# Patient Record
Sex: MALE | Race: WHITE | NOT HISPANIC OR LATINO | Employment: OTHER | URBAN - METROPOLITAN AREA
[De-identification: names, ages, dates, MRNs, and addresses within clinical notes are randomized per-mention and may not be internally consistent; named-entity substitution may affect disease eponyms.]

---

## 2017-01-09 ENCOUNTER — OFFICE VISIT (OUTPATIENT)
Dept: INTERNAL MEDICINE | Facility: CLINIC | Age: 72
End: 2017-01-09

## 2017-01-09 VITALS
BODY MASS INDEX: 27.44 KG/M2 | RESPIRATION RATE: 12 BRPM | WEIGHT: 196 LBS | SYSTOLIC BLOOD PRESSURE: 148 MMHG | HEART RATE: 48 BPM | TEMPERATURE: 97.1 F | HEIGHT: 71 IN | DIASTOLIC BLOOD PRESSURE: 80 MMHG

## 2017-01-09 DIAGNOSIS — N30.10 INTERSTITIAL CYSTITIS (CHRONIC) WITHOUT HEMATURIA: ICD-10-CM

## 2017-01-09 DIAGNOSIS — I25.10 CHRONIC CORONARY ARTERY DISEASE: Primary | ICD-10-CM

## 2017-01-09 DIAGNOSIS — E78.2 MIXED HYPERLIPIDEMIA: ICD-10-CM

## 2017-01-09 DIAGNOSIS — N40.0 PROSTATE ENLARGEMENT: ICD-10-CM

## 2017-01-09 DIAGNOSIS — I10 ESSENTIAL HYPERTENSION: ICD-10-CM

## 2017-01-09 PROCEDURE — 99214 OFFICE O/P EST MOD 30 MIN: CPT | Performed by: FAMILY MEDICINE

## 2017-01-09 NOTE — MR AVS SNAPSHOT
Timi MUNOZ Luciano   1/9/2017 9:45 AM   Office Visit    Dept Phone:  [phone]   Encounter #:  [id number]    Provider:  Stanley Ruzi Jr., MD   Department:  [facility] INTERNAL MEDICINE                Your Full Care Plan              Today's Medication Changes          These changes are accurate as of: 1/9/17 10:40 AM.  If you have any questions, ask your nurse or doctor.               Stop taking medication(s)listed here:     pentosan polysulfate 100 MG capsule   Commonly known as:  ELMIRON                      Your Updated Medication List          This list is accurate as of: 1/9/17 10:40 AM.  Always use your most recent med list.                amLODIPine-benazepril 5-20 MG per capsule   Commonly known as:  LOTREL 5-20   Take 1 capsule by mouth daily.       aspirin 81 MG tablet       Coenzyme Q10 150 MG capsule       hydrochlorothiazide 12.5 MG capsule   Commonly known as:  MICROZIDE   TAKE 1 CAPSULE DAILY       hydrocortisone 2.5 % rectal cream   Commonly known as:  ANUSOL-HC       Melatonin 3 MG capsule       metoprolol succinate XL 50 MG 24 hr tablet   Commonly known as:  TOPROL-XL   Take 1 tablet by mouth daily.       pravastatin 80 MG tablet   Commonly known as:  PRAVACHOL   Take 1 tablet by mouth every night.       tamsulosin 0.4 MG capsule 24 hr capsule   Commonly known as:  FLOMAX   Take 1 capsule by mouth daily.               You Were Diagnosed With        Codes Comments    Chronic coronary artery disease    -  Primary ICD-10-CM: I25.10  ICD-9-CM: 414.00     Essential hypertension     ICD-10-CM: I10  ICD-9-CM: 401.9     Mixed hyperlipidemia     ICD-10-CM: E78.2  ICD-9-CM: 272.2     Interstitial cystitis (chronic) without hematuria     ICD-10-CM: N30.10  ICD-9-CM: 595.1     Prostate enlargement     ICD-10-CM: N40.0  ICD-9-CM: 600.00       Instructions     None    Patient Instructions History      Upcoming Appointments     Visit Type Date Time Department    OFFICE  "VISIT 1/9/2017  9:45 AM [facility] 4003    OFFICE VISIT 7/10/2017 10:30 AM [facility] 4003      MyChart Signup     Our records indicate that you have declined [facility]. If you would like to sign up for [facility], please email [email or web address] or call [phone] to obtain an activation code.             Other Info from Your Visit           Your Appointments     Jul 10, 2017 10:30 AM EDT   Office Visit with Stanley Ruiz Jr., MD   [facility] INTERNAL MEDICINE (--)    [address]  [facility] [address] [phone]           Arrive 15 minutes prior to appointment.              Allergies     No Known Allergies      Reason for Visit     Coronary Artery Disease     Hypertension     Hyperlipidemia           Vital Signs     Blood Pressure Pulse Temperature Respirations Height Weight    148/80 (BP Location: Left arm, Patient Position: Sitting, Cuff Size: Adult) 48 97.1 °F (36.2 °C) (Tympanic) 12 71\" (180.3 cm) 196 lb (88.9 kg)    Body Mass Index Smoking Status                27.34 kg/m2 Former Smoker          Problems and Diagnoses Noted     Heart disease due to blocked artery    High cholesterol or triglycerides    High blood pressure    Interstitial cystitis    Enlarged prostate        "

## 2017-01-09 NOTE — PROGRESS NOTES
Subjective   Timi Wolf is a 71 y.o. male.     Chief Complaint   Patient presents with   • Coronary Artery Disease   • Hypertension   • Hyperlipidemia         History of Present Illness   Patient is a retired  previously followed by Dr. Williamson.  Problems followed and managed include history of coronary artery disease without history of procedure plus hypertension plus hyperlipidemia plus history of BPH and her social cystitis.  Medications and labs from last year reviewed.  He is relatively asymptomatic at this time.  Chronic maintenance health also reviewed.  He stays active but does have to help take care of his wife who has chronic pain in the back and legs.      The following portions of the patient's history were reviewed and updated as appropriate: allergies, current medications, past social history and problem list.    Review of Systems   Constitutional: Negative.    HENT: Negative.    Eyes: Negative.    Respiratory: Negative.    Cardiovascular: Negative.    Gastrointestinal: Negative.    Endocrine: Negative.    Genitourinary: Negative.    Musculoskeletal: Negative.    Skin: Negative.    Allergic/Immunologic: Negative.    Neurological: Negative.    Hematological: Negative.    Psychiatric/Behavioral: Negative.        Objective   Vitals:    01/09/17 0948   BP: 148/80   Pulse: (!) 48   Resp: 12   Temp: 97.1 °F (36.2 °C)     Physical Exam   Constitutional: He is oriented to person, place, and time. He appears well-developed and well-nourished.   HENT:   Head: Normocephalic and atraumatic.   Right Ear: Tympanic membrane and external ear normal.   Left Ear: Tympanic membrane and external ear normal.   Nose: Nose normal.   Mouth/Throat: Oropharynx is clear and moist.   Eyes: Conjunctivae and EOM are normal. Pupils are equal, round, and reactive to light.   Neck: Normal range of motion. Neck supple. No JVD present. No thyromegaly present.   Cardiovascular: Normal rate, regular rhythm, normal heart  sounds and intact distal pulses.    Pulmonary/Chest: Effort normal and breath sounds normal.   Abdominal: Soft. Bowel sounds are normal.   Musculoskeletal: Normal range of motion.   Lymphadenopathy:     He has no cervical adenopathy.   Neurological: He is alert and oriented to person, place, and time. No cranial nerve deficit. Coordination normal.   Skin: Skin is warm and dry. No rash noted.   Psychiatric: He has a normal mood and affect. His behavior is normal. Judgment and thought content normal.   Vitals reviewed.      Assessment/Plan   Problem List Items Addressed This Visit        Cardiovascular and Mediastinum    Chronic coronary artery disease - Primary    Relevant Orders    CBC & Differential    Comprehensive Metabolic Panel    Lipid Panel With / Chol / HDL Ratio    PSA, Total & Free    Hypertension    Relevant Orders    CBC & Differential    Comprehensive Metabolic Panel    Lipid Panel With / Chol / HDL Ratio    PSA, Total & Free       Genitourinary    Prostate enlargement    Relevant Orders    CBC & Differential    Comprehensive Metabolic Panel    Lipid Panel With / Chol / HDL Ratio    PSA, Total & Free       Other    Hyperlipidemia    Relevant Orders    CBC & Differential    Comprehensive Metabolic Panel    Lipid Panel With / Chol / HDL Ratio    PSA, Total & Free    Interstitial cystitis (chronic) without hematuria    Relevant Orders    CBC & Differential    Comprehensive Metabolic Panel    Lipid Panel With / Chol / HDL Ratio    PSA, Total & Free       plan: Meds unchanged.  Call for refills future labs to be done prior to next visit in 6 months.

## 2017-04-10 RX ORDER — AMLODIPINE BESYLATE AND BENAZEPRIL HYDROCHLORIDE 5; 20 MG/1; MG/1
1 CAPSULE ORAL DAILY
Qty: 10 CAPSULE | Refills: 0 | Status: SHIPPED | OUTPATIENT
Start: 2017-04-10 | End: 2017-04-10 | Stop reason: SDUPTHER

## 2017-04-10 RX ORDER — AMLODIPINE BESYLATE AND BENAZEPRIL HYDROCHLORIDE 5; 20 MG/1; MG/1
1 CAPSULE ORAL DAILY
Qty: 90 CAPSULE | Refills: 1 | Status: SHIPPED | OUTPATIENT
Start: 2017-04-10 | End: 2017-10-07 | Stop reason: SDUPTHER

## 2017-04-11 ENCOUNTER — OFFICE VISIT (OUTPATIENT)
Dept: INTERNAL MEDICINE | Facility: CLINIC | Age: 72
End: 2017-04-11

## 2017-04-11 VITALS
WEIGHT: 200 LBS | HEART RATE: 69 BPM | SYSTOLIC BLOOD PRESSURE: 98 MMHG | TEMPERATURE: 98.3 F | RESPIRATION RATE: 16 BRPM | BODY MASS INDEX: 28 KG/M2 | HEIGHT: 71 IN | OXYGEN SATURATION: 94 % | DIASTOLIC BLOOD PRESSURE: 70 MMHG

## 2017-04-11 DIAGNOSIS — H81.10 BPPV (BENIGN PAROXYSMAL POSITIONAL VERTIGO), UNSPECIFIED LATERALITY: Primary | ICD-10-CM

## 2017-04-11 PROCEDURE — 99213 OFFICE O/P EST LOW 20 MIN: CPT | Performed by: NURSE PRACTITIONER

## 2017-04-11 RX ORDER — MECLIZINE HCL 12.5 MG/1
12.5 TABLET ORAL 3 TIMES DAILY PRN
Qty: 60 TABLET | Refills: 0 | Status: SHIPPED | OUTPATIENT
Start: 2017-04-11 | End: 2019-01-08

## 2017-04-11 NOTE — PROGRESS NOTES
"Vitals:    04/11/17 1412   BP: 98/70   Pulse:    Resp:    Temp:    SpO2:      Last 2 weights    04/11/17  1339   Weight: 200 lb (90.7 kg)     Social History   Substance Use Topics   • Smoking status: Former Smoker   • Smokeless tobacco: Not on file   • Alcohol use Yes      Comment: occasional use       Subjective     HPI  Pt presents to office today with a dizziness \"whirling\" sensation that has been presents for the past 4-6 weeks. Pt states it started out with just happening with position changes in bed but has recently noticed it occurring during the day. For example, he was working under the sink to repair something and with the lateral tilt of his head to either side created the dizzy \"whirling\" feeling. Pt also has it when he bend over. He has not check his BP during the episode but did notice his bp is lower than it typically is. Pt also states something if the sensation lasts long/or is a bad episode he becomes nauseous. Pt denies syncope, vision changes, bearing down. Headaches, URI symptoms, cp, palpitations, soa, wheezing, chest tightness. According to pt he has been on the BP medication regimen for years and has never affected him or noticed negative SE's.     The following portions of the patient's history were reviewed and updated as appropriate: allergies, current medications, past medical history, past social history and problem list.    Review of Systems   Constitutional: Negative.    Respiratory: Negative.    Cardiovascular: Negative.    Neurological: Positive for dizziness.       Objective     Physical Exam   Constitutional: He is oriented to person, place, and time. He appears well-developed and well-nourished.   HENT:   Head: Normocephalic and atraumatic.   Neck: Normal range of motion.   Cardiovascular: Normal rate, regular rhythm and normal heart sounds.    Pulmonary/Chest: Effort normal and breath sounds normal.   Musculoskeletal: Normal range of motion.   Neurological: He is alert and " oriented to person, place, and time.   Nursing note and vitals reviewed.      Assessment/Plan   Timi was seen today for dizziness.    Diagnoses and all orders for this visit:    BPPV (benign paroxysmal positional vertigo), unspecified laterality  -     Cancel: Ambulatory Referral to Physical Therapy Vestibular  -     Ambulatory Referral to Physical Therapy Vestibular    Other orders  -     meclizine (ANTIVERT) 12.5 MG tablet; Take 1 tablet by mouth 3 (Three) Times a Day As Needed for dizziness.         -likely this is BPPV, will refer to PT and start antivert  -also would like to pt keep BP log given he states his BP is on the lower end of normal for him and to follow up in 2-4 weeks with myself or Dr. Ruiz. If he notices that there is a consistency with lows I advised him to take a 1/2 of his metoprolol and to christiano what day he did that.   -cont other home meds  -FU in 2-4 weeks or sooner if symptoms worsen.

## 2017-04-14 DIAGNOSIS — I15.9 SECONDARY HYPERTENSION, UNSPECIFIED: ICD-10-CM

## 2017-04-14 RX ORDER — TAMSULOSIN HYDROCHLORIDE 0.4 MG/1
1 CAPSULE ORAL DAILY
Qty: 90 CAPSULE | Refills: 1 | Status: SHIPPED | OUTPATIENT
Start: 2017-04-14 | End: 2017-10-07 | Stop reason: SDUPTHER

## 2017-04-14 RX ORDER — HYDROCHLOROTHIAZIDE 12.5 MG/1
12.5 CAPSULE, GELATIN COATED ORAL EVERY MORNING
Qty: 90 CAPSULE | Refills: 1 | Status: SHIPPED | OUTPATIENT
Start: 2017-04-14 | End: 2017-10-07 | Stop reason: SDUPTHER

## 2017-05-12 ENCOUNTER — OFFICE VISIT (OUTPATIENT)
Dept: INTERNAL MEDICINE | Facility: CLINIC | Age: 72
End: 2017-05-12

## 2017-05-12 VITALS
SYSTOLIC BLOOD PRESSURE: 108 MMHG | BODY MASS INDEX: 27.75 KG/M2 | RESPIRATION RATE: 16 BRPM | HEART RATE: 60 BPM | DIASTOLIC BLOOD PRESSURE: 78 MMHG | TEMPERATURE: 96.9 F | WEIGHT: 199 LBS

## 2017-05-12 DIAGNOSIS — I10 ESSENTIAL HYPERTENSION: Primary | ICD-10-CM

## 2017-05-12 DIAGNOSIS — R42 VERTIGO: ICD-10-CM

## 2017-05-12 PROCEDURE — 99213 OFFICE O/P EST LOW 20 MIN: CPT | Performed by: FAMILY MEDICINE

## 2017-06-23 RX ORDER — PRAVASTATIN SODIUM 80 MG/1
80 TABLET ORAL NIGHTLY
Qty: 90 TABLET | Refills: 3 | Status: SHIPPED | OUTPATIENT
Start: 2017-06-23 | End: 2018-07-10 | Stop reason: SDUPTHER

## 2017-07-05 ENCOUNTER — RESULTS ENCOUNTER (OUTPATIENT)
Dept: INTERNAL MEDICINE | Facility: CLINIC | Age: 72
End: 2017-07-05

## 2017-07-05 DIAGNOSIS — N30.10 INTERSTITIAL CYSTITIS (CHRONIC) WITHOUT HEMATURIA: ICD-10-CM

## 2017-07-05 DIAGNOSIS — E78.2 MIXED HYPERLIPIDEMIA: ICD-10-CM

## 2017-07-05 DIAGNOSIS — I10 ESSENTIAL HYPERTENSION: ICD-10-CM

## 2017-07-05 DIAGNOSIS — I25.10 CHRONIC CORONARY ARTERY DISEASE: ICD-10-CM

## 2017-07-05 DIAGNOSIS — N40.0 PROSTATE ENLARGEMENT: ICD-10-CM

## 2017-07-12 RX ORDER — METOPROLOL SUCCINATE 50 MG/1
50 TABLET, EXTENDED RELEASE ORAL DAILY
Qty: 90 TABLET | Refills: 3 | Status: SHIPPED | OUTPATIENT
Start: 2017-07-12 | End: 2018-07-10 | Stop reason: SDUPTHER

## 2017-07-21 LAB
ALBUMIN SERPL-MCNC: 4.3 G/DL (ref 3.5–5.2)
ALBUMIN/GLOB SERPL: 1.9 G/DL
ALP SERPL-CCNC: 64 U/L (ref 39–117)
ALT SERPL-CCNC: 16 U/L (ref 1–41)
AST SERPL-CCNC: 14 U/L (ref 1–40)
BASOPHILS # BLD AUTO: 0.04 10*3/MM3 (ref 0–0.2)
BASOPHILS NFR BLD AUTO: 0.6 % (ref 0–1.5)
BILIRUB SERPL-MCNC: 0.7 MG/DL (ref 0.1–1.2)
BUN SERPL-MCNC: 20 MG/DL (ref 8–23)
BUN/CREAT SERPL: 15.2 (ref 7–25)
CALCIUM SERPL-MCNC: 9.8 MG/DL (ref 8.6–10.5)
CHLORIDE SERPL-SCNC: 100 MMOL/L (ref 98–107)
CHOLEST SERPL-MCNC: 133 MG/DL (ref 0–200)
CHOLEST/HDLC SERPL: 3.41 {RATIO}
CO2 SERPL-SCNC: 22.4 MMOL/L (ref 22–29)
CREAT SERPL-MCNC: 1.32 MG/DL (ref 0.76–1.27)
EOSINOPHIL # BLD AUTO: 0.25 10*3/MM3 (ref 0–0.7)
EOSINOPHIL NFR BLD AUTO: 3.5 % (ref 0.3–6.2)
ERYTHROCYTE [DISTWIDTH] IN BLOOD BY AUTOMATED COUNT: 12.4 % (ref 11.5–14.5)
GLOBULIN SER CALC-MCNC: 2.3 GM/DL
GLUCOSE SERPL-MCNC: 99 MG/DL (ref 65–99)
HCT VFR BLD AUTO: 47.2 % (ref 40.4–52.2)
HDLC SERPL-MCNC: 39 MG/DL (ref 40–60)
HGB BLD-MCNC: 15.9 G/DL (ref 13.7–17.6)
IMM GRANULOCYTES # BLD: 0.03 10*3/MM3 (ref 0–0.03)
IMM GRANULOCYTES NFR BLD: 0.4 % (ref 0–0.5)
LDLC SERPL CALC-MCNC: 79 MG/DL (ref 0–100)
LYMPHOCYTES # BLD AUTO: 1.65 10*3/MM3 (ref 0.9–4.8)
LYMPHOCYTES NFR BLD AUTO: 23.1 % (ref 19.6–45.3)
MCH RBC QN AUTO: 30.9 PG (ref 27–32.7)
MCHC RBC AUTO-ENTMCNC: 33.7 G/DL (ref 32.6–36.4)
MCV RBC AUTO: 91.7 FL (ref 79.8–96.2)
MONOCYTES # BLD AUTO: 0.83 10*3/MM3 (ref 0.2–1.2)
MONOCYTES NFR BLD AUTO: 11.6 % (ref 5–12)
NEUTROPHILS # BLD AUTO: 4.34 10*3/MM3 (ref 1.9–8.1)
NEUTROPHILS NFR BLD AUTO: 60.8 % (ref 42.7–76)
PLATELET # BLD AUTO: 157 10*3/MM3 (ref 140–500)
POTASSIUM SERPL-SCNC: 4.4 MMOL/L (ref 3.5–5.2)
PROT SERPL-MCNC: 6.6 G/DL (ref 6–8.5)
PSA FREE MFR SERPL: 18.3 %
PSA FREE SERPL-MCNC: 0.33 NG/ML
PSA SERPL-MCNC: 1.8 NG/ML (ref 0–4)
RBC # BLD AUTO: 5.15 10*6/MM3 (ref 4.6–6)
SODIUM SERPL-SCNC: 139 MMOL/L (ref 136–145)
TRIGL SERPL-MCNC: 73 MG/DL (ref 0–150)
VLDLC SERPL CALC-MCNC: 14.6 MG/DL (ref 5–40)
WBC # BLD AUTO: 7.14 10*3/MM3 (ref 4.5–10.7)

## 2017-07-26 ENCOUNTER — OFFICE VISIT (OUTPATIENT)
Dept: INTERNAL MEDICINE | Facility: CLINIC | Age: 72
End: 2017-07-26

## 2017-07-26 VITALS
BODY MASS INDEX: 27.34 KG/M2 | DIASTOLIC BLOOD PRESSURE: 72 MMHG | WEIGHT: 196 LBS | SYSTOLIC BLOOD PRESSURE: 118 MMHG | OXYGEN SATURATION: 98 % | HEART RATE: 57 BPM | TEMPERATURE: 97.6 F

## 2017-07-26 DIAGNOSIS — Z23 NEED FOR PNEUMOCOCCAL VACCINE: ICD-10-CM

## 2017-07-26 DIAGNOSIS — I10 ESSENTIAL HYPERTENSION: ICD-10-CM

## 2017-07-26 DIAGNOSIS — N40.0 PROSTATE ENLARGEMENT: ICD-10-CM

## 2017-07-26 DIAGNOSIS — I25.10 CHRONIC CORONARY ARTERY DISEASE: Primary | ICD-10-CM

## 2017-07-26 DIAGNOSIS — E78.2 MIXED HYPERLIPIDEMIA: ICD-10-CM

## 2017-07-26 PROCEDURE — G0439 PPPS, SUBSEQ VISIT: HCPCS | Performed by: FAMILY MEDICINE

## 2017-07-26 PROCEDURE — 99214 OFFICE O/P EST MOD 30 MIN: CPT | Performed by: FAMILY MEDICINE

## 2017-07-26 PROCEDURE — 90670 PCV13 VACCINE IM: CPT | Performed by: FAMILY MEDICINE

## 2017-07-26 PROCEDURE — G0009 ADMIN PNEUMOCOCCAL VACCINE: HCPCS | Performed by: FAMILY MEDICINE

## 2017-07-26 NOTE — PROGRESS NOTES
QUICK REFERENCE INFORMATION:  The ABCs of the Annual Wellness Visit    Subsequent Medicare Wellness Visit    HEALTH RISK ASSESSMENT    1945    Recent Hospitalizations:  No hospitalization(s) within the last year..        Current Medical Providers:  Patient Care Team:  Stanley Ruiz Jr., MD as PCP - General (Family Medicine)        Smoking Status:  History   Smoking Status   • Former Smoker   Smokeless Tobacco   • Not on file       Alcohol Consumption:  History   Alcohol Use   • Yes     Comment: occasional use       Depression Screen:   PHQ-9 Depression Screening 7/26/2017   Little interest or pleasure in doing things 0   Feeling down, depressed, or hopeless 0   Trouble falling or staying asleep, or sleeping too much 0   Feeling tired or having little energy 0   Poor appetite or overeating 0   Feeling bad about yourself - or that you are a failure or have let yourself or your family down 0   Trouble concentrating on things, such as reading the newspaper or watching television 0   Moving or speaking so slowly that other people could have noticed. Or the opposite - being so fidgety or restless that you have been moving around a lot more than usual 0   Thoughts that you would be better off dead, or of hurting yourself in some way 0   PHQ-9 Total Score 0   If you checked off any problems, how difficult have these problems made it for you to do your work, take care of things at home, or get along with other people? Not difficult at all       Health Habits and Functional and Cognitive Screening:  Functional & Cognitive Status 7/26/2017   Do you have difficulty preparing food and eating? No   Do you have difficulty bathing yourself? No   Do you have difficulty getting dressed? No   Do you have difficulty using the toilet? No   Do you have difficulty moving around from place to place? No   In the past year have you fallen or experienced a near fall? No   Do you need help using the phone?  No   Are you deaf or do you have  serious difficulty hearing?  No   Do you need help with transportation? No   Do you need help shopping? No   Do you need help preparing meals?  No   Do you need help with housework?  No   Do you need help with laundry? No   Do you need help taking your medications? No   Do you need help managing money? No       Exercise: Walking 4 times per week      Does the patient have evidence of cognitive impairment? No    Aspirin use counseling: Taking ASA appropriately as indicated      Recent Lab Results:  CMP:  Lab Results   Component Value Date    GLU 99 07/19/2017    BUN 20 07/19/2017    CREATININE 1.32 (H) 07/19/2017    EGFRIFNONA 54 (L) 07/19/2017    EGFRIFAFRI 65 07/19/2017    BCR 15.2 07/19/2017     07/19/2017    K 4.4 07/19/2017    CO2 22.4 07/19/2017    CALCIUM 9.8 07/19/2017    PROTENTOTREF 6.6 07/19/2017    ALBUMIN 4.30 07/19/2017    LABGLOBREF 2.3 07/19/2017    LABIL2 1.9 07/19/2017    BILITOT 0.7 07/19/2017    ALKPHOS 64 07/19/2017    AST 14 07/19/2017    ALT 16 07/19/2017     Lipid Panel:  Lab Results   Component Value Date    TRIG 73 07/19/2017    HDL 39 (L) 07/19/2017    VLDL 14.6 07/19/2017     HbA1c:       Visual Acuity:  No exam data present    Age-appropriate Screening Schedule:  Refer to the list below for future screening recommendations based on patient's age, sex and/or medical conditions. Orders for these recommended tests are listed in the plan section. The patient has been provided with a written plan.    Health Maintenance   Topic Date Due   • PNEUMOCOCCAL VACCINES (65+ LOW/MEDIUM RISK) (2 of 2 - PCV13) 06/27/2017   • INFLUENZA VACCINE  08/01/2017   • LIPID PANEL  07/19/2018   • TDAP/TD VACCINES (3 - Td) 05/18/2024   • COLONOSCOPY  08/28/2025   • ZOSTER VACCINE  Completed        Subjective   History of Present Illness    Timi Wolf is a 71 y.o. male who presents for an Subsequent Wellness Visit.    The following portions of the patient's history were reviewed and updated as appropriate:  allergies, current medications, past family history, past medical history, past social history, past surgical history and problem list.    Outpatient Medications Prior to Visit   Medication Sig Dispense Refill   • amLODIPine-benazepril (LOTREL 5-20) 5-20 MG per capsule Take 1 capsule by mouth Daily. 90 capsule 1   • aspirin 81 MG tablet Take 81 mg by mouth daily.     • Coenzyme Q10 150 MG capsule Take 1 tablet by mouth daily.     • hydrochlorothiazide (MICROZIDE) 12.5 MG capsule Take 1 capsule by mouth Every Morning. 90 capsule 1   • hydrocortisone (ANUSOL-HC) 2.5 % rectal cream Insert  into the rectum 4 (Four) Times a Day. 28.35 g 3   • Melatonin 3 MG capsule Take  by mouth Every Night.     • metoprolol succinate XL (TOPROL-XL) 50 MG 24 hr tablet Take 1 tablet by mouth Daily. 90 tablet 3   • pravastatin (PRAVACHOL) 80 MG tablet Take 1 tablet by mouth Every Night. 90 tablet 3   • tamsulosin (FLOMAX) 0.4 MG capsule 24 hr capsule Take 1 capsule by mouth Daily. 90 capsule 1   • meclizine (ANTIVERT) 12.5 MG tablet Take 1 tablet by mouth 3 (Three) Times a Day As Needed for dizziness. 60 tablet 0     No facility-administered medications prior to visit.        Patient Active Problem List   Diagnosis   • Chronic coronary artery disease   • Hyperlipidemia   • Hypertension   • Interstitial cystitis (chronic) without hematuria   • Prostate enlargement       Advance Care Planning:  has an advance directive - a copy has been provided and is in file    Identification of Risk Factors:  Risk factors include: cardiovascular risk.    Review of Systems    Compared to one year ago, the patient feels his physical health is the same.  Compared to one year ago, the patient feels his mental health is the same.    Objective     Physical Exam    Vitals:    07/26/17 0935   BP: 118/72   BP Location: Left arm   Patient Position: Sitting   Cuff Size: Adult   Pulse: 57   Temp: 97.6 °F (36.4 °C)   TempSrc: Tympanic   SpO2: 98%   Weight: 196 lb  (88.9 kg)   PainSc:   2       Body mass index is 27.34 kg/(m^2).  Discussed the patient's BMI with him. The BMI is in the acceptable range.    Assessment/Plan   Patient Self-Management and Personalized Health Advice  The patient has been provided with information about: prevention of cardiac or vascular disease and preventive services including:   · Counseling for cardiovascular disease risk reduction.    Visit Diagnoses:  No diagnosis found.    No orders of the defined types were placed in this encounter.      Outpatient Encounter Prescriptions as of 7/26/2017   Medication Sig Dispense Refill   • amLODIPine-benazepril (LOTREL 5-20) 5-20 MG per capsule Take 1 capsule by mouth Daily. 90 capsule 1   • aspirin 81 MG tablet Take 81 mg by mouth daily.     • Coenzyme Q10 150 MG capsule Take 1 tablet by mouth daily.     • hydrochlorothiazide (MICROZIDE) 12.5 MG capsule Take 1 capsule by mouth Every Morning. 90 capsule 1   • hydrocortisone (ANUSOL-HC) 2.5 % rectal cream Insert  into the rectum 4 (Four) Times a Day. 28.35 g 3   • Melatonin 3 MG capsule Take  by mouth Every Night.     • metoprolol succinate XL (TOPROL-XL) 50 MG 24 hr tablet Take 1 tablet by mouth Daily. 90 tablet 3   • pravastatin (PRAVACHOL) 80 MG tablet Take 1 tablet by mouth Every Night. 90 tablet 3   • tamsulosin (FLOMAX) 0.4 MG capsule 24 hr capsule Take 1 capsule by mouth Daily. 90 capsule 1   • meclizine (ANTIVERT) 12.5 MG tablet Take 1 tablet by mouth 3 (Three) Times a Day As Needed for dizziness. 60 tablet 0     No facility-administered encounter medications on file as of 7/26/2017.        Reviewed use of high risk medication in the elderly: not applicable  Reviewed for potential of harmful drug interactions in the elderly: not applicable    Follow Up:  No Follow-up on file.     An After Visit Summary and PPPS with all of these plans were given to the patient.

## 2017-07-26 NOTE — PROGRESS NOTES
Subjective   Timi Wolf is a 71 y.o. male.     Chief Complaint   Patient presents with   • Annual Exam   • Hypertension   • Hyperlipidemia         History of Present Illness   Patient is here for Medicare wellness visit.  History of CAD hypertension hyperlipidemia is reviewed.  He is seeing cardiology in the past.  Apart no intervention for moderate CAD.  Otherwise he is seen Dr. Boni Srinivasan in the past with evidence of some degree of interstitial cystitis.  He was treated with Elmaron for a while and then this was discontinued as well.  Is been able to manage the symptoms mostly with diet.  He is otherwise fairly asymptomatic.      The following portions of the patient's history were reviewed and updated as appropriate: allergies, current medications, past social history and problem list.    Review of Systems   Constitutional: Negative.    HENT: Negative.    Eyes: Negative.    Respiratory: Negative.    Cardiovascular: Negative.    Gastrointestinal: Negative.    Endocrine: Negative.    Genitourinary: Negative.    Musculoskeletal: Negative.    Skin: Negative.    Allergic/Immunologic: Negative.    Neurological: Negative.    Hematological: Negative.    Psychiatric/Behavioral: Negative.        Objective   Vitals:    07/26/17 0935   BP: 118/72   Pulse: 57   Temp: 97.6 °F (36.4 °C)   SpO2: 98%     Physical Exam   Constitutional: He is oriented to person, place, and time. He appears well-developed and well-nourished.   HENT:   Head: Normocephalic and atraumatic.   Right Ear: Tympanic membrane and external ear normal.   Left Ear: Tympanic membrane and external ear normal.   Nose: Nose normal.   Mouth/Throat: Oropharynx is clear and moist.   Eyes: Conjunctivae and EOM are normal. Pupils are equal, round, and reactive to light.   Neck: Normal range of motion. Neck supple. No JVD present. No thyromegaly present.   Cardiovascular: Normal rate, regular rhythm, normal heart sounds and intact distal pulses.     Pulmonary/Chest: Effort normal and breath sounds normal.   Abdominal: Soft. Bowel sounds are normal.   Genitourinary: Rectum normal. Rectal exam shows no mass, no tenderness and guaiac negative stool. Prostate is enlarged. Prostate is not tender.   Musculoskeletal: Normal range of motion.   Lymphadenopathy:     He has no cervical adenopathy.   Neurological: He is alert and oriented to person, place, and time. No cranial nerve deficit. Coordination normal.   Skin: Skin is warm and dry. No rash noted.   Psychiatric: He has a normal mood and affect. His behavior is normal. Judgment and thought content normal.   Vitals reviewed.      Assessment/Plan   Problem List Items Addressed This Visit        Cardiovascular and Mediastinum    Chronic coronary artery disease - Primary    Relevant Orders    Urinalysis With / Microscopic If Indicated    PSA, Total & Free    CBC & Differential    Comprehensive Metabolic Panel    Lipid Panel With / Chol / HDL Ratio    Hyperlipidemia    Relevant Orders    Urinalysis With / Microscopic If Indicated    PSA, Total & Free    CBC & Differential    Comprehensive Metabolic Panel    Lipid Panel With / Chol / HDL Ratio    Hypertension    Relevant Orders    Urinalysis With / Microscopic If Indicated    PSA, Total & Free    CBC & Differential    Comprehensive Metabolic Panel    Lipid Panel With / Chol / HDL Ratio       Genitourinary    Prostate enlargement    Relevant Orders    Urinalysis With / Microscopic If Indicated    PSA, Total & Free    CBC & Differential    Comprehensive Metabolic Panel    Lipid Panel With / Chol / HDL Ratio      Other Visit Diagnoses     Need for pneumococcal vaccine        Relevant Orders    Pneumococcal Conjugate Vaccine 13-Valent All (PCV13) (Completed)    Urinalysis With / Microscopic If Indicated    PSA, Total & Free    CBC & Differential    Comprehensive Metabolic Panel    Lipid Panel With / Chol / HDL Ratio      Continue current meds.  Recheck in 1 year with labs  preceding visit for annual exam.  Call for any problems otherwise.  If he desires to urology so he was call.  Is given Prevnar 13 today.

## 2017-07-26 NOTE — PATIENT INSTRUCTIONS
Medicare Wellness  Personal Prevention Plan of Service     Date of Office Visit:  2017  Encounter Provider:  Stanley Ruiz Jr., MD  Place of Service:  [facility] INTERNAL MEDICINE  Patient Name: Timi Wolf  :  1945    As part of the Medicare Wellness portion of your visit today, we are providing you with this personalized preventive plan of services (PPPS). This plan is based upon recommendations of the [place] Preventive Services Task Force (USPSTF) and the Advisory Committee on Immunization Practices (ACIP).    This lists the preventive care services that should be considered, and provides dates of when you are due. Items listed as completed are up-to-date and do not require any further intervention.    Health Maintenance   Topic Date Due   • HEPATITIS C SCREENING  2016   • MEDICARE ANNUAL WELLNESS  2016   • AAA SCREEN (ONE-TIME)  2016   • PNEUMOCOCCAL VACCINES (65+ LOW/MEDIUM RISK) (2 of 2 - PCV13) 2017   • INFLUENZA VACCINE  2017   • LIPID PANEL  2018   • TDAP/TD VACCINES (3 - Td) 2024   • COLONOSCOPY  2025   • ZOSTER VACCINE  Completed       No orders of the defined types were placed in this encounter.      No Follow-up on file.

## 2017-10-07 DIAGNOSIS — I15.9 SECONDARY HYPERTENSION: ICD-10-CM

## 2017-10-09 RX ORDER — HYDROCHLOROTHIAZIDE 12.5 MG/1
CAPSULE, GELATIN COATED ORAL
Qty: 90 CAPSULE | Refills: 1 | Status: SHIPPED | OUTPATIENT
Start: 2017-10-09 | End: 2018-04-05 | Stop reason: SDUPTHER

## 2017-10-09 RX ORDER — AMLODIPINE BESYLATE AND BENAZEPRIL HYDROCHLORIDE 5; 20 MG/1; MG/1
CAPSULE ORAL
Qty: 90 CAPSULE | Refills: 1 | Status: SHIPPED | OUTPATIENT
Start: 2017-10-09 | End: 2018-04-05 | Stop reason: SDUPTHER

## 2017-10-09 RX ORDER — TAMSULOSIN HYDROCHLORIDE 0.4 MG/1
CAPSULE ORAL
Qty: 90 CAPSULE | Refills: 1 | Status: SHIPPED | OUTPATIENT
Start: 2017-10-09 | End: 2018-05-03 | Stop reason: SDUPTHER

## 2018-04-05 DIAGNOSIS — I15.9 SECONDARY HYPERTENSION: ICD-10-CM

## 2018-04-05 RX ORDER — HYDROCHLOROTHIAZIDE 12.5 MG/1
CAPSULE, GELATIN COATED ORAL
Qty: 90 CAPSULE | Refills: 1 | Status: SHIPPED | OUTPATIENT
Start: 2018-04-05 | End: 2018-09-26 | Stop reason: SDUPTHER

## 2018-04-05 RX ORDER — AMLODIPINE BESYLATE AND BENAZEPRIL HYDROCHLORIDE 5; 20 MG/1; MG/1
CAPSULE ORAL
Qty: 90 CAPSULE | Refills: 1 | Status: SHIPPED | OUTPATIENT
Start: 2018-04-05 | End: 2018-09-26 | Stop reason: SDUPTHER

## 2018-05-03 RX ORDER — TAMSULOSIN HYDROCHLORIDE 0.4 MG/1
CAPSULE ORAL
Qty: 90 CAPSULE | Refills: 1 | Status: SHIPPED | OUTPATIENT
Start: 2018-05-03 | End: 2018-08-17 | Stop reason: SDUPTHER

## 2018-07-05 ENCOUNTER — RESULTS ENCOUNTER (OUTPATIENT)
Dept: INTERNAL MEDICINE | Facility: CLINIC | Age: 73
End: 2018-07-05

## 2018-07-05 DIAGNOSIS — I25.10 CHRONIC CORONARY ARTERY DISEASE: ICD-10-CM

## 2018-07-05 DIAGNOSIS — E78.2 MIXED HYPERLIPIDEMIA: ICD-10-CM

## 2018-07-05 DIAGNOSIS — Z23 NEED FOR PNEUMOCOCCAL VACCINE: ICD-10-CM

## 2018-07-05 DIAGNOSIS — N40.0 PROSTATE ENLARGEMENT: ICD-10-CM

## 2018-07-05 DIAGNOSIS — I10 ESSENTIAL HYPERTENSION: ICD-10-CM

## 2018-07-10 RX ORDER — PRAVASTATIN SODIUM 80 MG/1
TABLET ORAL
Qty: 90 TABLET | Refills: 0 | Status: SHIPPED | OUTPATIENT
Start: 2018-07-10 | End: 2018-09-26 | Stop reason: SDUPTHER

## 2018-07-10 RX ORDER — METOPROLOL SUCCINATE 50 MG/1
TABLET, EXTENDED RELEASE ORAL
Qty: 90 TABLET | Refills: 0 | Status: SHIPPED | OUTPATIENT
Start: 2018-07-10 | End: 2018-09-26 | Stop reason: SDUPTHER

## 2018-08-17 ENCOUNTER — OFFICE VISIT (OUTPATIENT)
Dept: INTERNAL MEDICINE | Facility: CLINIC | Age: 73
End: 2018-08-17

## 2018-08-17 VITALS
OXYGEN SATURATION: 98 % | DIASTOLIC BLOOD PRESSURE: 70 MMHG | HEART RATE: 61 BPM | BODY MASS INDEX: 27.2 KG/M2 | SYSTOLIC BLOOD PRESSURE: 122 MMHG | WEIGHT: 195 LBS | TEMPERATURE: 97.6 F

## 2018-08-17 DIAGNOSIS — I10 ESSENTIAL HYPERTENSION: ICD-10-CM

## 2018-08-17 DIAGNOSIS — E78.2 MIXED HYPERLIPIDEMIA: ICD-10-CM

## 2018-08-17 DIAGNOSIS — B35.6 TINEA CRURIS: Primary | ICD-10-CM

## 2018-08-17 DIAGNOSIS — N40.0 PROSTATE ENLARGEMENT: ICD-10-CM

## 2018-08-17 PROCEDURE — 99214 OFFICE O/P EST MOD 30 MIN: CPT | Performed by: FAMILY MEDICINE

## 2018-08-17 RX ORDER — KETOCONAZOLE 20 MG/G
CREAM TOPICAL EVERY 12 HOURS SCHEDULED
Qty: 60 G | Refills: 1 | Status: SHIPPED | OUTPATIENT
Start: 2018-08-17 | End: 2018-08-17 | Stop reason: SDUPTHER

## 2018-08-17 RX ORDER — TAMSULOSIN HYDROCHLORIDE 0.4 MG/1
1 CAPSULE ORAL DAILY
Qty: 90 CAPSULE | Refills: 1 | Status: SHIPPED | OUTPATIENT
Start: 2018-08-17 | End: 2019-05-17 | Stop reason: SDUPTHER

## 2018-08-17 RX ORDER — KETOCONAZOLE 20 MG/G
CREAM TOPICAL EVERY 12 HOURS SCHEDULED
Qty: 60 G | Refills: 1 | Status: SHIPPED | OUTPATIENT
Start: 2018-08-17 | End: 2019-01-08

## 2018-08-17 NOTE — PROGRESS NOTES
Subjective   Timi Wolf is a 73 y.o. male.     Chief Complaint   Patient presents with   • Hyperlipidemia   • Rash   • Hypertension         History of Present Illness   Delightful gentleman here for follow-up of hypertension hyperlipidemia.  He is developed a tinea cruris-type rash in the right groin for the past one or 2 months.  He is now wearing boxer shorts.      The following portions of the patient's history were reviewed and updated as appropriate: allergies, current medications, past social history and problem list.    Review of Systems   Constitutional: Negative.    HENT: Negative.    Eyes: Negative.    Cardiovascular: Negative.    Gastrointestinal: Negative.    Endocrine: Negative.    Musculoskeletal: Negative.    Skin: Positive for rash.   All other systems reviewed and are negative.      Objective   Vitals:    08/17/18 1149   BP: 122/70   Pulse: 61   Temp: 97.6 °F (36.4 °C)   SpO2: 98%     Physical Exam   Constitutional: He is oriented to person, place, and time. He appears well-developed and well-nourished.   HENT:   Head: Normocephalic.   Right Ear: Tympanic membrane and external ear normal.   Left Ear: Tympanic membrane and external ear normal.   Mouth/Throat: Oropharynx is clear and moist.   Eyes: Pupils are equal, round, and reactive to light.   Neck: Normal range of motion. Neck supple.   Cardiovascular: Normal rate, regular rhythm and normal heart sounds.    Pulmonary/Chest: Effort normal and breath sounds normal.   Abdominal: Soft. Bowel sounds are normal.   Genitourinary:         Musculoskeletal: Normal range of motion.   Neurological: He is alert and oriented to person, place, and time.   Skin: Rash noted.   Psychiatric: He has a normal mood and affect.   Vitals reviewed.      Assessment/Plan   Problem List Items Addressed This Visit        Cardiovascular and Mediastinum    Hyperlipidemia    Relevant Orders    CBC & Differential    Comprehensive Metabolic Panel    Lipid Panel With / Chol /  HDL Ratio    PSA, Total & Free    Urinalysis With Microscopic If Indicated (No Culture) - Urine, Clean Catch    CBC & Differential    Comprehensive Metabolic Panel    Lipid Panel With / Chol / HDL Ratio    PSA, Total & Free    Urinalysis With Microscopic If Indicated (No Culture) - Urine, Clean Catch    Hypertension    Relevant Orders    CBC & Differential    Comprehensive Metabolic Panel    Lipid Panel With / Chol / HDL Ratio    PSA, Total & Free    Urinalysis With Microscopic If Indicated (No Culture) - Urine, Clean Catch    CBC & Differential    Comprehensive Metabolic Panel    Lipid Panel With / Chol / HDL Ratio    PSA, Total & Free    Urinalysis With Microscopic If Indicated (No Culture) - Urine, Clean Catch       Genitourinary    Prostate enlargement    Relevant Orders    CBC & Differential    Comprehensive Metabolic Panel    Lipid Panel With / Chol / HDL Ratio    PSA, Total & Free    Urinalysis With Microscopic If Indicated (No Culture) - Urine, Clean Catch    CBC & Differential    Comprehensive Metabolic Panel    Lipid Panel With / Chol / HDL Ratio    PSA, Total & Free    Urinalysis With Microscopic If Indicated (No Culture) - Urine, Clean Catch      Other Visit Diagnoses     Tinea cruris    -  Primary    Relevant Medications    ketoconazole (NIZORAL) 2 % cream    Other Relevant Orders    CBC & Differential    Comprehensive Metabolic Panel    Lipid Panel With / Chol / HDL Ratio    PSA, Total & Free    Urinalysis With Microscopic If Indicated (No Culture) - Urine, Clean Catch      Plan: Screening labs ketoconazole cream twice a day may try Lamisil dry powder spray over-the-counter recheck in year with labs

## 2018-08-20 LAB
ALBUMIN SERPL-MCNC: 4.2 G/DL (ref 3.5–5.2)
ALBUMIN/GLOB SERPL: 1.8 G/DL
ALP SERPL-CCNC: 61 U/L (ref 39–117)
ALT SERPL-CCNC: 30 U/L (ref 1–41)
APPEARANCE UR: CLEAR
AST SERPL-CCNC: 20 U/L (ref 1–40)
BASOPHILS # BLD AUTO: 0.03 10*3/MM3 (ref 0–0.2)
BASOPHILS NFR BLD AUTO: 0.3 % (ref 0–1.5)
BILIRUB SERPL-MCNC: 0.5 MG/DL (ref 0.1–1.2)
BILIRUB UR QL STRIP: NEGATIVE
BUN SERPL-MCNC: 22 MG/DL (ref 8–23)
BUN/CREAT SERPL: 17.3 (ref 7–25)
CALCIUM SERPL-MCNC: 10.4 MG/DL (ref 8.6–10.5)
CHLORIDE SERPL-SCNC: 104 MMOL/L (ref 98–107)
CHOLEST SERPL-MCNC: 126 MG/DL (ref 0–200)
CHOLEST/HDLC SERPL: 2.63 {RATIO}
CO2 SERPL-SCNC: 25.5 MMOL/L (ref 22–29)
COLOR UR: YELLOW
CREAT SERPL-MCNC: 1.27 MG/DL (ref 0.76–1.27)
EOSINOPHIL # BLD AUTO: 0.13 10*3/MM3 (ref 0–0.7)
EOSINOPHIL NFR BLD AUTO: 1.5 % (ref 0.3–6.2)
ERYTHROCYTE [DISTWIDTH] IN BLOOD BY AUTOMATED COUNT: 13 % (ref 11.5–14.5)
GLOBULIN SER CALC-MCNC: 2.3 GM/DL
GLUCOSE SERPL-MCNC: 82 MG/DL (ref 65–99)
GLUCOSE UR QL: NEGATIVE
HCT VFR BLD AUTO: 50.8 % (ref 40.4–52.2)
HDLC SERPL-MCNC: 48 MG/DL (ref 40–60)
HGB BLD-MCNC: 16.6 G/DL (ref 13.7–17.6)
HGB UR QL STRIP: NEGATIVE
IMM GRANULOCYTES # BLD: 0.08 10*3/MM3 (ref 0–0.03)
IMM GRANULOCYTES NFR BLD: 0.9 % (ref 0–0.5)
KETONES UR QL STRIP: NEGATIVE
LDLC SERPL CALC-MCNC: 61 MG/DL (ref 0–100)
LEUKOCYTE ESTERASE UR QL STRIP: NEGATIVE
LYMPHOCYTES # BLD AUTO: 2.16 10*3/MM3 (ref 0.9–4.8)
LYMPHOCYTES NFR BLD AUTO: 25.1 % (ref 19.6–45.3)
MCH RBC QN AUTO: 30.8 PG (ref 27–32.7)
MCHC RBC AUTO-ENTMCNC: 32.7 G/DL (ref 32.6–36.4)
MCV RBC AUTO: 94.2 FL (ref 79.8–96.2)
MONOCYTES # BLD AUTO: 0.67 10*3/MM3 (ref 0.2–1.2)
MONOCYTES NFR BLD AUTO: 7.8 % (ref 5–12)
NEUTROPHILS # BLD AUTO: 5.52 10*3/MM3 (ref 1.9–8.1)
NEUTROPHILS NFR BLD AUTO: 64.4 % (ref 42.7–76)
NITRITE UR QL STRIP: NEGATIVE
PH UR STRIP: 5.5 [PH] (ref 5–8)
PLATELET # BLD AUTO: 171 10*3/MM3 (ref 140–500)
POTASSIUM SERPL-SCNC: 4.7 MMOL/L (ref 3.5–5.2)
PROT SERPL-MCNC: 6.5 G/DL (ref 6–8.5)
PROT UR QL STRIP: NEGATIVE
PSA FREE MFR SERPL: 18.1 %
PSA FREE SERPL-MCNC: 0.29 NG/ML
PSA SERPL-MCNC: 1.6 NG/ML (ref 0–4)
RBC # BLD AUTO: 5.39 10*6/MM3 (ref 4.6–6)
SODIUM SERPL-SCNC: 140 MMOL/L (ref 136–145)
SP GR UR: 1.02 (ref 1–1.03)
TRIGL SERPL-MCNC: 87 MG/DL (ref 0–150)
UROBILINOGEN UR STRIP-MCNC: NORMAL MG/DL
VLDLC SERPL CALC-MCNC: 17.4 MG/DL (ref 5–40)
WBC # BLD AUTO: 8.59 10*3/MM3 (ref 4.5–10.7)

## 2018-09-26 DIAGNOSIS — I15.9 SECONDARY HYPERTENSION: ICD-10-CM

## 2018-09-27 RX ORDER — METOPROLOL SUCCINATE 50 MG/1
TABLET, EXTENDED RELEASE ORAL
Qty: 90 TABLET | Refills: 3 | Status: SHIPPED | OUTPATIENT
Start: 2018-09-27 | End: 2019-09-21 | Stop reason: SDUPTHER

## 2018-09-27 RX ORDER — AMLODIPINE BESYLATE AND BENAZEPRIL HYDROCHLORIDE 5; 20 MG/1; MG/1
CAPSULE ORAL
Qty: 90 CAPSULE | Refills: 3 | Status: SHIPPED | OUTPATIENT
Start: 2018-09-27 | End: 2019-09-21 | Stop reason: SDUPTHER

## 2018-09-27 RX ORDER — PRAVASTATIN SODIUM 80 MG/1
TABLET ORAL
Qty: 90 TABLET | Refills: 3 | Status: SHIPPED | OUTPATIENT
Start: 2018-09-27 | End: 2019-09-21 | Stop reason: SDUPTHER

## 2018-09-27 RX ORDER — HYDROCHLOROTHIAZIDE 12.5 MG/1
CAPSULE, GELATIN COATED ORAL
Qty: 90 CAPSULE | Refills: 3 | Status: SHIPPED | OUTPATIENT
Start: 2018-09-27 | End: 2019-09-21 | Stop reason: SDUPTHER

## 2018-12-12 ENCOUNTER — OFFICE VISIT (OUTPATIENT)
Dept: INTERNAL MEDICINE | Facility: CLINIC | Age: 73
End: 2018-12-12

## 2018-12-12 VITALS
RESPIRATION RATE: 16 BRPM | DIASTOLIC BLOOD PRESSURE: 72 MMHG | TEMPERATURE: 97.9 F | WEIGHT: 204.8 LBS | HEART RATE: 57 BPM | HEIGHT: 71 IN | OXYGEN SATURATION: 95 % | BODY MASS INDEX: 28.67 KG/M2 | SYSTOLIC BLOOD PRESSURE: 127 MMHG

## 2018-12-12 DIAGNOSIS — K42.9 UMBILICAL HERNIA WITHOUT OBSTRUCTION AND WITHOUT GANGRENE: Primary | ICD-10-CM

## 2018-12-12 PROCEDURE — 99213 OFFICE O/P EST LOW 20 MIN: CPT | Performed by: NURSE PRACTITIONER

## 2018-12-12 NOTE — PROGRESS NOTES
Subjective   Timi Wolf is a 73 y.o. male.   Chief Complaint   Patient presents with   • Follow-up     Pt this he may have a hernia       Patient presents for initial evaluation of possible umbilical hernia.  This is a 73-year-old male patient of Dr. barrera with a history of hypertension, hyperlipidemia, CAD.  He states that 1-2 weeks ago, he noticed a protrusion in the middle of his umbilicus when he is in a standing position.  He states that when he is lying in recumbent positions, the protrusion is not present.  He has noticed no discoloration to the umbilicus with a surrounding area.  He is having no abdominal pain, nausea, vomiting, or changes in bowel habits.  He denies constipation or diarrhea.  He states that he has had no abdominal surgery in the past.  He denies development of any other new issues at this time.         The following portions of the patient's history were reviewed and updated as appropriate: allergies, current medications, past family history, past medical history, past social history, past surgical history and problem list.    Review of Systems   Constitutional: Negative for activity change, chills, fatigue, fever, unexpected weight gain and unexpected weight loss.   HENT: Negative for congestion, hearing loss, postnasal drip, sinus pressure, sneezing, sore throat and tinnitus.    Eyes: Negative for photophobia, pain and visual disturbance.   Respiratory: Negative for cough, chest tightness, shortness of breath and wheezing.    Cardiovascular: Negative for chest pain, palpitations and leg swelling.   Gastrointestinal: Negative for abdominal distention, abdominal pain, anal bleeding, blood in stool, constipation, diarrhea, nausea, rectal pain, vomiting, GERD and indigestion.        Umbilical hernia   Endocrine: Negative for polydipsia, polyphagia and polyuria.   Genitourinary: Negative for dysuria, frequency, hematuria and urgency.   Neurological: Negative for dizziness, weakness,  "numbness and headache.   All other systems reviewed and are negative.      Objective    /72 (BP Location: Left arm, Patient Position: Sitting, Cuff Size: Small Adult)   Pulse 57   Temp 97.9 °F (36.6 °C) (Oral)   Resp 16   Ht 180.3 cm (71\")   Wt 92.9 kg (204 lb 12.8 oz)   SpO2 95%   BMI 28.56 kg/m²     Physical Exam   Constitutional: He is oriented to person, place, and time. He appears well-developed and well-nourished. No distress.   HENT:   Head: Atraumatic.   Eyes: Pupils are equal, round, and reactive to light.   Neck: Normal range of motion. Neck supple.   Cardiovascular: Normal rate, regular rhythm, normal heart sounds and intact distal pulses. Exam reveals no gallop and no friction rub.   No murmur heard.  Pulmonary/Chest: Effort normal and breath sounds normal. No stridor. No respiratory distress. He has no wheezes. He has no rales. He exhibits no tenderness.   Lungs are CTA bilaterally   Abdominal: Soft. Bowel sounds are normal. He exhibits no distension and no mass. There is no tenderness. There is no rigidity, no rebound, no guarding, no CVA tenderness, no tenderness at McBurney's point and negative Mcghee's sign. A hernia is present.   Bowel sounds active ×4 quadrants.  No pain to light or deep palpation ×4 quadrants.  Umbilical hernia present to the mid umbilicus when in standing position.  Retracts when in recumbent position.  No surrounding erythema or discoloration.   Musculoskeletal: Normal range of motion.   Neurological: He is alert and oriented to person, place, and time.   Skin: Skin is warm and dry. Capillary refill takes less than 2 seconds. He is not diaphoretic.   Psychiatric: He has a normal mood and affect. His behavior is normal.   Nursing note and vitals reviewed.        Assessment/Plan   Timi was seen today for follow-up.    Diagnoses and all orders for this visit:    Umbilical hernia without obstruction and without gangrene  -     Ambulatory Referral to General " Surgery    - Placed referral to general surgery for evaluation of umbilical hernia.  Encouraged patient to let us know if the hernia enlarges, and to proceed to the ED for development of acute abdominal pain.    - Follow-up if symptoms persist or worsen.

## 2019-01-08 ENCOUNTER — OFFICE VISIT (OUTPATIENT)
Dept: SURGERY | Facility: CLINIC | Age: 74
End: 2019-01-08

## 2019-01-08 VITALS — OXYGEN SATURATION: 98 % | HEIGHT: 71 IN | HEART RATE: 55 BPM | BODY MASS INDEX: 28.36 KG/M2 | WEIGHT: 202.6 LBS

## 2019-01-08 DIAGNOSIS — K42.9 UMBILICAL HERNIA WITHOUT OBSTRUCTION AND WITHOUT GANGRENE: Primary | ICD-10-CM

## 2019-01-08 PROCEDURE — 99202 OFFICE O/P NEW SF 15 MIN: CPT | Performed by: SURGERY

## 2019-01-08 NOTE — PROGRESS NOTES
Subjective   Timi Wolf is a 73 y.o. male who presents to the office in surgical consultation from Stanley Ruiz Jr., MD for an umbilical hernia.    History of Present Illness     The patient is very active and does intermittent strenuous activity and noticed a small bulge at the umbilicus.  He has had no pain in the area.  There has been no change in his bowel or bladder function.  He has not noticed a bulge getting larger.  It does not cause symptoms with strenuous activity.  He had discomfort at one point when leaning over a rail but no other symptoms.    Review of Systems   Constitutional: Negative for activity change, appetite change, fatigue and fever.   HENT: Negative for trouble swallowing and voice change.    Respiratory: Negative for chest tightness and shortness of breath.    Cardiovascular: Negative for chest pain and palpitations.   Gastrointestinal: Negative for abdominal pain, blood in stool, constipation, diarrhea, nausea and vomiting.   Endocrine: Negative for cold intolerance and heat intolerance.   Genitourinary: Negative for dysuria and flank pain.   Neurological: Negative for dizziness and light-headedness.   Hematological: Negative for adenopathy. Does not bruise/bleed easily.   Psychiatric/Behavioral: Negative for agitation and confusion.     Past Medical History:   Diagnosis Date   • Abdominal pain    • Arteriosclerotic cardiovascular disease    • Diverticulosis    • Dysphagia    • GERD (gastroesophageal reflux disease)    • Hyperlipidemia    • Hypertension    • Internal hemorrhoids    • Prostate enlargement    • Renal cyst, right      Past Surgical History:   Procedure Laterality Date   • ACHILLES TENDON REPAIR      ruptured tendon   • COLONOSCOPY N/A 2012    Dr Alvarado/[facility]   • SHOULDER SURGERY       Family History   Problem Relation Age of Onset   • Heart attack Father    • Heart disease Father      Social History     Socioeconomic History   • Marital status:      Spouse name: Not  on file   • Number of children: Not on file   • Years of education: Not on file   • Highest education level: Not on file   Social Needs   • Financial resource strain: Not on file   • Food insecurity - worry: Not on file   • Food insecurity - inability: Not on file   • Transportation needs - medical: Not on file   • Transportation needs - non-medical: Not on file   Occupational History   • Not on file   Tobacco Use   • Smoking status: Former Smoker   • Smokeless tobacco: Never Used   Substance and Sexual Activity   • Alcohol use: No     Frequency: Never   • Drug use: No   • Sexual activity: Defer   Other Topics Concern   • Not on file   Social History Narrative   • Not on file       Objective   Physical Exam   Constitutional: He is oriented to person, place, and time. He appears well-developed and well-nourished.  Non-toxic appearance.   Eyes: EOM are normal. No scleral icterus.   Pulmonary/Chest: Effort normal. No respiratory distress.   Abdominal: Soft. Normal appearance. There is no tenderness. A hernia (Small reducible umbilical hernia) is present. Hernia confirmed positive in the ventral area.   Neurological: He is alert and oriented to person, place, and time.   Skin: Skin is warm and dry.   Psychiatric: He has a normal mood and affect. His behavior is normal. Judgment and thought content normal.       Assessment/Plan       The encounter diagnosis was Umbilical hernia without obstruction and without gangrene.    The patient has a small reducible umbilical hernia with a defect too small to permit the small bowel.  Since it is asymptomatic, there is no need for umbilical hernia repair at this time.  He will return to the office if the bulge gets larger or it becomes symptomatic.

## 2019-05-17 RX ORDER — TAMSULOSIN HYDROCHLORIDE 0.4 MG/1
CAPSULE ORAL
Qty: 90 CAPSULE | Refills: 1 | Status: SHIPPED | OUTPATIENT
Start: 2019-05-17 | End: 2019-11-09 | Stop reason: SDUPTHER

## 2019-08-17 ENCOUNTER — RESULTS ENCOUNTER (OUTPATIENT)
Dept: INTERNAL MEDICINE | Facility: CLINIC | Age: 74
End: 2019-08-17

## 2019-08-17 DIAGNOSIS — B35.6 TINEA CRURIS: ICD-10-CM

## 2019-08-17 DIAGNOSIS — I10 ESSENTIAL HYPERTENSION: ICD-10-CM

## 2019-08-17 DIAGNOSIS — N40.0 PROSTATE ENLARGEMENT: ICD-10-CM

## 2019-08-17 DIAGNOSIS — E78.2 MIXED HYPERLIPIDEMIA: ICD-10-CM

## 2019-09-21 DIAGNOSIS — I15.9 SECONDARY HYPERTENSION: ICD-10-CM

## 2019-09-23 RX ORDER — METOPROLOL SUCCINATE 50 MG/1
TABLET, EXTENDED RELEASE ORAL
Qty: 90 TABLET | Refills: 4 | Status: SHIPPED | OUTPATIENT
Start: 2019-09-23 | End: 2020-12-07

## 2019-09-23 RX ORDER — HYDROCHLOROTHIAZIDE 12.5 MG/1
CAPSULE, GELATIN COATED ORAL
Qty: 90 CAPSULE | Refills: 4 | Status: SHIPPED | OUTPATIENT
Start: 2019-09-23 | End: 2020-12-07

## 2019-09-23 RX ORDER — AMLODIPINE BESYLATE AND BENAZEPRIL HYDROCHLORIDE 5; 20 MG/1; MG/1
CAPSULE ORAL
Qty: 90 CAPSULE | Refills: 4 | Status: SHIPPED | OUTPATIENT
Start: 2019-09-23 | End: 2020-12-08

## 2019-09-23 RX ORDER — PRAVASTATIN SODIUM 80 MG/1
TABLET ORAL
Qty: 90 TABLET | Refills: 4 | Status: SHIPPED | OUTPATIENT
Start: 2019-09-23 | End: 2020-12-07

## 2019-11-07 LAB
ALBUMIN SERPL-MCNC: 4.1 G/DL (ref 3.5–5.2)
ALBUMIN/GLOB SERPL: 1.8 G/DL
ALP SERPL-CCNC: 64 U/L (ref 39–117)
ALT SERPL-CCNC: 17 U/L (ref 1–41)
APPEARANCE UR: CLEAR
AST SERPL-CCNC: 16 U/L (ref 1–40)
BASOPHILS # BLD AUTO: 0.08 10*3/MM3 (ref 0–0.2)
BASOPHILS NFR BLD AUTO: 1.1 % (ref 0–1.5)
BILIRUB SERPL-MCNC: 0.7 MG/DL (ref 0.2–1.2)
BILIRUB UR QL STRIP: NEGATIVE
BUN SERPL-MCNC: 20 MG/DL (ref 8–23)
BUN/CREAT SERPL: 16.5 (ref 7–25)
CALCIUM SERPL-MCNC: 9.7 MG/DL (ref 8.6–10.5)
CHLORIDE SERPL-SCNC: 107 MMOL/L (ref 98–107)
CHOLEST SERPL-MCNC: 125 MG/DL (ref 0–200)
CHOLEST/HDLC SERPL: 3.21 {RATIO}
CO2 SERPL-SCNC: 25 MMOL/L (ref 22–29)
COLOR UR: YELLOW
CREAT SERPL-MCNC: 1.21 MG/DL (ref 0.76–1.27)
EOSINOPHIL # BLD AUTO: 0.26 10*3/MM3 (ref 0–0.4)
EOSINOPHIL NFR BLD AUTO: 3.4 % (ref 0.3–6.2)
ERYTHROCYTE [DISTWIDTH] IN BLOOD BY AUTOMATED COUNT: 12.7 % (ref 12.3–15.4)
GLOBULIN SER CALC-MCNC: 2.3 GM/DL
GLUCOSE SERPL-MCNC: 89 MG/DL (ref 65–99)
GLUCOSE UR QL: NEGATIVE
HCT VFR BLD AUTO: 46.7 % (ref 37.5–51)
HDLC SERPL-MCNC: 39 MG/DL (ref 40–60)
HGB BLD-MCNC: 15.6 G/DL (ref 13–17.7)
HGB UR QL STRIP: NEGATIVE
IMM GRANULOCYTES # BLD AUTO: 0.03 10*3/MM3 (ref 0–0.05)
IMM GRANULOCYTES NFR BLD AUTO: 0.4 % (ref 0–0.5)
KETONES UR QL STRIP: NEGATIVE
LDLC SERPL CALC-MCNC: 56 MG/DL (ref 0–100)
LEUKOCYTE ESTERASE UR QL STRIP: NEGATIVE
LYMPHOCYTES # BLD AUTO: 2.03 10*3/MM3 (ref 0.7–3.1)
LYMPHOCYTES NFR BLD AUTO: 26.9 % (ref 19.6–45.3)
MCH RBC QN AUTO: 30.4 PG (ref 26.6–33)
MCHC RBC AUTO-ENTMCNC: 33.4 G/DL (ref 31.5–35.7)
MCV RBC AUTO: 90.9 FL (ref 79–97)
MONOCYTES # BLD AUTO: 0.87 10*3/MM3 (ref 0.1–0.9)
MONOCYTES NFR BLD AUTO: 11.5 % (ref 5–12)
NEUTROPHILS # BLD AUTO: 4.29 10*3/MM3 (ref 1.7–7)
NEUTROPHILS NFR BLD AUTO: 56.7 % (ref 42.7–76)
NITRITE UR QL STRIP: NEGATIVE
NRBC BLD AUTO-RTO: 0 /100 WBC (ref 0–0.2)
PH UR STRIP: 5.5 [PH] (ref 5–8)
PLATELET # BLD AUTO: 167 10*3/MM3 (ref 140–450)
POTASSIUM SERPL-SCNC: 4.3 MMOL/L (ref 3.5–5.2)
PROT SERPL-MCNC: 6.4 G/DL (ref 6–8.5)
PROT UR QL STRIP: NEGATIVE
PSA FREE MFR SERPL: 22.9 %
PSA FREE SERPL-MCNC: 0.32 NG/ML
PSA SERPL-MCNC: 1.4 NG/ML (ref 0–4)
RBC # BLD AUTO: 5.14 10*6/MM3 (ref 4.14–5.8)
SODIUM SERPL-SCNC: 144 MMOL/L (ref 136–145)
SP GR UR: 1.02 (ref 1–1.03)
TRIGL SERPL-MCNC: 150 MG/DL (ref 0–150)
UROBILINOGEN UR STRIP-MCNC: NORMAL MG/DL
VLDLC SERPL CALC-MCNC: 30 MG/DL (ref 5–40)
WBC # BLD AUTO: 7.56 10*3/MM3 (ref 3.4–10.8)

## 2019-11-11 RX ORDER — TAMSULOSIN HYDROCHLORIDE 0.4 MG/1
CAPSULE ORAL
Qty: 90 CAPSULE | Refills: 1 | Status: SHIPPED | OUTPATIENT
Start: 2019-11-11 | End: 2020-05-25

## 2019-11-13 ENCOUNTER — OFFICE VISIT (OUTPATIENT)
Dept: INTERNAL MEDICINE | Facility: CLINIC | Age: 74
End: 2019-11-13

## 2019-11-13 VITALS
HEART RATE: 45 BPM | SYSTOLIC BLOOD PRESSURE: 147 MMHG | BODY MASS INDEX: 28.76 KG/M2 | WEIGHT: 205.4 LBS | DIASTOLIC BLOOD PRESSURE: 81 MMHG | RESPIRATION RATE: 16 BRPM | HEIGHT: 71 IN | OXYGEN SATURATION: 95 % | TEMPERATURE: 98.1 F

## 2019-11-13 DIAGNOSIS — I25.10 CHRONIC CORONARY ARTERY DISEASE: ICD-10-CM

## 2019-11-13 DIAGNOSIS — N40.0 PROSTATE ENLARGEMENT: ICD-10-CM

## 2019-11-13 DIAGNOSIS — E78.2 MIXED HYPERLIPIDEMIA: ICD-10-CM

## 2019-11-13 DIAGNOSIS — R00.1 BRADYCARDIA: ICD-10-CM

## 2019-11-13 DIAGNOSIS — Z00.00 MEDICARE ANNUAL WELLNESS VISIT, SUBSEQUENT: ICD-10-CM

## 2019-11-13 DIAGNOSIS — I10 ESSENTIAL HYPERTENSION: Primary | ICD-10-CM

## 2019-11-13 PROCEDURE — 99214 OFFICE O/P EST MOD 30 MIN: CPT | Performed by: FAMILY MEDICINE

## 2019-11-13 PROCEDURE — 96160 PT-FOCUSED HLTH RISK ASSMT: CPT | Performed by: FAMILY MEDICINE

## 2019-11-13 PROCEDURE — 93000 ELECTROCARDIOGRAM COMPLETE: CPT | Performed by: FAMILY MEDICINE

## 2019-11-13 PROCEDURE — G0439 PPPS, SUBSEQ VISIT: HCPCS | Performed by: FAMILY MEDICINE

## 2019-11-13 NOTE — PATIENT INSTRUCTIONS
Medicare Wellness  Personal Prevention Plan of Service     Date of Office Visit:  2019  Encounter Provider:  Stanley Ruiz Jr., MD  Place of Service:  [facility]  Patient Name: Timi Wolf  :  1945    As part of the Medicare Wellness portion of your visit today, we are providing you with this personalized preventive plan of services (PPPS). This plan is based upon recommendations of the [place] Preventive Services Task Force (USPSTF) and the Advisory Committee on Immunization Practices (ACIP).    This lists the preventive care services that should be considered, and provides dates of when you are due. Items listed as completed are up-to-date and do not require any further intervention.    Health Maintenance   Topic Date Due   • ZOSTER VACCINE (2 of 3) 2009   • HEPATITIS C SCREENING  2016   • AAA SCREEN (ONE-TIME)  2016   • MEDICARE ANNUAL WELLNESS  2018   • INFLUENZA VACCINE  2019   • LIPID PANEL  2020   • TDAP/TD VACCINES (3 - Td) 2024   • COLONOSCOPY  2025   • PNEUMOCOCCAL VACCINES (65+ LOW/MEDIUM RISK)  Completed       No orders of the defined types were placed in this encounter.      No Follow-up on file.

## 2019-11-13 NOTE — PROGRESS NOTES
Subjective   Timi Wolf is a 74 y.o. male.     Chief Complaint   Patient presents with   • Annual Exam   • Coronary Artery Disease   • Hypertension   • Hyperlipidemia     BPH    History of Present Illness   Very active gentleman who is retired  dealing with his elderly mother is a great of stress right now.  He has a history of CAD which is been stable for many years.  Treatment is with metoprolol plus antihypertensive plus high-dose pravastatin which is working well.  Labs are reviewed which look very good.  PSA has been stable over the past 4 years level of proxy 1.4.  Prior colonoscopy was normal as of 2015.  He has evidence of bradycardia we will get an EKG today.  He has not had any syncope or symptoms.      The following portions of the patient's history were reviewed and updated as appropriate: allergies, current medications, past social history and problem list.    Review of Systems   Constitutional: Negative.    HENT: Negative.    Eyes: Negative.    Respiratory: Negative.    Cardiovascular: Negative.    Gastrointestinal: Negative.    Endocrine: Negative.    Genitourinary: Negative.    Musculoskeletal: Negative.    Skin: Negative.    Allergic/Immunologic: Negative.    Neurological: Negative.    Hematological: Negative.    Psychiatric/Behavioral: Negative.        Objective   Vitals:    11/13/19 1224   BP: 147/81   Pulse: (!) 45   Resp: 16   Temp: 98.1 °F (36.7 °C)   SpO2: 95%     Physical Exam   Constitutional: He is oriented to person, place, and time. He appears well-developed and well-nourished.   HENT:   Head: Normocephalic and atraumatic.       Right Ear: Tympanic membrane and external ear normal.   Left Ear: Tympanic membrane and external ear normal.   Nose: Nose normal.   Mouth/Throat: Oropharynx is clear and moist.   Eyes: Conjunctivae and EOM are normal. Pupils are equal, round, and reactive to light.   Neck: Normal range of motion. Neck supple. No JVD present. No thyromegaly present.    Cardiovascular: Regular rhythm, normal heart sounds and intact distal pulses. Bradycardia present.   Pulmonary/Chest: Effort normal and breath sounds normal.   Abdominal: Soft. Bowel sounds are normal.   Musculoskeletal: Normal range of motion.   Lymphadenopathy:     He has no cervical adenopathy.   Neurological: He is alert and oriented to person, place, and time. No cranial nerve deficit. Coordination normal.   Skin: Skin is warm and dry. No rash noted.   Psychiatric: He has a normal mood and affect. His behavior is normal. Judgment and thought content normal.   Vitals reviewed.      Assessment/Plan   Problem List Items Addressed This Visit        Cardiovascular and Mediastinum    Chronic coronary artery disease    Relevant Orders    CBC & Differential    Comprehensive Metabolic Panel    Lipid Panel With / Chol / HDL Ratio    Urinalysis With Microscopic If Indicated (No Culture) - Urine, Clean Catch    TSH    T4, Free    PSA, Total & Free    ECG 12 Lead    Hyperlipidemia    Relevant Orders    CBC & Differential    Comprehensive Metabolic Panel    Lipid Panel With / Chol / HDL Ratio    Urinalysis With Microscopic If Indicated (No Culture) - Urine, Clean Catch    TSH    T4, Free    PSA, Total & Free    Hypertension - Primary    Relevant Orders    CBC & Differential    Comprehensive Metabolic Panel    Lipid Panel With / Chol / HDL Ratio    Urinalysis With Microscopic If Indicated (No Culture) - Urine, Clean Catch    TSH    T4, Free    PSA, Total & Free       Genitourinary    Prostate enlargement    Relevant Orders    CBC & Differential    Comprehensive Metabolic Panel    Lipid Panel With / Chol / HDL Ratio    Urinalysis With Microscopic If Indicated (No Culture) - Urine, Clean Catch    TSH    T4, Free    PSA, Total & Free      Other Visit Diagnoses     Bradycardia        Relevant Orders    CBC & Differential    Comprehensive Metabolic Panel    Lipid Panel With / Chol / HDL Ratio    Urinalysis With Microscopic If  Indicated (No Culture) - Urine, Clean Catch    TSH    T4, Free    PSA, Total & Free    ECG 12 Lead    Medicare annual wellness visit, subsequent          Plan: EKG meds the same recheck in a year sooner if needed labs preceding visit next year.

## 2019-11-13 NOTE — PROGRESS NOTES
Procedure     ECG 12 Lead  Date/Time: 11/13/2019 1:13 PM  Performed by: Stanley Ruiz Jr., MD  Authorized by: Stanley Ruiz Jr., MD   Comparison: not compared with previous ECG   Previous ECG: no previous ECG available  Rhythm: sinus bradycardia  Conduction: conduction normal  ST Segments: ST segments normal  T Waves: T waves normal  Other: no other findings    Clinical impression: normal ECG  Comments: Except for sinus bradycardia

## 2020-05-25 RX ORDER — TAMSULOSIN HYDROCHLORIDE 0.4 MG/1
CAPSULE ORAL
Qty: 90 CAPSULE | Refills: 3 | Status: SHIPPED | OUTPATIENT
Start: 2020-05-25 | End: 2021-06-04

## 2020-07-08 ENCOUNTER — TELEPHONE (OUTPATIENT)
Dept: INTERNAL MEDICINE | Facility: CLINIC | Age: 75
End: 2020-07-08

## 2020-07-08 NOTE — TELEPHONE ENCOUNTER
PATIENT CALLED STATING HAS RECEIVED A LETTER FROM HIS GI DOCTOR TO SCHEDULE AN APPT FOR A COLONOSCOPY. PATIENT INQUIRED IF INSTEAD OF A COLONOSCOPY, COULD DO A COLOGUARD OR SOMETHING LIKE IT.    PLEASE ADVISE    [phone]

## 2020-07-13 ENCOUNTER — RESULTS ENCOUNTER (OUTPATIENT)
Dept: INTERNAL MEDICINE | Facility: CLINIC | Age: 75
End: 2020-07-13

## 2020-07-13 DIAGNOSIS — Z12.11 COLON CANCER SCREENING: Primary | ICD-10-CM

## 2020-07-13 DIAGNOSIS — Z12.11 COLON CANCER SCREENING: ICD-10-CM

## 2020-07-13 NOTE — TELEPHONE ENCOUNTER
Prior colonoscopy was normal.  He would qualify for cologuard.  Please tell him I ordered it and it will arrive by [employer].

## 2020-11-13 ENCOUNTER — RESULTS ENCOUNTER (OUTPATIENT)
Dept: INTERNAL MEDICINE | Facility: CLINIC | Age: 75
End: 2020-11-13

## 2020-11-13 DIAGNOSIS — I10 ESSENTIAL HYPERTENSION: ICD-10-CM

## 2020-11-13 DIAGNOSIS — R00.1 BRADYCARDIA: ICD-10-CM

## 2020-11-13 DIAGNOSIS — N40.0 PROSTATE ENLARGEMENT: ICD-10-CM

## 2020-11-13 DIAGNOSIS — E78.2 MIXED HYPERLIPIDEMIA: ICD-10-CM

## 2020-11-13 DIAGNOSIS — I25.10 CHRONIC CORONARY ARTERY DISEASE: ICD-10-CM

## 2020-11-18 ENCOUNTER — OFFICE VISIT (OUTPATIENT)
Dept: INTERNAL MEDICINE | Facility: CLINIC | Age: 75
End: 2020-11-18

## 2020-11-18 VITALS
OXYGEN SATURATION: 95 % | WEIGHT: 207 LBS | BODY MASS INDEX: 28.98 KG/M2 | DIASTOLIC BLOOD PRESSURE: 74 MMHG | HEIGHT: 71 IN | RESPIRATION RATE: 17 BRPM | SYSTOLIC BLOOD PRESSURE: 138 MMHG | HEART RATE: 59 BPM | TEMPERATURE: 98.2 F

## 2020-11-18 DIAGNOSIS — E78.2 MIXED HYPERLIPIDEMIA: ICD-10-CM

## 2020-11-18 DIAGNOSIS — N30.10 INTERSTITIAL CYSTITIS (CHRONIC) WITHOUT HEMATURIA: ICD-10-CM

## 2020-11-18 DIAGNOSIS — I10 ESSENTIAL HYPERTENSION: ICD-10-CM

## 2020-11-18 DIAGNOSIS — N40.0 PROSTATE ENLARGEMENT: ICD-10-CM

## 2020-11-18 DIAGNOSIS — Z00.00 MEDICARE ANNUAL WELLNESS VISIT, SUBSEQUENT: ICD-10-CM

## 2020-11-18 DIAGNOSIS — R73.09 ELEVATED GLUCOSE: ICD-10-CM

## 2020-11-18 DIAGNOSIS — I25.10 CHRONIC CORONARY ARTERY DISEASE: Primary | ICD-10-CM

## 2020-11-18 LAB
BILIRUB UR QL STRIP: NEGATIVE
CLARITY UR: CLEAR
COLOR UR: YELLOW
GLUCOSE UR STRIP-MCNC: NEGATIVE MG/DL
HGB UR QL STRIP.AUTO: NEGATIVE
KETONES UR QL STRIP: NEGATIVE
LEUKOCYTE ESTERASE UR QL STRIP.AUTO: NEGATIVE
NITRITE UR QL STRIP: NEGATIVE
PH UR STRIP.AUTO: <=5 [PH] (ref 5–8)
PROT UR QL STRIP: NEGATIVE
SP GR UR STRIP: >=1.03 (ref 1–1.03)
UROBILINOGEN UR QL STRIP: NORMAL

## 2020-11-18 PROCEDURE — 99214 OFFICE O/P EST MOD 30 MIN: CPT | Performed by: FAMILY MEDICINE

## 2020-11-18 PROCEDURE — 96160 PT-FOCUSED HLTH RISK ASSMT: CPT | Performed by: FAMILY MEDICINE

## 2020-11-18 PROCEDURE — 81003 URINALYSIS AUTO W/O SCOPE: CPT | Performed by: FAMILY MEDICINE

## 2020-11-18 PROCEDURE — G0439 PPPS, SUBSEQ VISIT: HCPCS | Performed by: FAMILY MEDICINE

## 2020-11-18 NOTE — PROGRESS NOTES
The ABCs of the Annual Wellness Visit  Subsequent Medicare Wellness Visit    Chief Complaint   Patient presents with   • Medicare Wellness-subsequent       Subjective   History of Present Illness:  Timi Wolf is a 75 y.o. male who presents for a Subsequent Medicare Wellness Visit.    HEALTH RISK ASSESSMENT    Recent Hospitalizations:  No hospitalization(s) within the last year.    Current Medical Providers:  Patient Care Team:  Stanley Ruiz Jr., MD as PCP - General (Family Medicine)  Chriss Villatoro Jr., MD as Consulting Physician (Urology)    Smoking Status:  Social History     Tobacco Use   Smoking Status Former Smoker   Smokeless Tobacco Never Used       Alcohol Consumption:  Social History     Substance and Sexual Activity   Alcohol Use No   • Frequency: Never       Depression Screen:   PHQ-2/PHQ-9 Depression Screening 11/18/2020   Little interest or pleasure in doing things 0   Feeling down, depressed, or hopeless 0   Trouble falling or staying asleep, or sleeping too much -   Feeling tired or having little energy -   Poor appetite or overeating -   Feeling bad about yourself - or that you are a failure or have let yourself or your family down -   Trouble concentrating on things, such as reading the newspaper or watching television -   Moving or speaking so slowly that other people could have noticed. Or the opposite - being so fidgety or restless that you have been moving around a lot more than usual -   Thoughts that you would be better off dead, or of hurting yourself in some way -   Total Score 0   If you checked off any problems, how difficult have these problems made it for you to do your work, take care of things at home, or get along with other people? -       Fall Risk Screen:  STEADI Fall Risk Assessment was completed, and patient is at LOW risk for falls.Assessment completed on:11/18/2020    Health Habits and Functional and Cognitive Screening:  Functional & Cognitive Status 11/18/2020   Do  you have difficulty preparing food and eating? No   Do you have difficulty bathing yourself, getting dressed or grooming yourself? No   Do you have difficulty using the toilet? No   Do you have difficulty moving around from place to place? No   Do you have trouble with steps or getting out of a bed or a chair? No   Current Diet Well Balanced Diet   Dental Exam Up to date   Eye Exam Up to date   Exercise (times per week) 3 times per week   Current Exercise Activities Include Walking   Do you need help using the phone?  No   Are you deaf or do you have serious difficulty hearing?  No   Do you need help with transportation? No   Do you need help shopping? No   Do you need help preparing meals?  No   Do you need help with housework?  No   Do you need help with laundry? No   Do you need help taking your medications? No   Do you need help managing money? No   Do you ever drive or ride in a car without wearing a seat belt? No   Have you felt unusual stress, anger or loneliness in the last month? No   Who do you live with? Spouse   If you need help, do you have trouble finding someone available to you? No   Have you been bothered in the last four weeks by sexual problems? No   Do you have difficulty concentrating, remembering or making decisions? No         Does the patient have evidence of cognitive impairment? No    Asprin use counseling:Taking ASA appropriately as indicated    Age-appropriate Screening Schedule:  Refer to the list below for future screening recommendations based on patient's age, sex and/or medical conditions. Orders for these recommended tests are listed in the plan section. The patient has been provided with a written plan.    Health Maintenance   Topic Date Due   • ZOSTER VACCINE (2 of 3) 02/13/2009   • LIPID PANEL  11/06/2020   • TDAP/TD VACCINES (3 - Td) 05/18/2024   • COLONOSCOPY  08/28/2025   • INFLUENZA VACCINE  Completed          The following portions of the patient's history were reviewed and  "updated as appropriate: allergies, current medications, past family history, past medical history, past social history, past surgical history and problem list.    Outpatient Medications Prior to Visit   Medication Sig Dispense Refill   • amLODIPine-benazepril (LOTREL 5-20) 5-20 MG per capsule TAKE 1 CAPSULE DAILY 90 capsule 4   • aspirin 81 MG tablet Take 81 mg by mouth daily.     • Coenzyme Q10 150 MG capsule Take 1 tablet by mouth daily.     • hydrochlorothiazide (MICROZIDE) 12.5 MG capsule TAKE 1 CAPSULE EVERY MORNING 90 capsule 4   • Melatonin 3 MG capsule Take  by mouth Every Night.     • metoprolol succinate XL (TOPROL-XL) 50 MG 24 hr tablet TAKE 1 TABLET DAILY 90 tablet 4   • pravastatin (PRAVACHOL) 80 MG tablet TAKE 1 TABLET EVERY NIGHT 90 tablet 4   • tamsulosin (FLOMAX) 0.4 MG capsule 24 hr capsule TAKE 1 CAPSULE DAILY 90 capsule 3     No facility-administered medications prior to visit.        Patient Active Problem List   Diagnosis   • Chronic coronary artery disease   • Hyperlipidemia   • Hypertension   • Interstitial cystitis (chronic) without hematuria   • Prostate enlargement       Advanced Care Planning:  ACP discussion was held with the patient during this visit. Patient has an advance directive in EMR which is still valid.     Review of Systems    Compared to one year ago, the patient feels his physical health is the same.  Compared to one year ago, the patient feels his mental health is the same.    Reviewed chart for potential of high risk medication in the elderly: not applicable  Reviewed chart for potential of harmful drug interactions in the elderly:not applicable    Objective         Vitals:    11/18/20 1145   BP: 138/74   BP Location: Right arm   Patient Position: Sitting   Cuff Size: Small Adult   Pulse: 59   Resp: 17   Temp: 98.2 °F (36.8 °C)   TempSrc: Oral   SpO2: 95%   Weight: 93.9 kg (207 lb)   Height: 180.3 cm (71\")       Body mass index is 28.87 kg/m².  Discussed the patient's BMI " with him. The BMI is in the acceptable range.    Physical Exam          Assessment/Plan   Medicare Risks and Personalized Health Plan  CMS Preventative Services Quick Reference  Cardiovascular risk    The above risks/problems have been discussed with the patient.  Pertinent information has been shared with the patient in the After Visit Summary.  Follow up plans and orders are seen below in the Assessment/Plan Section.    Diagnoses and all orders for this visit:    1. Chronic coronary artery disease (Primary)    2. Mixed hyperlipidemia    3. Essential hypertension      Follow Up:  No follow-ups on file.     An After Visit Summary and PPPS were given to the patient.             Answers for HPI/ROS submitted by the patient on 11/16/2020   What is the primary reason for your visit?: Physical

## 2020-11-18 NOTE — PROGRESS NOTES
Subjective   Timi Wolf is a 75 y.o. male.     Chief Complaint   Patient presents with   • Medicare Wellness-subsequent         History of Present Illness   Delightful gentleman here for Medicare wellness.  He is actually treated for hypertension hyperlipidemia.  There is a history of CAD but he has not seen cardiology.  He is asymptomatic he is on pravastatin high-dose 80 mg nightly.    Detailed history of BPH treatment with Dr. Eng plus treatments of interstitial cystitis.  Has been quiescent with dietary restriction and decreasing artificial sweeteners and also caffeine/coffee.    Patient has had negative Cologuard in reference to colon cancer screening.      The following portions of the patient's history were reviewed and updated as appropriate: allergies, current medications, past social history and problem list.    Review of Systems   Constitutional: Negative.    HENT: Negative.    Eyes: Negative.    Respiratory: Negative.    Cardiovascular: Negative.    Gastrointestinal: Negative.    Endocrine: Negative.    Genitourinary: Negative.    Musculoskeletal: Negative.    Skin: Negative.    Allergic/Immunologic: Negative.    Neurological: Negative.    Hematological: Negative.    Psychiatric/Behavioral: Negative.        Objective   Vitals:    11/18/20 1145   BP: 138/74   Pulse: 59   Resp: 17   Temp: 98.2 °F (36.8 °C)   SpO2: 95%     Physical Exam  Vitals signs reviewed.   Constitutional:       Appearance: He is well-developed.   HENT:      Head: Normocephalic and atraumatic.      Right Ear: Tympanic membrane and external ear normal.      Left Ear: Tympanic membrane and external ear normal.      Nose: Nose normal.   Eyes:      Conjunctiva/sclera: Conjunctivae normal.      Pupils: Pupils are equal, round, and reactive to light.   Neck:      Musculoskeletal: Normal range of motion and neck supple.      Thyroid: No thyromegaly.      Vascular: No JVD.   Cardiovascular:      Rate and Rhythm: Normal rate and regular  rhythm.      Heart sounds: Normal heart sounds.   Pulmonary:      Effort: Pulmonary effort is normal.      Breath sounds: Normal breath sounds.   Abdominal:      General: Bowel sounds are normal.      Palpations: Abdomen is soft.   Musculoskeletal: Normal range of motion.   Lymphadenopathy:      Cervical: No cervical adenopathy.   Skin:     General: Skin is warm and dry.      Findings: No rash.   Neurological:      Mental Status: He is alert and oriented to person, place, and time.      Cranial Nerves: No cranial nerve deficit.      Coordination: Coordination normal.   Psychiatric:         Behavior: Behavior normal.         Thought Content: Thought content normal.         Judgment: Judgment normal.         Assessment/Plan   Problems Addressed this Visit        Cardiovascular and Mediastinum    Chronic coronary artery disease - Primary    Relevant Orders    CBC & Differential    Comprehensive Metabolic Panel    Hemoglobin A1c    Lipid Panel With / Chol / HDL Ratio    Urinalysis With Culture If Indicated -    TSH    T4, Free    T3, Free    PSA, Total & Free    Hyperlipidemia    Relevant Orders    CBC & Differential    Comprehensive Metabolic Panel    Hemoglobin A1c    Lipid Panel With / Chol / HDL Ratio    Urinalysis With Culture If Indicated -    TSH    T4, Free    T3, Free    PSA, Total & Free    Hypertension    Relevant Orders    CBC & Differential    Comprehensive Metabolic Panel    Hemoglobin A1c    Lipid Panel With / Chol / HDL Ratio    Urinalysis With Culture If Indicated -    TSH    T4, Free    T3, Free    PSA, Total & Free       Genitourinary    Interstitial cystitis (chronic) without hematuria    Relevant Orders    CBC & Differential    Comprehensive Metabolic Panel    Hemoglobin A1c    Lipid Panel With / Chol / HDL Ratio    Urinalysis With Culture If Indicated -    TSH    T4, Free    T3, Free    PSA, Total & Free    Prostate enlargement    Relevant Orders    CBC & Differential    Comprehensive Metabolic  Panel    Hemoglobin A1c    Lipid Panel With / Chol / HDL Ratio    Urinalysis With Culture If Indicated -    TSH    T4, Free    T3, Free    PSA, Total & Free      Other Visit Diagnoses     Medicare annual wellness visit, subsequent        Elevated glucose         Relevant Orders    Hemoglobin A1c      Diagnoses       Codes Comments    Chronic coronary artery disease    -  Primary ICD-10-CM: I25.10  ICD-9-CM: 414.00     Mixed hyperlipidemia     ICD-10-CM: E78.2  ICD-9-CM: 272.2     Essential hypertension     ICD-10-CM: I10  ICD-9-CM: 401.9     Interstitial cystitis (chronic) without hematuria     ICD-10-CM: N30.10  ICD-9-CM: 595.1     Prostate enlargement     ICD-10-CM: N40.0  ICD-9-CM: 600.00     Medicare annual wellness visit, subsequent     ICD-10-CM: Z00.00  ICD-9-CM: V70.0     Elevated glucose      ICD-10-CM: R73.09  ICD-9-CM: 790.29       Plan: Medications continued current levels.  He does not wish to see urology.  Recheck in year or sooner if needed.  Labs pending.  Healthy lifestyle discussed.  He will remain active which he is.    Answers for HPI/ROS submitted by the patient on 11/16/2020   What is the primary reason for your visit?: Physical

## 2020-11-19 LAB
ALBUMIN SERPL-MCNC: 4.3 G/DL (ref 3.5–5.2)
ALBUMIN/GLOB SERPL: 2.4 G/DL
ALP SERPL-CCNC: 69 U/L (ref 39–117)
ALT SERPL-CCNC: 19 U/L (ref 1–41)
AST SERPL-CCNC: 18 U/L (ref 1–40)
BASOPHILS # BLD AUTO: 0.06 10*3/MM3 (ref 0–0.2)
BASOPHILS NFR BLD AUTO: 0.9 % (ref 0–1.5)
BILIRUB SERPL-MCNC: 0.5 MG/DL (ref 0–1.2)
BUN SERPL-MCNC: 24 MG/DL (ref 8–23)
BUN/CREAT SERPL: 19.2 (ref 7–25)
CALCIUM SERPL-MCNC: 9.9 MG/DL (ref 8.6–10.5)
CHLORIDE SERPL-SCNC: 106 MMOL/L (ref 98–107)
CHOLEST SERPL-MCNC: 132 MG/DL (ref 0–200)
CHOLEST/HDLC SERPL: 3.14 {RATIO}
CO2 SERPL-SCNC: 21 MMOL/L (ref 22–29)
CREAT SERPL-MCNC: 1.25 MG/DL (ref 0.76–1.27)
EOSINOPHIL # BLD AUTO: 0.3 10*3/MM3 (ref 0–0.4)
EOSINOPHIL NFR BLD AUTO: 4.6 % (ref 0.3–6.2)
ERYTHROCYTE [DISTWIDTH] IN BLOOD BY AUTOMATED COUNT: 12.2 % (ref 12.3–15.4)
GLOBULIN SER CALC-MCNC: 1.8 GM/DL
GLUCOSE SERPL-MCNC: 88 MG/DL (ref 65–99)
HBA1C MFR BLD: 5.5 % (ref 4.8–5.6)
HCT VFR BLD AUTO: 46.7 % (ref 37.5–51)
HDLC SERPL-MCNC: 42 MG/DL (ref 40–60)
HGB BLD-MCNC: 16 G/DL (ref 13–17.7)
IMM GRANULOCYTES # BLD AUTO: 0.03 10*3/MM3 (ref 0–0.05)
IMM GRANULOCYTES NFR BLD AUTO: 0.5 % (ref 0–0.5)
LDLC SERPL CALC-MCNC: 75 MG/DL (ref 0–100)
LYMPHOCYTES # BLD AUTO: 1.87 10*3/MM3 (ref 0.7–3.1)
LYMPHOCYTES NFR BLD AUTO: 28.5 % (ref 19.6–45.3)
MCH RBC QN AUTO: 31.1 PG (ref 26.6–33)
MCHC RBC AUTO-ENTMCNC: 34.3 G/DL (ref 31.5–35.7)
MCV RBC AUTO: 90.7 FL (ref 79–97)
MONOCYTES # BLD AUTO: 0.9 10*3/MM3 (ref 0.1–0.9)
MONOCYTES NFR BLD AUTO: 13.7 % (ref 5–12)
NEUTROPHILS # BLD AUTO: 3.39 10*3/MM3 (ref 1.7–7)
NEUTROPHILS NFR BLD AUTO: 51.8 % (ref 42.7–76)
NRBC BLD AUTO-RTO: 0 /100 WBC (ref 0–0.2)
PLATELET # BLD AUTO: 166 10*3/MM3 (ref 140–450)
POTASSIUM SERPL-SCNC: 4.6 MMOL/L (ref 3.5–5.2)
PROT SERPL-MCNC: 6.1 G/DL (ref 6–8.5)
PSA FREE MFR SERPL: 24.4 %
PSA FREE SERPL-MCNC: 0.39 NG/ML
PSA SERPL-MCNC: 1.6 NG/ML (ref 0–4)
RBC # BLD AUTO: 5.15 10*6/MM3 (ref 4.14–5.8)
SODIUM SERPL-SCNC: 141 MMOL/L (ref 136–145)
T3FREE SERPL-MCNC: 3.4 PG/ML (ref 2–4.4)
T4 FREE SERPL-MCNC: 1.22 NG/DL (ref 0.93–1.7)
TRIGL SERPL-MCNC: 75 MG/DL (ref 0–150)
TSH SERPL DL<=0.005 MIU/L-ACNC: 1.45 UIU/ML (ref 0.27–4.2)
VLDLC SERPL CALC-MCNC: 15 MG/DL (ref 5–40)
WBC # BLD AUTO: 6.55 10*3/MM3 (ref 3.4–10.8)

## 2020-12-07 DIAGNOSIS — I15.9 SECONDARY HYPERTENSION: ICD-10-CM

## 2020-12-07 RX ORDER — HYDROCHLOROTHIAZIDE 12.5 MG/1
CAPSULE, GELATIN COATED ORAL
Qty: 90 CAPSULE | Refills: 1 | Status: SHIPPED | OUTPATIENT
Start: 2020-12-07 | End: 2021-03-25 | Stop reason: HOSPADM

## 2020-12-07 RX ORDER — METOPROLOL SUCCINATE 50 MG/1
TABLET, EXTENDED RELEASE ORAL
Qty: 90 TABLET | Refills: 1 | Status: SHIPPED | OUTPATIENT
Start: 2020-12-07 | End: 2021-06-04

## 2020-12-07 RX ORDER — PRAVASTATIN SODIUM 80 MG/1
TABLET ORAL
Qty: 90 TABLET | Refills: 1 | Status: SHIPPED | OUTPATIENT
Start: 2020-12-07 | End: 2021-06-04

## 2020-12-08 RX ORDER — AMLODIPINE BESYLATE AND BENAZEPRIL HYDROCHLORIDE 5; 20 MG/1; MG/1
CAPSULE ORAL
Qty: 90 CAPSULE | Refills: 3 | Status: SHIPPED | OUTPATIENT
Start: 2020-12-08 | End: 2021-11-18

## 2021-03-09 DIAGNOSIS — Z23 IMMUNIZATION DUE: ICD-10-CM

## 2021-03-22 ENCOUNTER — APPOINTMENT (OUTPATIENT)
Dept: GENERAL RADIOLOGY | Facility: HOSPITAL | Age: 76
End: 2021-03-22

## 2021-03-22 ENCOUNTER — APPOINTMENT (OUTPATIENT)
Dept: CT IMAGING | Facility: HOSPITAL | Age: 76
End: 2021-03-22

## 2021-03-22 ENCOUNTER — HOSPITAL ENCOUNTER (INPATIENT)
Facility: HOSPITAL | Age: 76
LOS: 3 days | Discharge: HOME | End: 2021-03-25
Attending: EMERGENCY MEDICINE | Admitting: INTERNAL MEDICINE

## 2021-03-22 DIAGNOSIS — R10.13 EPIGASTRIC PAIN: Primary | ICD-10-CM

## 2021-03-22 DIAGNOSIS — I81 PORTAL VEIN THROMBOSIS: ICD-10-CM

## 2021-03-22 PROBLEM — D68.59 HYPERCOAGULABLE STATE (HCC): Status: ACTIVE | Noted: 2021-03-22

## 2021-03-22 PROBLEM — N18.9 CKD (CHRONIC KIDNEY DISEASE): Status: ACTIVE | Noted: 2021-03-22

## 2021-03-22 LAB
ALBUMIN SERPL-MCNC: 3.6 G/DL (ref 3.5–5.2)
ALBUMIN/GLOB SERPL: 1.2 G/DL
ALP SERPL-CCNC: 94 U/L (ref 39–117)
ALPHA-FETOPROTEIN: 2.66 NG/ML (ref 0–8.3)
ALT SERPL W P-5'-P-CCNC: 17 U/L (ref 1–41)
ANION GAP SERPL CALCULATED.3IONS-SCNC: 8.5 MMOL/L (ref 5–15)
APTT PPP: 47.2 SECONDS (ref 22.7–35.4)
APTT PPP: >200 SECONDS (ref 22.7–35.4)
AST SERPL-CCNC: 17 U/L (ref 1–40)
BASOPHILS # BLD AUTO: 0.05 10*3/MM3 (ref 0–0.2)
BASOPHILS # BLD AUTO: 0.05 10*3/MM3 (ref 0–0.2)
BASOPHILS NFR BLD AUTO: 0.9 % (ref 0–1.5)
BASOPHILS NFR BLD AUTO: 0.9 % (ref 0–1.5)
BILIRUB SERPL-MCNC: 0.5 MG/DL (ref 0–1.2)
BILIRUB UR QL STRIP: NEGATIVE
BUN SERPL-MCNC: 26 MG/DL (ref 8–23)
BUN/CREAT SERPL: 19.1 (ref 7–25)
CALCIUM SPEC-SCNC: 9.5 MG/DL (ref 8.6–10.5)
CANCER AG19-9 SERPL-ACNC: 3.7 U/ML
CEA SERPL-MCNC: 1.3 NG/ML
CHLORIDE SERPL-SCNC: 106 MMOL/L (ref 98–107)
CLARITY UR: CLEAR
CO2 SERPL-SCNC: 22.5 MMOL/L (ref 22–29)
COLOR UR: YELLOW
CREAT SERPL-MCNC: 1.36 MG/DL (ref 0.76–1.27)
DEPRECATED RDW RBC AUTO: 39 FL (ref 37–54)
DEPRECATED RDW RBC AUTO: 39.1 FL (ref 37–54)
EOSINOPHIL # BLD AUTO: 0.26 10*3/MM3 (ref 0–0.4)
EOSINOPHIL # BLD AUTO: 0.32 10*3/MM3 (ref 0–0.4)
EOSINOPHIL NFR BLD AUTO: 4.7 % (ref 0.3–6.2)
EOSINOPHIL NFR BLD AUTO: 5.9 % (ref 0.3–6.2)
ERYTHROCYTE [DISTWIDTH] IN BLOOD BY AUTOMATED COUNT: 12 % (ref 12.3–15.4)
ERYTHROCYTE [DISTWIDTH] IN BLOOD BY AUTOMATED COUNT: 12.1 % (ref 12.3–15.4)
GFR SERPL CREATININE-BSD FRML MDRD: 51 ML/MIN/1.73
GLOBULIN UR ELPH-MCNC: 3 GM/DL
GLUCOSE SERPL-MCNC: 110 MG/DL (ref 65–99)
GLUCOSE UR STRIP-MCNC: NEGATIVE MG/DL
HCT VFR BLD AUTO: 41.8 % (ref 37.5–51)
HCT VFR BLD AUTO: 42 % (ref 37.5–51)
HGB BLD-MCNC: 14.1 G/DL (ref 13–17.7)
HGB BLD-MCNC: 14.8 G/DL (ref 13–17.7)
HGB UR QL STRIP.AUTO: NEGATIVE
HOLD SPECIMEN: NORMAL
HOLD SPECIMEN: NORMAL
IMM GRANULOCYTES # BLD AUTO: 0.01 10*3/MM3 (ref 0–0.05)
IMM GRANULOCYTES # BLD AUTO: 0.03 10*3/MM3 (ref 0–0.05)
IMM GRANULOCYTES NFR BLD AUTO: 0.2 % (ref 0–0.5)
IMM GRANULOCYTES NFR BLD AUTO: 0.5 % (ref 0–0.5)
INR PPP: 1.07 (ref 0.9–1.1)
KETONES UR QL STRIP: NEGATIVE
LEUKOCYTE ESTERASE UR QL STRIP.AUTO: NEGATIVE
LIPASE SERPL-CCNC: 51 U/L (ref 13–60)
LYMPHOCYTES # BLD AUTO: 1.36 10*3/MM3 (ref 0.7–3.1)
LYMPHOCYTES # BLD AUTO: 1.58 10*3/MM3 (ref 0.7–3.1)
LYMPHOCYTES NFR BLD AUTO: 24.5 % (ref 19.6–45.3)
LYMPHOCYTES NFR BLD AUTO: 28.9 % (ref 19.6–45.3)
MCH RBC QN AUTO: 30 PG (ref 26.6–33)
MCH RBC QN AUTO: 31.1 PG (ref 26.6–33)
MCHC RBC AUTO-ENTMCNC: 33.7 G/DL (ref 31.5–35.7)
MCHC RBC AUTO-ENTMCNC: 35.2 G/DL (ref 31.5–35.7)
MCV RBC AUTO: 88.2 FL (ref 79–97)
MCV RBC AUTO: 88.9 FL (ref 79–97)
MONOCYTES # BLD AUTO: 0.7 10*3/MM3 (ref 0.1–0.9)
MONOCYTES # BLD AUTO: 0.71 10*3/MM3 (ref 0.1–0.9)
MONOCYTES NFR BLD AUTO: 12.6 % (ref 5–12)
MONOCYTES NFR BLD AUTO: 13 % (ref 5–12)
NEUTROPHILS NFR BLD AUTO: 2.8 10*3/MM3 (ref 1.7–7)
NEUTROPHILS NFR BLD AUTO: 3.16 10*3/MM3 (ref 1.7–7)
NEUTROPHILS NFR BLD AUTO: 51.1 % (ref 42.7–76)
NEUTROPHILS NFR BLD AUTO: 56.8 % (ref 42.7–76)
NITRITE UR QL STRIP: NEGATIVE
NRBC BLD AUTO-RTO: 0 /100 WBC (ref 0–0.2)
NRBC BLD AUTO-RTO: 0 /100 WBC (ref 0–0.2)
PH UR STRIP.AUTO: 5.5 [PH] (ref 5–8)
PLATELET # BLD AUTO: 173 10*3/MM3 (ref 140–450)
PLATELET # BLD AUTO: 182 10*3/MM3 (ref 140–450)
PMV BLD AUTO: 10 FL (ref 6–12)
PMV BLD AUTO: 9.4 FL (ref 6–12)
POTASSIUM SERPL-SCNC: 3.8 MMOL/L (ref 3.5–5.2)
PROT SERPL-MCNC: 6.6 G/DL (ref 6–8.5)
PROT UR QL STRIP: ABNORMAL
PROTHROMBIN TIME: 13.7 SECONDS (ref 11.7–14.2)
QT INTERVAL: 421 MS
RBC # BLD AUTO: 4.7 10*6/MM3 (ref 4.14–5.8)
RBC # BLD AUTO: 4.76 10*6/MM3 (ref 4.14–5.8)
SARS-COV-2 ORF1AB RESP QL NAA+PROBE: NOT DETECTED
SODIUM SERPL-SCNC: 137 MMOL/L (ref 136–145)
SP GR UR STRIP: 1.02 (ref 1–1.03)
TROPONIN T SERPL-MCNC: <0.01 NG/ML (ref 0–0.03)
UROBILINOGEN UR QL STRIP: ABNORMAL
WBC # BLD AUTO: 5.47 10*3/MM3 (ref 3.4–10.8)
WBC # BLD AUTO: 5.56 10*3/MM3 (ref 3.4–10.8)
WHOLE BLOOD HOLD SPECIMEN: NORMAL
WHOLE BLOOD HOLD SPECIMEN: NORMAL

## 2021-03-22 PROCEDURE — 81003 URINALYSIS AUTO W/O SCOPE: CPT

## 2021-03-22 PROCEDURE — 99284 EMERGENCY DEPT VISIT MOD MDM: CPT

## 2021-03-22 PROCEDURE — 99221 1ST HOSP IP/OBS SF/LOW 40: CPT | Performed by: STUDENT IN AN ORGANIZED HEALTH CARE EDUCATION/TRAINING PROGRAM

## 2021-03-22 PROCEDURE — 93010 ELECTROCARDIOGRAM REPORT: CPT | Performed by: INTERNAL MEDICINE

## 2021-03-22 PROCEDURE — 36415 COLL VENOUS BLD VENIPUNCTURE: CPT

## 2021-03-22 PROCEDURE — 82105 ALPHA-FETOPROTEIN SERUM: CPT | Performed by: INTERNAL MEDICINE

## 2021-03-22 PROCEDURE — 85610 PROTHROMBIN TIME: CPT | Performed by: EMERGENCY MEDICINE

## 2021-03-22 PROCEDURE — U0004 COV-19 TEST NON-CDC HGH THRU: HCPCS | Performed by: EMERGENCY MEDICINE

## 2021-03-22 PROCEDURE — 83690 ASSAY OF LIPASE: CPT

## 2021-03-22 PROCEDURE — 80053 COMPREHEN METABOLIC PANEL: CPT

## 2021-03-22 PROCEDURE — 84484 ASSAY OF TROPONIN QUANT: CPT

## 2021-03-22 PROCEDURE — 71045 X-RAY EXAM CHEST 1 VIEW: CPT

## 2021-03-22 PROCEDURE — 85730 THROMBOPLASTIN TIME PARTIAL: CPT | Performed by: INTERNAL MEDICINE

## 2021-03-22 PROCEDURE — 93005 ELECTROCARDIOGRAM TRACING: CPT

## 2021-03-22 PROCEDURE — 85730 THROMBOPLASTIN TIME PARTIAL: CPT | Performed by: EMERGENCY MEDICINE

## 2021-03-22 PROCEDURE — 85025 COMPLETE CBC W/AUTO DIFF WBC: CPT | Performed by: EMERGENCY MEDICINE

## 2021-03-22 PROCEDURE — 74177 CT ABD & PELVIS W/CONTRAST: CPT

## 2021-03-22 PROCEDURE — 86301 IMMUNOASSAY TUMOR CA 19-9: CPT | Performed by: INTERNAL MEDICINE

## 2021-03-22 PROCEDURE — 82378 CARCINOEMBRYONIC ANTIGEN: CPT | Performed by: INTERNAL MEDICINE

## 2021-03-22 PROCEDURE — 85025 COMPLETE CBC W/AUTO DIFF WBC: CPT

## 2021-03-22 PROCEDURE — 25010000002 HEPARIN (PORCINE) PER 1000 UNITS: Performed by: EMERGENCY MEDICINE

## 2021-03-22 PROCEDURE — 25010000002 IOPAMIDOL 61 % SOLUTION: Performed by: EMERGENCY MEDICINE

## 2021-03-22 RX ORDER — SODIUM CHLORIDE 0.9 % (FLUSH) 0.9 %
10 SYRINGE (ML) INJECTION AS NEEDED
Status: DISCONTINUED | OUTPATIENT
Start: 2021-03-22 | End: 2021-03-25 | Stop reason: HOSPADM

## 2021-03-22 RX ORDER — ACETAMINOPHEN 160 MG/5ML
650 SOLUTION ORAL EVERY 4 HOURS PRN
Status: DISCONTINUED | OUTPATIENT
Start: 2021-03-22 | End: 2021-03-25 | Stop reason: HOSPADM

## 2021-03-22 RX ORDER — ACETAMINOPHEN 650 MG/1
650 SUPPOSITORY RECTAL EVERY 4 HOURS PRN
Status: DISCONTINUED | OUTPATIENT
Start: 2021-03-22 | End: 2021-03-25 | Stop reason: HOSPADM

## 2021-03-22 RX ORDER — ONDANSETRON 2 MG/ML
4 INJECTION INTRAMUSCULAR; INTRAVENOUS EVERY 6 HOURS PRN
Status: DISCONTINUED | OUTPATIENT
Start: 2021-03-22 | End: 2021-03-25 | Stop reason: HOSPADM

## 2021-03-22 RX ORDER — PRAVASTATIN SODIUM 40 MG
80 TABLET ORAL NIGHTLY
Status: DISCONTINUED | OUTPATIENT
Start: 2021-03-22 | End: 2021-03-25 | Stop reason: HOSPADM

## 2021-03-22 RX ORDER — UREA 10 %
3 LOTION (ML) TOPICAL NIGHTLY PRN
Status: DISCONTINUED | OUTPATIENT
Start: 2021-03-22 | End: 2021-03-25 | Stop reason: HOSPADM

## 2021-03-22 RX ORDER — HEPARIN SODIUM 10000 [USP'U]/100ML
16.5 INJECTION, SOLUTION INTRAVENOUS
Status: DISCONTINUED | OUTPATIENT
Start: 2021-03-22 | End: 2021-03-24

## 2021-03-22 RX ORDER — HEPARIN SODIUM 5000 [USP'U]/ML
80 INJECTION, SOLUTION INTRAVENOUS; SUBCUTANEOUS ONCE
Status: COMPLETED | OUTPATIENT
Start: 2021-03-22 | End: 2021-03-22

## 2021-03-22 RX ORDER — METOPROLOL SUCCINATE 50 MG/1
50 TABLET, EXTENDED RELEASE ORAL DAILY
Status: DISCONTINUED | OUTPATIENT
Start: 2021-03-22 | End: 2021-03-25 | Stop reason: HOSPADM

## 2021-03-22 RX ORDER — TAMSULOSIN HYDROCHLORIDE 0.4 MG/1
0.4 CAPSULE ORAL DAILY
Status: DISCONTINUED | OUTPATIENT
Start: 2021-03-22 | End: 2021-03-25 | Stop reason: HOSPADM

## 2021-03-22 RX ORDER — NALOXONE HCL 0.4 MG/ML
0.4 VIAL (ML) INJECTION
Status: DISCONTINUED | OUTPATIENT
Start: 2021-03-22 | End: 2021-03-25 | Stop reason: HOSPADM

## 2021-03-22 RX ORDER — SODIUM CHLORIDE 9 MG/ML
50 INJECTION, SOLUTION INTRAVENOUS CONTINUOUS
Status: DISCONTINUED | OUTPATIENT
Start: 2021-03-22 | End: 2021-03-23

## 2021-03-22 RX ORDER — ACETAMINOPHEN 325 MG/1
650 TABLET ORAL EVERY 4 HOURS PRN
Status: DISCONTINUED | OUTPATIENT
Start: 2021-03-22 | End: 2021-03-25 | Stop reason: HOSPADM

## 2021-03-22 RX ORDER — HEPARIN SODIUM 5000 [USP'U]/ML
40-80 INJECTION, SOLUTION INTRAVENOUS; SUBCUTANEOUS EVERY 6 HOURS PRN
Status: DISCONTINUED | OUTPATIENT
Start: 2021-03-22 | End: 2021-03-24

## 2021-03-22 RX ORDER — SODIUM CHLORIDE 0.9 % (FLUSH) 0.9 %
10 SYRINGE (ML) INJECTION EVERY 12 HOURS SCHEDULED
Status: DISCONTINUED | OUTPATIENT
Start: 2021-03-22 | End: 2021-03-25 | Stop reason: HOSPADM

## 2021-03-22 RX ORDER — ASPIRIN 81 MG/1
81 TABLET ORAL DAILY
Status: DISCONTINUED | OUTPATIENT
Start: 2021-03-23 | End: 2021-03-25 | Stop reason: HOSPADM

## 2021-03-22 RX ORDER — MORPHINE SULFATE 2 MG/ML
1 INJECTION, SOLUTION INTRAMUSCULAR; INTRAVENOUS EVERY 4 HOURS PRN
Status: DISCONTINUED | OUTPATIENT
Start: 2021-03-22 | End: 2021-03-25 | Stop reason: HOSPADM

## 2021-03-22 RX ADMIN — IOPAMIDOL 85 ML: 612 INJECTION, SOLUTION INTRAVENOUS at 13:00

## 2021-03-22 RX ADMIN — PRAVASTATIN SODIUM 80 MG: 40 TABLET ORAL at 21:40

## 2021-03-22 RX ADMIN — SODIUM CHLORIDE 75 ML/HR: 9 INJECTION, SOLUTION INTRAVENOUS at 18:34

## 2021-03-22 RX ADMIN — SODIUM CHLORIDE, PRESERVATIVE FREE 10 ML: 5 INJECTION INTRAVENOUS at 21:40

## 2021-03-22 RX ADMIN — TAMSULOSIN HYDROCHLORIDE 0.4 MG: 0.4 CAPSULE ORAL at 18:34

## 2021-03-22 RX ADMIN — HEPARIN SODIUM 7300 UNITS: 5000 INJECTION INTRAVENOUS; SUBCUTANEOUS at 15:31

## 2021-03-22 RX ADMIN — HEPARIN SODIUM 16.5 UNITS/KG/HR: 10000 INJECTION, SOLUTION INTRAVENOUS at 15:31

## 2021-03-22 NOTE — ED NOTES
Patient was placed in face mask in first look.  Patient was wearing a face mask throughout our encounter.  I wore protective eye protection throughout the encounter.  Hand hygiene was performed before and after patient encounter.     Patient c/o Abdominal pain x 2 weeks, c/o Nausea and Diarrhea, current pain level is a 5 out of 10.     Ramses Loyola, RN  03/22/21 1106

## 2021-03-22 NOTE — CONSULTS
General Surgery Consult    Summary:    Mr. Timi Wolf is a 75 y.o. year old gentleman with extensive portal vein thrombus and abdominal pain. Afebrile, no tachycardia, WBC normal, not acidotic; no CT findings of compromised bowel; no indication for surgery currently.  Okay for clear liquid diet from surgery standpoint.  On heparin, will likely need hypercoaguable workup, management per primary.  Will continue to follow.    Chief Complaint:    abdominal pain     History of Present Illness:    Mr. Timi Wolf is a 75 y.o. year old gentleman who presents with a 2-week history of abdominal pain that continues to worsen.  No fever or chills.  Some nausea but no vomiting.  Has been having regular bowel function.  No prior history of thrombotic events.    Past Medical History:   • GERD  • HTN  • HLD    Past Surgical History:    • Achilles tendon repair  • Shoulder surgery     Family History:    • No family history of colon cancer    Social History:    • Denies tobacco use  • Denies alcohol use    Allergies:   No Known Allergies    Medications:     Current Facility-Administered Medications:   •  sodium chloride 0.9 % flush 10 mL, 10 mL, Intravenous, PRN, Emergency, Triage Protocol, MD    Current Outpatient Medications:   •  amLODIPine-benazepril (LOTREL 5-20) 5-20 MG per capsule, TAKE 1 CAPSULE DAILY, Disp: 90 capsule, Rfl: 3  •  aspirin 81 MG tablet, Take 81 mg by mouth daily., Disp: , Rfl:   •  Coenzyme Q10 150 MG capsule, Take 1 tablet by mouth daily., Disp: , Rfl:   •  hydroCHLOROthiazide (MICROZIDE) 12.5 MG capsule, TAKE 1 CAPSULE EVERY MORNING, Disp: 90 capsule, Rfl: 1  •  Melatonin 3 MG capsule, Take  by mouth Every Night., Disp: , Rfl:   •  metoprolol succinate XL (TOPROL-XL) 50 MG 24 hr tablet, TAKE 1 TABLET DAILY, Disp: 90 tablet, Rfl: 1  •  pravastatin (PRAVACHOL) 80 MG tablet, TAKE 1 TABLET EVERY NIGHT, Disp: 90 tablet, Rfl: 1  •  tamsulosin (FLOMAX) 0.4 MG capsule 24 hr capsule, TAKE 1 CAPSULE DAILY,  Disp: 90 capsule, Rfl: 3      Radiology/Endoscopy:    • CT scan reviewed; large portal vein thrombus, no signs of ischemic change to the intestine, minimal free fluid in the pelvis    Labs:    • WBC 5 Hgb 14.1 platelets 173 Cr 1.36 CO2 22    Review of Systems:   Influenza-like illness: no fever, no  cough, no  sore throat, no  body aches, no loss of sense of taste or smell, no known exposure to person with Covid-19.  Constitutional: Negative for fevers or chills  HENT: Negative for hearing loss or runny nose  Eyes: Negative for vision changes or scleral icterus  Respiratory: Negative for cough or shortness of breath  Cardiovascular: Negative for chest pain or heart palpitations  Gastrointestinal: + for abdominal pain; Negative for nausea, vomiting, constipation, melena, or hematochezia  Genitourinary: Negative for hematuria or dysuria  Musculoskeletal: Negative for joint swelling or gait instability  Neurologic: Negative for tremors or seizures  Psychiatric: Negative for suicidal ideations or depression  All other systems reviewed and negative    Physical Exam:   • Constitutional: Well-developed, well-nourished, no acute distress  • Vital signs: T 96.7 HR 80 RR 16 /77  • Eyes: Conjunctiva normal, sclera nonicteric  • ENMT: Hearing grossly normal, oral mucosa moist  • Neck: Supple, trachea midline  • Respiratory: On room air, no increased work of breathing, normal inspiratory effort  • Cardiovascular: Regular rate, no peripheral edema, no jugular venous distention  • Gastrointestinal: Soft, nondistended, nontender  • Skin:  Warm, dry, no rash on visualized skin surfaces  • Musculoskeletal: Symmetric strength, normal gait  • Psychiatric: Alert and oriented ×3, normal affect     BAMBI FELIPE M.D.  General and Endoscopic Surgery  [facility]    [address], [address]  [place], [place], [address]  P: [phone]  F: [phone]

## 2021-03-22 NOTE — NURSING NOTE
Pt rcvd.. hep gtt @15ml/hr. Pt denies pain. A/o, up with no assistance. No s/s of distress noted. Will continue to monitor and support as needed.   Patient was seen and assessed while wearing personal protective equipment including facemask, protective eyewear and gloves.  Hand hygiene performed prior to entering the room and upon exiting with doffing of gloves.

## 2021-03-22 NOTE — PLAN OF CARE
Goal Outcome Evaluation:  Plan of Care Reviewed With: patient     Outcome Summary: rcvd pt this evening from ER with no c.o pain. heparin gtt infusing, pt up ad francoise and tolerating clears.  No s/s of distress noted. Will continue to monitor and support as needed.   Patient was seen and assessed while wearing personal protective equipment including facemask, protective eyewear and gloves.  Hand hygiene performed prior to entering the room and upon exiting with doffing of gloves.

## 2021-03-22 NOTE — PROGRESS NOTES
Clinical Pharmacy Services: Medication History    Timi Wolf is a 75 y.o. male presenting to [facility] for   Chief Complaint   Patient presents with   • Abdominal Pain     Patient c/o Abdominal pain x 2 weeks, c/o Nausea and Diarrhea, current pain level is a 5 out of 10.       He  has a past medical history of Abdominal pain, Arteriosclerotic cardiovascular disease, Diverticulosis, Dysphagia, GERD (gastroesophageal reflux disease), Hyperlipidemia, Hypertension, Internal hemorrhoids, Prostate enlargement, and Renal cyst, right.    Allergies as of 03/22/2021 - Reviewed 03/22/2021   Allergen Reaction Noted   • Sulfamethoxazole-trimethoprim Itching 01/26/2020       Medication information was obtained from: patient  Pharmacy and Phone Number:     Prior to Admission Medications     Prescriptions Last Dose Informant Patient Reported? Taking?    amLODIPine-benazepril (LOTREL 5-20) 5-20 MG per capsule 3/22/2021 Self No Yes    TAKE 1 CAPSULE DAILY    Patient taking differently:  Take 1 capsule by mouth Daily.    aspirin 81 MG tablet 3/22/2021 Self Yes Yes    Take 81 mg by mouth daily.    Coenzyme Q10 150 MG capsule 3/21/2021 Self Yes Yes    Take 1 tablet by mouth Every Night.    hydroCHLOROthiazide (MICROZIDE) 12.5 MG capsule 3/22/2021 Self No Yes    TAKE 1 CAPSULE EVERY MORNING    Patient taking differently:  Take 12.5 mg by mouth Every Morning.    Melatonin 3 MG capsule 3/21/2021 Self Yes Yes    Take 1 capsule by mouth Every Night.    metoprolol succinate XL (TOPROL-XL) 50 MG 24 hr tablet 3/22/2021 Self No Yes    TAKE 1 TABLET DAILY    Patient taking differently:  Take 50 mg by mouth Daily.    pravastatin (PRAVACHOL) 80 MG tablet 3/21/2021 Self No Yes    TAKE 1 TABLET EVERY NIGHT    Patient taking differently:  Take 80 mg by mouth Every Night.    tamsulosin (FLOMAX) 0.4 MG capsule 24 hr capsule 3/21/2021 Self No Yes    TAKE 1 CAPSULE DAILY    Patient taking differently:  Take 1 capsule by mouth Daily.             Medication notes:     This medication list is complete to the best of my knowledge as of 3/22/2021    Please call if questions.    [facility]  Medication History Technician  [phone]    3/22/2021 15:48 EDT

## 2021-03-22 NOTE — ED PROVIDER NOTES
EMERGENCY DEPARTMENT ENCOUNTER    Room Number:  39/39  PCP: Stanley Ruiz Jr., MD  Historian: Patient  History Limited By: Nothing      HPI  Chief Complaint: Abdominal pain  Context: Timi Wolf is a 75 y.o. male who presents to the ED c/o abdominal pain.  Patient states pain started about 2 weeks ago.  Patient states pain is mostly upper abdominal pain.  Has had no vomiting.  Has had loose stool but no tete diarrhea.  Patient does state food makes it worse.  Has had no prior abdominal surgeries.  Patient states was seen in urgent care and they have him Protonix.  Patient has had no dysuria or hematuria.  No chest pain.      Location: Epigastric abdominal pain  Radiation: None   Character: Aching  Duration: 2 weeks  Severity: Moderate  Progression: Waxes and wanes  Aggravating Factors: Food   Alleviating Factors: Nothing        MEDICAL RECORD REVIEW    Patient seen in urgent care on March 18          PAST MEDICAL HISTORY  Active Ambulatory Problems     Diagnosis Date Noted   • Chronic coronary artery disease 06/18/2016   • Hyperlipidemia 06/18/2016   • Hypertension 06/18/2016   • Interstitial cystitis (chronic) without hematuria 01/09/2017   • Prostate enlargement 01/09/2017     Resolved Ambulatory Problems     Diagnosis Date Noted   • No Resolved Ambulatory Problems     Past Medical History:   Diagnosis Date   • Abdominal pain    • Arteriosclerotic cardiovascular disease    • Diverticulosis    • Dysphagia    • GERD (gastroesophageal reflux disease)    • Internal hemorrhoids    • Renal cyst, right          PAST SURGICAL HISTORY  Past Surgical History:   Procedure Laterality Date   • ACHILLES TENDON REPAIR      ruptured tendon   • COLONOSCOPY N/A 2012    Dr Alvarado/[facility]   • SHOULDER SURGERY           FAMILY HISTORY  Family History   Problem Relation Age of Onset   • Heart attack Father    • Heart disease Father          SOCIAL HISTORY  Social History     Socioeconomic History   • Marital status:      Spouse  name: Not on file   • Number of children: Not on file   • Years of education: Not on file   • Highest education level: Not on file   Tobacco Use   • Smoking status: Former Smoker   • Smokeless tobacco: Never Used   Substance and Sexual Activity   • Alcohol use: No   • Drug use: No   • Sexual activity: Defer         ALLERGIES  Sulfamethoxazole-trimethoprim        REVIEW OF SYSTEMS  Review of Systems   Constitutional: Negative for activity change, appetite change and fever.   HENT: Negative for congestion and sore throat.    Eyes: Negative.    Respiratory: Negative for cough and shortness of breath.    Cardiovascular: Negative for chest pain and leg swelling.   Gastrointestinal: Positive for abdominal pain. Negative for diarrhea and vomiting.   Endocrine: Negative.    Genitourinary: Negative for decreased urine volume and dysuria.   Musculoskeletal: Negative for neck pain.   Skin: Negative for rash and wound.   Allergic/Immunologic: Negative.    Neurological: Negative for weakness, numbness and headaches.   Hematological: Negative.    Psychiatric/Behavioral: Negative.    All other systems reviewed and are negative.           PHYSICAL EXAM  ED Triage Vitals [03/22/21 1101]   Temp Heart Rate Resp BP SpO2   96.7 °F (35.9 °C) 80 16 126/77 97 %      Temp src Heart Rate Source Patient Position BP Location FiO2 (%)   Tympanic Monitor Standing Left arm --       Physical Exam  Vitals and nursing note reviewed.   Constitutional:       General: He is not in acute distress.  HENT:      Head: Normocephalic and atraumatic.   Eyes:      Pupils: Pupils are equal, round, and reactive to light.   Cardiovascular:      Rate and Rhythm: Normal rate and regular rhythm.      Heart sounds: Normal heart sounds.   Pulmonary:      Effort: Pulmonary effort is normal. No respiratory distress.      Breath sounds: Normal breath sounds.   Abdominal:      Palpations: Abdomen is soft.      Tenderness: There is abdominal tenderness. There is no  guarding or rebound.      Comments: Patient has mild epigastric tenderness   Musculoskeletal:         General: Normal range of motion.      Cervical back: Normal range of motion and neck supple.   Skin:     General: Skin is warm and dry.   Neurological:      Mental Status: He is alert and oriented to person, place, and time.      Sensory: Sensation is intact.   Psychiatric:         Mood and Affect: Mood and affect normal.     Patient was wearing a face mask when I entered the room and they continued to wear a mask throughout their stay in the ED.  I wore PPE, including gloves, face mask with shield or face mask with goggles whenever I was in the room with patient.         LAB RESULTS  Recent Results (from the past 24 hour(s))   Comprehensive Metabolic Panel    Collection Time: 03/22/21 11:43 AM    Specimen: Blood   Result Value Ref Range    Glucose 110 (H) 65 - 99 mg/dL    BUN 26 (H) 8 - 23 mg/dL    Creatinine 1.36 (H) 0.76 - 1.27 mg/dL    Sodium 137 136 - 145 mmol/L    Potassium 3.8 3.5 - 5.2 mmol/L    Chloride 106 98 - 107 mmol/L    CO2 22.5 22.0 - 29.0 mmol/L    Calcium 9.5 8.6 - 10.5 mg/dL    Total Protein 6.6 6.0 - 8.5 g/dL    Albumin 3.60 3.50 - 5.20 g/dL    ALT (SGPT) 17 1 - 41 U/L    AST (SGOT) 17 1 - 40 U/L    Alkaline Phosphatase 94 39 - 117 U/L    Total Bilirubin 0.5 0.0 - 1.2 mg/dL    eGFR Non African Amer 51 (L) >60 mL/min/1.73    Globulin 3.0 gm/dL    A/G Ratio 1.2 g/dL    BUN/Creatinine Ratio 19.1 7.0 - 25.0    Anion Gap 8.5 5.0 - 15.0 mmol/L   Lipase    Collection Time: 03/22/21 11:43 AM    Specimen: Blood   Result Value Ref Range    Lipase 51 13 - 60 U/L   Troponin    Collection Time: 03/22/21 11:43 AM    Specimen: Blood   Result Value Ref Range    Troponin T <0.010 0.000 - 0.030 ng/mL   Light Blue Top    Collection Time: 03/22/21 11:43 AM   Result Value Ref Range    Extra Tube hold for add-on    Green Top (Gel)    Collection Time: 03/22/21 11:43 AM   Result Value Ref Range    Extra Tube Hold for  add-ons.    Lavender Top    Collection Time: 03/22/21 11:43 AM   Result Value Ref Range    Extra Tube hold for add-on    Gold Top - SST    Collection Time: 03/22/21 11:43 AM   Result Value Ref Range    Extra Tube Hold for add-ons.    CBC Auto Differential    Collection Time: 03/22/21 11:43 AM    Specimen: Blood   Result Value Ref Range    WBC 5.56 3.40 - 10.80 10*3/mm3    RBC 4.70 4.14 - 5.80 10*6/mm3    Hemoglobin 14.1 13.0 - 17.7 g/dL    Hematocrit 41.8 37.5 - 51.0 %    MCV 88.9 79.0 - 97.0 fL    MCH 30.0 26.6 - 33.0 pg    MCHC 33.7 31.5 - 35.7 g/dL    RDW 12.1 (L) 12.3 - 15.4 %    RDW-SD 39.1 37.0 - 54.0 fl    MPV 9.4 6.0 - 12.0 fL    Platelets 173 140 - 450 10*3/mm3    Neutrophil % 56.8 42.7 - 76.0 %    Lymphocyte % 24.5 19.6 - 45.3 %    Monocyte % 12.6 (H) 5.0 - 12.0 %    Eosinophil % 4.7 0.3 - 6.2 %    Basophil % 0.9 0.0 - 1.5 %    Immature Grans % 0.5 0.0 - 0.5 %    Neutrophils, Absolute 3.16 1.70 - 7.00 10*3/mm3    Lymphocytes, Absolute 1.36 0.70 - 3.10 10*3/mm3    Monocytes, Absolute 0.70 0.10 - 0.90 10*3/mm3    Eosinophils, Absolute 0.26 0.00 - 0.40 10*3/mm3    Basophils, Absolute 0.05 0.00 - 0.20 10*3/mm3    Immature Grans, Absolute 0.03 0.00 - 0.05 10*3/mm3    nRBC 0.0 0.0 - 0.2 /100 WBC   Urinalysis With Microscopic If Indicated (No Culture) - Urine, Clean Catch    Collection Time: 03/22/21 11:49 AM    Specimen: Urine, Clean Catch   Result Value Ref Range    Color, UA Yellow Yellow, Straw    Appearance, UA Clear Clear    pH, UA 5.5 5.0 - 8.0    Specific Gravity, UA 1.024 1.005 - 1.030    Glucose, UA Negative Negative    Ketones, UA Negative Negative    Bilirubin, UA Negative Negative    Blood, UA Negative Negative    Protein, UA Trace (A) Negative    Leuk Esterase, UA Negative Negative    Nitrite, UA Negative Negative    Urobilinogen, UA 0.2 E.U./dL 0.2 - 1.0 E.U./dL   ECG 12 Lead    Collection Time: 03/22/21 12:13 PM   Result Value Ref Range    QT Interval 421 ms   Protime-INR    Collection Time:  03/22/21  3:28 PM    Specimen: Blood   Result Value Ref Range    Protime 13.7 11.7 - 14.2 Seconds    INR 1.07 0.90 - 1.10   aPTT    Collection Time: 03/22/21  3:28 PM    Specimen: Blood   Result Value Ref Range    PTT 47.2 (H) 22.7 - 35.4 seconds   CBC Auto Differential    Collection Time: 03/22/21  3:28 PM    Specimen: Blood   Result Value Ref Range    WBC 5.47 3.40 - 10.80 10*3/mm3    RBC 4.76 4.14 - 5.80 10*6/mm3    Hemoglobin 14.8 13.0 - 17.7 g/dL    Hematocrit 42.0 37.5 - 51.0 %    MCV 88.2 79.0 - 97.0 fL    MCH 31.1 26.6 - 33.0 pg    MCHC 35.2 31.5 - 35.7 g/dL    RDW 12.0 (L) 12.3 - 15.4 %    RDW-SD 39.0 37.0 - 54.0 fl    MPV 10.0 6.0 - 12.0 fL    Platelets 182 140 - 450 10*3/mm3    Neutrophil % 51.1 42.7 - 76.0 %    Lymphocyte % 28.9 19.6 - 45.3 %    Monocyte % 13.0 (H) 5.0 - 12.0 %    Eosinophil % 5.9 0.3 - 6.2 %    Basophil % 0.9 0.0 - 1.5 %    Immature Grans % 0.2 0.0 - 0.5 %    Neutrophils, Absolute 2.80 1.70 - 7.00 10*3/mm3    Lymphocytes, Absolute 1.58 0.70 - 3.10 10*3/mm3    Monocytes, Absolute 0.71 0.10 - 0.90 10*3/mm3    Eosinophils, Absolute 0.32 0.00 - 0.40 10*3/mm3    Basophils, Absolute 0.05 0.00 - 0.20 10*3/mm3    Immature Grans, Absolute 0.01 0.00 - 0.05 10*3/mm3    nRBC 0.0 0.0 - 0.2 /100 WBC       Ordered the above labs and reviewed the results.        RADIOLOGY  CT Abdomen Pelvis With Contrast   Final Result       1.  Extensive thrombus involving the majority of the portal venous   system, as detailed above. No dilated or focally thickened   hyperenhancing loops of bowel and colon are identified. There is some   fecalization of small bowel loops, which can reflect slow transit, with   no definite evidence of pneumatosis.   2.  Patchy attenuation of the liver is likely perfusional. There may be   component of geographic steatosis. Additionally, given the extent of   portal vein thrombus, developing hepatic infarction cannot be excluded.   Attention on follow-up imaging is recommended.        Discussed with Dr. Muniz at 1:28 PM.       This report was finalized on 3/22/2021 1:32 PM by Dr. Judith Engel M.D.          XR Chest 1 View   Final Result   No focal pulmonary consolidation. Borderline heart size.   Tortuous aorta. Follow-up as clinically indicated.       This report was finalized on 3/22/2021 12:44 PM by Dr. Bk Cortes M.D.               Ordered the above noted radiological studies. Reviewed by me in PACS.  Discussed with Dr. Engel (radiologist) regarding CT/MRI scan results.          PROCEDURES  Procedures      EKG:          EKG time: 1213  Rhythm/Rate: Normal sinus rhythm 66 occasional PVC  P waves and OK: Normal P waves  QRS, axis: Normal QRS  ST and T waves: Flattened T waves inferiorly    Interpreted Contemporaneously by me, independently viewed  No prior        MEDICATIONS GIVEN IN ER  Medications   sodium chloride 0.9 % flush 10 mL (has no administration in time range)   heparin 60997 units/250 mL (100 units/mL) in 0.45 % NaCl infusion (16.5 Units/kg/hr × 90.7 kg Intravenous New Bag 3/22/21 1531)   heparin (porcine) 5000 UNIT/ML injection 3,600-7,300 Units (has no administration in time range)   iopamidol (ISOVUE-300) 61 % injection 100 mL (85 mL Intravenous Given by Other 3/22/21 1300)   heparin (porcine) 5000 UNIT/ML injection 7,300 Units (7,300 Units Intravenous Given 3/22/21 1531)             PROGRESS AND CONSULTS  ED Course as of Mar 22 1619   Mon Mar 22, 2021   1514 15:14 EDT  Patient presents for increasing abdominal pain for 2 weeks and appears to have portal venous thrombosis.  Patient has been discussed with Dr. Omer and then Dr. Alonso and will be admitted.  Heparin drip.    [SL]      ED Course User Index  [SL] Geovanny Muniz MD           MEDICAL DECISION MAKING      MDM  Number of Diagnoses or Management Options     Amount and/or Complexity of Data Reviewed  Clinical lab tests: reviewed and ordered (White blood cell count 5)  Tests in the radiology section  of CPT®: reviewed and ordered (Patient has extensive portal venous thrombosis)  Discuss the patient with other providers: yes (Discussed with Dr. Omer and then Dr. Alonso and admit.  IV heparin)               DIAGNOSIS  Final diagnoses:   Epigastric pain   Portal vein thrombosis           DISPOSITION      admit    Latest Documented Vital Signs:  As of 16:19 EDT  BP- 138/97 HR- 71 Temp- 96.7 °F (35.9 °C) (Tympanic) O2 sat- 96%                         Geovanny Muniz MD  03/22/21 6515

## 2021-03-22 NOTE — H&P
Internal medicine history and physical  INTERNAL MEDICINE   [facility]       Patient Identification:  Name: Timi Wolf  Age: 75 y.o.  Sex: male  :  1945  MRN: [id number]                   Primary Care Physician: Stanley Ruiz Jr., MD                               Date of admission:3/22/2021    Chief Complaint: Upper abdominal discomfort off and on for the last couple of weeks.    History of Present Illness:   Patient is a 75-year-old male with past medical history as noted below was in his usual state of his health until couple weeks ago when he started having burning discomfort in the upper abdominal area that usually last for about 10 minutes and goes away.  He denies any significant association of these discomforts with meals or activity.  He had these episodes on very low frequency off and on for quite some time but not enough to hamper him until 2 weeks ago when they become constant and frequent.  Patient has associated sense of bloating being bloated and get full easily.  Patient did not have any other symptoms such as altered bowel habits hematemesis or melena.  Patient denies any weight loss or change in appetite but physically could not eat because of the bloated feeling.  He initially went to the [facility] care center and from there he was sent to the emergency room for further evaluation.      Past Medical History:  Past Medical History:   Diagnosis Date   • Abdominal pain    • Arteriosclerotic cardiovascular disease    • Diverticulosis    • Dysphagia    • GERD (gastroesophageal reflux disease)    • Hyperlipidemia    • Hypertension    • Internal hemorrhoids    • Prostate enlargement    • Renal cyst, right      Past Surgical History:  Past Surgical History:   Procedure Laterality Date   • ACHILLES TENDON REPAIR      ruptured tendon   • COLONOSCOPY N/A     Dr Alvarado/[facility]   • SHOULDER SURGERY        Home Meds:  Medications Prior to Admission   Medication Sig Dispense Refill  Last Dose   • amLODIPine-benazepril (LOTREL 5-20) 5-20 MG per capsule TAKE 1 CAPSULE DAILY (Patient taking differently: Take 1 capsule by mouth Daily.) 90 capsule 3 3/22/2021 at 0800   • aspirin 81 MG tablet Take 81 mg by mouth daily.   3/22/2021 at 0800   • Coenzyme Q10 150 MG capsule Take 1 tablet by mouth Every Night.   3/21/2021 at 2200   • hydroCHLOROthiazide (MICROZIDE) 12.5 MG capsule TAKE 1 CAPSULE EVERY MORNING (Patient taking differently: Take 12.5 mg by mouth Every Morning.) 90 capsule 1 3/22/2021 at 0800   • Melatonin 3 MG capsule Take 1 capsule by mouth Every Night.   3/21/2021 at 2200   • metoprolol succinate XL (TOPROL-XL) 50 MG 24 hr tablet TAKE 1 TABLET DAILY (Patient taking differently: Take 50 mg by mouth Daily.) 90 tablet 1 3/22/2021 at 0800   • pravastatin (PRAVACHOL) 80 MG tablet TAKE 1 TABLET EVERY NIGHT (Patient taking differently: Take 80 mg by mouth Every Night.) 90 tablet 1 3/21/2021 at 2200   • tamsulosin (FLOMAX) 0.4 MG capsule 24 hr capsule TAKE 1 CAPSULE DAILY (Patient taking differently: Take 1 capsule by mouth Daily.) 90 capsule 3 3/21/2021 at 2200     Current Meds:     Current Facility-Administered Medications:   •  heparin (porcine) 5000 UNIT/ML injection 3,600-7,300 Units, 40-80 Units/kg, Intravenous, Q6H PRN, Geovanny Muniz MD  •  heparin 59553 units/250 mL (100 units/mL) in 0.45 % NaCl infusion, 16.5 Units/kg/hr, Intravenous, Titrated, Geovanny Muniz MD, Last Rate: 14.96 mL/hr at 03/22/21 1531, 16.5 Units/kg/hr at 03/22/21 1531  •  sodium chloride 0.9 % flush 10 mL, 10 mL, Intravenous, PRN, Emergency, Triage Protocol, MD  Allergies:  Allergies   Allergen Reactions   • Sulfamethoxazole-Trimethoprim Itching     Abdominal pain as well      Social History:   Social History     Tobacco Use   • Smoking status: Former Smoker   • Smokeless tobacco: Never Used   Substance Use Topics   • Alcohol use: No      Family History:  Family History   Problem Relation Age of Onset   • Heart  "attack Father    • Heart disease Father           Review of Systems  See history of present illness and past medical history.  Constitutional: Negative for activity change, appetite change and fever.   HENT: Negative for congestion and sore throat.    Eyes: Negative.    Respiratory: Negative for cough and shortness of breath.    Cardiovascular: Negative for chest pain and leg swelling.   Gastrointestinal: Positive for abdominal pain.  Feels bloated denies any nausea vomiting negative for diarrhea and vomiting.   Endocrine: Negative.    Genitourinary: Negative for decreased urine volume and dysuria.   Musculoskeletal: Negative for neck pain.   Skin: Negative for rash and wound.   Allergic/Immunologic: Negative.    Neurological: Negative for weakness, numbness and headaches.   Hematological: Negative.    Psychiatric/Behavioral: Negative.    All other systems reviewed and are negative.    Vitals:   /84 (Patient Position: Lying)   Pulse 70   Temp 97.1 °F (36.2 °C) (Oral)   Resp 16   Ht 180.3 cm (71\")   Wt 90.7 kg (200 lb)   SpO2 96%   BMI 27.89 kg/m²   I/O: No intake or output data in the 24 hours ending 03/22/21 9740  Exam:  Patient is examined using the personal protective equipment as per guidelines from infection control for this particular patient as enacted.  Hand washing was performed before and after patient interaction.  General Appearance:    Alert, cooperative, no distress, appears stated age   Head:    Normocephalic, without obvious abnormality, atraumatic   Eyes:    PERRL, conjunctiva/corneas clear, EOM's intact, both eyes   Ears:    Normal external ear canals, both ears   Nose:   Nares normal, septum midline, mucosa normal, no drainage    or sinus tenderness   Throat:   Lips, tongue, gums normal; oral mucosa pink and moist   Neck:   Supple, symmetrical, trachea midline, no adenopathy;     thyroid:  no enlargement/tenderness/nodules; no carotid    bruit or JVD   Back:     Symmetric, no " curvature, ROM normal, no CVA tenderness   Lungs:     Clear to auscultation bilaterally, respirations unlabored   Chest Wall:    No tenderness or deformity    Heart:    Regular rate and rhythm, S1 and S2 normal, no murmur, rub   or gallop   Abdomen:    Soft mild epigastric tenderness no specific organomegaly detected   Extremities:   Extremities normal, atraumatic, no cyanosis or edema   Pulses:   Pulses palpable in all extremities; symmetric all extremities   Skin:   Skin color normal, Skin is warm and dry,  no rashes or palpable lesions   Neurologic:  Grossly nonfocal       Data Review:      I reviewed the patient's new clinical results.  Results from last 7 days   Lab Units 03/22/21  1528 03/22/21  1143   WBC 10*3/mm3 5.47 5.56   HEMOGLOBIN g/dL 14.8 14.1   PLATELETS 10*3/mm3 182 173     Results from last 7 days   Lab Units 03/22/21  1143   SODIUM mmol/L 137   POTASSIUM mmol/L 3.8   CHLORIDE mmol/L 106   CO2 mmol/L 22.5   BUN mg/dL 26*   CREATININE mg/dL 1.36*   CALCIUM mg/dL 9.5   GLUCOSE mg/dL 110*     CT Abdomen Pelvis With Contrast    Result Date: 3/22/2021   1.  Extensive thrombus involving the majority of the portal venous system, as detailed above. No dilated or focally thickened hyperenhancing loops of bowel and colon are identified. There is some fecalization of small bowel loops, which can reflect slow transit, with no definite evidence of pneumatosis. 2.  Patchy attenuation of the liver is likely perfusional. There may be component of geographic steatosis. Additionally, given the extent of portal vein thrombus, developing hepatic infarction cannot be excluded. Attention on follow-up imaging is recommended.  Discussed with Dr. Muniz at 1:28 PM.  This report was finalized on 3/22/2021 1:32 PM by Dr. Judith Engel M.D.      XR Chest 1 View    Result Date: 3/22/2021  No focal pulmonary consolidation. Borderline heart size. Tortuous aorta. Follow-up as clinically indicated.  This report was finalized on  3/22/2021 12:44 PM by Dr. Bk Cortes M.D.        Assessment:  Active Hospital Problems    Diagnosis  POA   • **Epigastric pain [R10.13]  Yes   • Portal vein thrombosis [I81]  Unknown   • Hypercoagulable state (CMS/HCC) [D68.59]  Unknown   • Hypertension [I10]  Yes   • Chronic coronary artery disease [I25.10]  Yes   ckd      Medical decision making:  Epigastric pain with work-up revealing portal vein thrombosis-etiology unclear could range from primary liver disease from her underlying malignancy or evolving hypercoagulable state of autoimmune type.  Plan is to admit the patient provide him with heparin GI consultation and keep him n.p.o. until seen by GI service.  Dehydration with acute kidney injury-provide with IV fluids avoid nephrotoxic agents and hypotensive episodes.  Hypertension-continue antihypertensive regimen avoid hypotensive episodes.  Coronary artery disease-continue present regimen and monitor.  Acute kidney injury on chronic kidney disease-monitor renal function, IV hydration and nephrotoxic agents and hypotensive episodes.  Luis M Alonso MD   3/22/2021  17:19 EDT  Much of this encounter note is an electronic transcription/translation of spoken language to printed text. The electronic translation of spoken language may permit erroneous, or at times, nonsensical words or phrases to be inadvertently transcribed; Although I have reviewed the note for such errors, some may still exist

## 2021-03-23 DIAGNOSIS — N40.0 BENIGN PROSTATIC HYPERPLASIA, UNSPECIFIED WHETHER LOWER URINARY TRACT SYMPTOMS PRESENT: ICD-10-CM

## 2021-03-23 DIAGNOSIS — I81 PORTAL VEIN THROMBOSIS: ICD-10-CM

## 2021-03-23 DIAGNOSIS — D68.59 HYPERCOAGULABLE STATE (HCC): Primary | ICD-10-CM

## 2021-03-23 DIAGNOSIS — N40.0 PROSTATE ENLARGEMENT: ICD-10-CM

## 2021-03-23 LAB
ALBUMIN SERPL-MCNC: 3.1 G/DL (ref 3.5–5.2)
ALBUMIN/GLOB SERPL: 1.3 G/DL
ALP SERPL-CCNC: 81 U/L (ref 39–117)
ALT SERPL W P-5'-P-CCNC: 17 U/L (ref 1–41)
ANION GAP SERPL CALCULATED.3IONS-SCNC: 6.8 MMOL/L (ref 5–15)
APTT PPP: 105.4 SECONDS (ref 22.7–35.4)
APTT PPP: 112 SECONDS (ref 22.7–35.4)
APTT PPP: 192.7 SECONDS (ref 22.7–35.4)
APTT PPP: 75.9 SECONDS (ref 22.7–35.4)
AST SERPL-CCNC: 17 U/L (ref 1–40)
BASOPHILS # BLD MANUAL: 0.12 10*3/MM3 (ref 0–0.2)
BASOPHILS NFR BLD AUTO: 2 % (ref 0–1.5)
BILIRUB SERPL-MCNC: 0.6 MG/DL (ref 0–1.2)
BUN SERPL-MCNC: 22 MG/DL (ref 8–23)
BUN/CREAT SERPL: 19.1 (ref 7–25)
BURR CELLS BLD QL SMEAR: ABNORMAL
CALCIUM SPEC-SCNC: 8.6 MG/DL (ref 8.6–10.5)
CHLORIDE SERPL-SCNC: 109 MMOL/L (ref 98–107)
CO2 SERPL-SCNC: 22.2 MMOL/L (ref 22–29)
CREAT SERPL-MCNC: 1.15 MG/DL (ref 0.76–1.27)
CRP SERPL-MCNC: 2.35 MG/DL (ref 0–0.5)
DEPRECATED RDW RBC AUTO: 37.9 FL (ref 37–54)
EOSINOPHIL # BLD MANUAL: 0.24 10*3/MM3 (ref 0–0.4)
EOSINOPHIL NFR BLD MANUAL: 4 % (ref 0.3–6.2)
ERYTHROCYTE [DISTWIDTH] IN BLOOD BY AUTOMATED COUNT: 11.9 % (ref 12.3–15.4)
ERYTHROCYTE [SEDIMENTATION RATE] IN BLOOD: 11 MM/HR (ref 0–20)
FIBRINOGEN PPP-MCNC: 479 MG/DL (ref 219–464)
GFR SERPL CREATININE-BSD FRML MDRD: 62 ML/MIN/1.73
GLOBULIN UR ELPH-MCNC: 2.4 GM/DL
GLUCOSE SERPL-MCNC: 83 MG/DL (ref 65–99)
HCT VFR BLD AUTO: 37 % (ref 37.5–51)
HGB BLD-MCNC: 13.3 G/DL (ref 13–17.7)
LYMPHOCYTES # BLD MANUAL: 1.7 10*3/MM3 (ref 0.7–3.1)
LYMPHOCYTES NFR BLD MANUAL: 28.3 % (ref 19.6–45.3)
LYMPHOCYTES NFR BLD MANUAL: 7.1 % (ref 5–12)
MCH RBC QN AUTO: 31.3 PG (ref 26.6–33)
MCHC RBC AUTO-ENTMCNC: 35.9 G/DL (ref 31.5–35.7)
MCV RBC AUTO: 87.1 FL (ref 79–97)
MONOCYTES # BLD AUTO: 0.43 10*3/MM3 (ref 0.1–0.9)
NEUTROPHILS # BLD AUTO: 3.53 10*3/MM3 (ref 1.7–7)
NEUTROPHILS NFR BLD MANUAL: 58.6 % (ref 42.7–76)
PLAT MORPH BLD: NORMAL
PLATELET # BLD AUTO: 160 10*3/MM3 (ref 140–450)
PMV BLD AUTO: 9.7 FL (ref 6–12)
POIKILOCYTOSIS BLD QL SMEAR: ABNORMAL
POTASSIUM SERPL-SCNC: 3.7 MMOL/L (ref 3.5–5.2)
PROT SERPL-MCNC: 5.5 G/DL (ref 6–8.5)
RBC # BLD AUTO: 4.25 10*6/MM3 (ref 4.14–5.8)
SODIUM SERPL-SCNC: 138 MMOL/L (ref 136–145)
WBC # BLD AUTO: 6.02 10*3/MM3 (ref 3.4–10.8)
WBC MORPH BLD: NORMAL

## 2021-03-23 PROCEDURE — 85613 RUSSELL VIPER VENOM DILUTED: CPT | Performed by: INTERNAL MEDICINE

## 2021-03-23 PROCEDURE — 84165 PROTEIN E-PHORESIS SERUM: CPT | Performed by: INTERNAL MEDICINE

## 2021-03-23 PROCEDURE — 85732 THROMBOPLASTIN TIME PARTIAL: CPT | Performed by: INTERNAL MEDICINE

## 2021-03-23 PROCEDURE — 81240 F2 GENE: CPT | Performed by: INTERNAL MEDICINE

## 2021-03-23 PROCEDURE — 80053 COMPREHEN METABOLIC PANEL: CPT | Performed by: INTERNAL MEDICINE

## 2021-03-23 PROCEDURE — 85007 BL SMEAR W/DIFF WBC COUNT: CPT | Performed by: INTERNAL MEDICINE

## 2021-03-23 PROCEDURE — 85730 THROMBOPLASTIN TIME PARTIAL: CPT | Performed by: HOSPITALIST

## 2021-03-23 PROCEDURE — 85730 THROMBOPLASTIN TIME PARTIAL: CPT | Performed by: INTERNAL MEDICINE

## 2021-03-23 PROCEDURE — 99223 1ST HOSP IP/OBS HIGH 75: CPT | Performed by: INTERNAL MEDICINE

## 2021-03-23 PROCEDURE — 99232 SBSQ HOSP IP/OBS MODERATE 35: CPT | Performed by: STUDENT IN AN ORGANIZED HEALTH CARE EDUCATION/TRAINING PROGRAM

## 2021-03-23 PROCEDURE — 85598 HEXAGNAL PHOSPH PLTLT NEUTRL: CPT | Performed by: INTERNAL MEDICINE

## 2021-03-23 PROCEDURE — 25010000002 HEPARIN (PORCINE) PER 1000 UNITS: Performed by: INTERNAL MEDICINE

## 2021-03-23 PROCEDURE — 86146 BETA-2 GLYCOPROTEIN ANTIBODY: CPT | Performed by: INTERNAL MEDICINE

## 2021-03-23 PROCEDURE — 85306 CLOT INHIBIT PROT S FREE: CPT | Performed by: INTERNAL MEDICINE

## 2021-03-23 PROCEDURE — 85300 ANTITHROMBIN III ACTIVITY: CPT | Performed by: INTERNAL MEDICINE

## 2021-03-23 PROCEDURE — 85384 FIBRINOGEN ACTIVITY: CPT | Performed by: INTERNAL MEDICINE

## 2021-03-23 PROCEDURE — 83883 ASSAY NEPHELOMETRY NOT SPEC: CPT | Performed by: INTERNAL MEDICINE

## 2021-03-23 PROCEDURE — 85025 COMPLETE CBC W/AUTO DIFF WBC: CPT | Performed by: INTERNAL MEDICINE

## 2021-03-23 PROCEDURE — 85730 THROMBOPLASTIN TIME PARTIAL: CPT | Performed by: STUDENT IN AN ORGANIZED HEALTH CARE EDUCATION/TRAINING PROGRAM

## 2021-03-23 PROCEDURE — 86140 C-REACTIVE PROTEIN: CPT | Performed by: INTERNAL MEDICINE

## 2021-03-23 PROCEDURE — 85705 THROMBOPLASTIN INHIBITION: CPT | Performed by: INTERNAL MEDICINE

## 2021-03-23 PROCEDURE — 85303 CLOT INHIBIT PROT C ACTIVITY: CPT | Performed by: INTERNAL MEDICINE

## 2021-03-23 PROCEDURE — 85670 THROMBIN TIME PLASMA: CPT | Performed by: INTERNAL MEDICINE

## 2021-03-23 PROCEDURE — 81241 F5 GENE: CPT | Performed by: INTERNAL MEDICINE

## 2021-03-23 PROCEDURE — 85652 RBC SED RATE AUTOMATED: CPT | Performed by: INTERNAL MEDICINE

## 2021-03-23 PROCEDURE — 86334 IMMUNOFIX E-PHORESIS SERUM: CPT | Performed by: INTERNAL MEDICINE

## 2021-03-23 PROCEDURE — 82784 ASSAY IGA/IGD/IGG/IGM EACH: CPT | Performed by: INTERNAL MEDICINE

## 2021-03-23 PROCEDURE — 86147 CARDIOLIPIN ANTIBODY EA IG: CPT | Performed by: INTERNAL MEDICINE

## 2021-03-23 RX ADMIN — ACETAMINOPHEN 650 MG: 325 TABLET ORAL at 20:00

## 2021-03-23 RX ADMIN — HEPARIN SODIUM 9.48 UNITS/KG/HR: 10000 INJECTION, SOLUTION INTRAVENOUS at 16:16

## 2021-03-23 RX ADMIN — LISINOPRIL: 20 TABLET ORAL at 08:45

## 2021-03-23 RX ADMIN — SODIUM CHLORIDE, PRESERVATIVE FREE 10 ML: 5 INJECTION INTRAVENOUS at 20:04

## 2021-03-23 RX ADMIN — SODIUM CHLORIDE 75 ML/HR: 9 INJECTION, SOLUTION INTRAVENOUS at 08:48

## 2021-03-23 RX ADMIN — PRAVASTATIN SODIUM 80 MG: 40 TABLET ORAL at 20:05

## 2021-03-23 RX ADMIN — TAMSULOSIN HYDROCHLORIDE 0.4 MG: 0.4 CAPSULE ORAL at 08:45

## 2021-03-23 RX ADMIN — ASPIRIN 81 MG: 81 TABLET, COATED ORAL at 08:45

## 2021-03-23 RX ADMIN — METOPROLOL SUCCINATE 50 MG: 50 TABLET, EXTENDED RELEASE ORAL at 08:45

## 2021-03-23 NOTE — PROGRESS NOTES
Discharge Planning Assessment  [facility]     Patient Name: Timi Wolf  MRN: [id number]  Today's Date: 3/23/2021    Admit Date: 3/22/2021    Discharge Needs Assessment     Row Name 03/23/21 1517       Living Environment    Lives With  spouse    Current Living Arrangements  home/apartment/condo    Potentially Unsafe Housing Conditions  other (see comments) no concerns    Primary Care Provided by  self    Provides Primary Care For  no one    Family Caregiver if Needed  spouse    Quality of Family Relationships  helpful;involved;supportive    Able to Return to Prior Arrangements  yes       Resource/Environmental Concerns    Resource/Environmental Concerns  none    Transportation Concerns  car, none       Transition Planning    Patient/Family Anticipates Transition to  home with family    Patient/Family Anticipated Services at Transition  none    Transportation Anticipated  family or friend will provide       Discharge Needs Assessment    Readmission Within the Last 30 Days  no previous admission in last 30 days    Equipment Currently Used at Home  none    Concerns to be Addressed  no discharge needs identified;denies needs/concerns at this time    Anticipated Changes Related to Illness  none    Equipment Needed After Discharge  none        Discharge Plan     Row Name 03/23/21 1517       Plan    Plan  Home with spouse    Patient/Family in Agreement with Plan  yes    Plan Comments  CCP met with patient at bedside. CCP role explained and discharge planning discussed. Face sheet verified and IMM noted. Patient’s PCP is Dr. Ruiz. Patient lives with his wife, in a single story home. Patient is independent with his ADLs and does not use DME. Patient has not used home health or been to SNF. Patient’s preferred pharmacy is [facility]. Patient does not anticipate any discharge needs and plans to return home at discharge. CCP will follow for any needs to arise. Yelena BELLA        Continued Care and Services  - Admitted Since 3/22/2021    Coordination has not been started for this encounter.         Demographic Summary     Row Name 03/23/21 1516       General Information    Admission Type  inpatient    Arrived From  emergency department    Required Notices Provided  Important Message from Medicare    Referral Source  admission list    Reason for Consult  discharge planning    Preferred Language  English     Used During This Interaction  no        Functional Status     Row Name 03/23/21 1516       Functional Status    Usual Activity Tolerance  good    Current Activity Tolerance  good       Functional Status, IADL    Medications  independent    Meal Preparation  independent    Housekeeping  independent    Laundry  independent    Shopping  independent       Mental Status    General Appearance WDL  WDL       Mental Status Summary    Recent Changes in Mental Status/Cognitive Functioning  no changes        Psychosocial    No documentation.       Abuse/Neglect    No documentation.       Legal    No documentation.       Substance Abuse    No documentation.       Patient Forms    No documentation.           SHAYNE Vance

## 2021-03-23 NOTE — PROGRESS NOTES
"General Surgery Progress Note    Summary: Mr. Timi Wolf is a 75 y.o. year old gentleman with portal vein thrombus and abdominal pain.  On heparin drip.  Will advance diet, no clinical or lab findings of ischemic bowel.    Chief Complaint: Abdominal pain    Interval Events: No acute events overnight.  Supratherapeutic on heparin.  Still with some abdominal pain but no worsening of symptoms.  Tolerates breakfast and hungry for real food.    Vitals: /77 (BP Location: Left arm, Patient Position: Lying)   Pulse 64   Temp 97.2 °F (36.2 °C) (Oral)   Resp 18   Ht 180.3 cm (71\")   Wt 90.7 kg (200 lb)   SpO2 93%   BMI 27.89 kg/m²     GENERAL: alert, well appearing, and in no distress  HEENT: normocephalic, atraumatic, moist mucous membranes, clear sclerae   CHEST: On room air, no increased work of breathing, symmetric air entry  CARDIAC: regular rate and rhythm    ABDOMEN: Soft, nondistended, minimally tender to palpation in the midepigastrium  EXTREMITIES: no cyanosis, clubbing, or edema   SKIN: Warm and moist, no rashes    Labs:  Results from last 7 days   Lab Units 03/23/21  0437 03/22/21  1528 03/22/21  1143   WBC 10*3/mm3 6.02 5.47 5.56   HEMOGLOBIN g/dL 13.3 14.8 14.1   HEMATOCRIT % 37.0* 42.0 41.8   PLATELETS 10*3/mm3 160 182 173   MONOCYTES % % 7.1  --   --      Results from last 7 days   Lab Units 03/23/21  0437 03/22/21  1143   SODIUM mmol/L 138 137   POTASSIUM mmol/L 3.7 3.8   CHLORIDE mmol/L 109* 106   CO2 mmol/L 22.2 22.5   BUN mg/dL 22 26*   CREATININE mg/dL 1.15 1.36*   CALCIUM mg/dL 8.6 9.5   BILIRUBIN mg/dL 0.6 0.5   ALK PHOS U/L 81 94   ALT (SGPT) U/L 17 17   AST (SGOT) U/L 17 17   GLUCOSE mg/dL 83 110*     Results from last 7 days   Lab Units 03/23/21  0702 03/23/21  0437 03/22/21  2222 03/22/21  1528   INR   --   --   --  1.07   APTT seconds 112.0* 192.7* >200.0* 47.2*       BAMBI FELIPE MD  General and Endoscopic Surgery  [facility]    [address] [place] [address] [address], [address]  [place], [place], [address]  P: [phone]  F: [phone]

## 2021-03-23 NOTE — PLAN OF CARE
Goal Outcome Evaluation:  Plan of Care Reviewed With: patient     Outcome Summary: pt a.o with no c.o pain for me. some pain he stated earlier but denied pain meds. Hep gtt infusing with last PTT @105 and changes done per protocol.pt educated on POC and goals for his stay in which pt verbalized understanding.   No s/s of distress noted. Will continue to monitor and support as needed.   Patient was seen and assessed while wearing personal protective equipment including facemask, protective eyewear and gloves.  Hand hygiene performed prior to entering the room and upon exiting with doffing of gloves.

## 2021-03-23 NOTE — CONSULTS
[facility] INITIAL INPATIENT CONSULTATION NOTE    REASON FOR CONSULTATION:    hypercoagulable state, portal vein thrombosis    HISTORY OF PRESENT ILLNESS:  Timi Wolf is a 75 y.o. male who we are asked to see today in consultation for hypercoagulable state.    Past medical history of arteriosclerotic cardiovascular disease, diverticulosis, GERD, HTN, renal cyst, and hyperlipidemia.   I have been asked to see this patient in consultation today, a 75-year-old white male who has had abdominal pain for almost 1 month in the right upper quadrant and has been progressively worse and becoming almost epigastric, especially in the morning with an intensity of 7 out of 10. He has occasional nausea but no vomiting. He has sensation of distended abdomen. He has not had excessive burping or belching and neither flatulence. He has been having normal bowel activity with no passage of blood in the stool. No diarrhea. He has not developed any jaundice. Urination has remained normal. He has had minimal swelling in his lower extremities and he states his feet remain cold all the time. Given the persistency of his symptoms and given the fact that he was seen at an urgent care center at [facility] and advised to be seen by Gastroenterology at [place] and given an appointment for 6 weeks, he decided to come to the emergency room. At the time of the emergency room visit, a CT scan of the abdomen was done that documented extensive thrombosis of the portal vein. The extension of the thrombosis was into the splenic vein into the superior mesenteric vein. The patient was placed immediately on heparin. Since then the symptoms have improved some. He has no nausea or vomiting this morning. His abdomen still remains kind of distended but he has had good bowel activity this morning with no passage of blood in the stool. He has not had any fever, chills or sweats. He denies any cough, sputum production or shortness of breath.  He has no bone pain. No joint pain. No rash in the skin. No alopecia. No sores in his mouth and no lesions in the skin otherwise like livedo reticularis. The patient denies any neurological symptoms. He denies any changes in performance status. His weight has remained stable. He is very much indispensable at home. His wife has multiple comorbidities and he is the only one that is able to handle her.        Past Medical History:   Diagnosis Date   • Abdominal pain    • Arteriosclerotic cardiovascular disease    • Diverticulosis    • Dysphagia    • GERD (gastroesophageal reflux disease)    • Hyperlipidemia    • Hypertension    • Internal hemorrhoids    • Prostate enlargement    • Renal cyst, right        Past Surgical History:   Procedure Laterality Date   • ACHILLES TENDON REPAIR      ruptured tendon   • COLONOSCOPY N/A 2012    Dr Alvarado/[facility]   • SHOULDER SURGERY         SOCIAL HISTORY:   reports that he has quit smoking. He has never used smokeless tobacco. He reports that he does not drink alcohol and does not use drugs.    FAMILY HISTORY:  family history includes Heart attack in his father; Heart disease in his father.    ALLERGIES:  Allergies   Allergen Reactions   • Sulfamethoxazole-Trimethoprim Itching     Abdominal pain as well        MEDICATIONS:  As listed in the electronic medical record.    Review of Systems   General: no fever, no chills,  fatigue,no weight changes, no lack of appetite.  Eyes: no epiphora, xerophthalmia,conjunctivitis, pain, glaucoma, blurred vision, blindness, secretion, photophobia, proptosis, diplopia.  Ears: no otorrhea, tinnitus, otorrhagia, deafness, pain, vertigo.  Nose: no rhinorrhea, no epistaxis, no alteration in perception of odors, no sinuses pressure.  Mouth: no alteration in gums or teeth,  No ulcers, no difficulty with mastication or deglut ion, no odynophagia.  Neck: no masses or pain, no thyroid alterations, no pain in muscles or arteries, no carotid odynia, no  crepitation.  Respiratory: no cough, no sputum production,no dyspnea,no trepopnea, no pleuritic pain,no hemoptysis.  Heart: no syncope, no irregularity, no palpitations, no angina,no orthopnea,no paroxysmal nocturnal dyspnea.  Vascular Venous: no tenderness,no edema,no palpable cords,no postphlebitic syndrome, no skin changes no ulcerations.  Vascular Arterial: no distal ischemia, noclaudication, no gangrene, no neuropathic ischemic pain, no skin ulcers, no paleness no cyanosis.  GI: no dysphagia, no odynophagia, no regurgitation, no heartburn,no indigestion,no nausea,no vomiting,no hematemesis ,no melena,no jaundice,stated distention, no obstipation,no enterorrhagia,no proctalgia,no anal  lesions, no changes in bowel habits.STATED PAIN  : no frequency, no hesitancy, no hematuria, no discharge,no  pain.  Musculoskeletal: no muscle or tendon pain or inflammation,no  joint pain, no edema, no functional limitation,no fasciculations, no mass.  Neurologic: no headache, no seizures, noalterations on Craneal nerves, no motor deficit, no sensory deficit, normal coordination, no alteration in memory,normal orientation, calculation,normal writting, verbal and written language.  Skin: no rashes,no pruritus no localized lesions.  Psychiatric: no anxiety, no depression,no agitation, no delusions, proper insight.      Vitals:    03/22/21 2100 03/22/21 2327 03/23/21 0501 03/23/21 0738   BP: 133/74 127/75 125/69 147/77   BP Location: Left arm Left arm Left arm Left arm   Patient Position: Lying Lying Lying Lying   Pulse: 53 53 66 64   Resp: 16 18 18 18   Temp: 98.6 °F (37 °C) 98.3 °F (36.8 °C) 99 °F (37.2 °C) 97.2 °F (36.2 °C)   TempSrc: Oral Oral Oral Oral   SpO2: 95% 94% 94% 93%   Weight:       Height:           Physical Exam  I HAVE PERSONALLY REVIEWED THE HISTORY OF THE PRESENT ILLNESS, PAST MEDICAL HISTORY, FAMILY HISTORY, SOCIAL HISTORY, ALLERGIES, MEDICATIONS STATED ABOVE IN THE  NOTE FROM TODAY.        GENERAL:   Well-developed, well-nourished  Patient  in no acute distress.   SKIN:  Warm, dry ,NO rashes,NO purpura ,NO petechiae.  HEENT:  Pupils were equal and reactive to light and accomodation, conjunctivae noninjected, no pterygium, normal extraocular movements, normal visual acuity.   NECK:  Supple with good range of motion; no thyromegaly or masses, no JVD or bruits, no cervical adenopathies.No carotid artery pain, no carotid abnormal pulsation , NO arterial dance.  LYMPHATICS:  No cervical, NO supraclavicular, NO axillary,NO epitrochlear , NO inguinal adenopathy.  CARDIAC   normal rate and regular rhythm, without murmur,NO rubs NO S3 NO S4 right or left . Normal femoral, popliteal, pedis, brachial and carotid pulses.  VASCULAR ARTERIAL: normal carotids,brachial,radial,femoral,popliteal, pedis pulses , no bruits.no paleness or cyanosis, no pain, no edema, no numbness, no gangrene.  VASCULAR VENOUS: no cyanosis, collateral circulation, varicosities, edema, palpable cords, pain, erythema.  ABDOMEN:  Soft, EPIGASTRIC PAIN with no hepatomegaly, no splenomegaly,no masses, no ascites, no collateral circulation, distention,no Pataskala sign, no abdominal pain, no inguinal hernias,no umbilical hernia, no abdominal bruits. Normal bowel sounds.  GENITAL: Not  Performed.  EXTREMITIES  AND SPINE:  No clubbing, cyanosis or edema, no deformities or pain .No kyphosis, scoliosis, deformities or pain in spine, ribs or pelvic bone.  NEUROLOGICAL:  Patient was awake, alert, oriented to time, person and place.        DIAGNOSTIC DATA:    Results from last 7 days   Lab Units 03/23/21  0437 03/22/21  1528 03/22/21  1143   WBC 10*3/mm3 6.02 5.47 5.56   HEMOGLOBIN g/dL 13.3 14.8 14.1   HEMATOCRIT % 37.0* 42.0 41.8   PLATELETS 10*3/mm3 160 182 173      Results from last 7 days   Lab Units 03/23/21  0437 03/22/21  1143   SODIUM mmol/L 138 137   POTASSIUM mmol/L 3.7 3.8   CHLORIDE mmol/L 109* 106   CO2 mmol/L 22.2 22.5   BUN mg/dL 22 26*   CREATININE  mg/dL 1.15 1.36*   CALCIUM mg/dL 8.6 9.5   BILIRUBIN mg/dL 0.6 0.5   ALK PHOS U/L 81 94   ALT (SGPT) U/L 17 17   AST (SGOT) U/L 17 17   GLUCOSE mg/dL 83 110*          IMAGING:  CT ABDOMEN PELVIS W CONTRAST-     HISTORY:  Abdominal pain for 2 weeks, mostly upper.      TECHNIQUE:  CT of the abdomen and pelvis was performed following the  administration of intravenous contrast. Radiation dose reduction  techniques were utilized, including automated exposure control and  exposure modulation based on body size.     COMPARISON:  Gallbladder ultrasound 14 2015. CT abdomen with contrast  03/06/2009.     FINDINGS:  Heart size is normal. There is no pericardial effusion. There is mild  bibasilar atelectasis and/or scarring. Pleural spaces are clear.  The liver is normal in size. There is a heterogeneous appearance of the  hepatic parenchyma with a focal area of ill-defined area of  hypoattenuation to the right of the falciform ligament. The gallbladder  is present. The pancreas, spleen, and adrenal glands are within normal  limits. There is a 1.5 cm fluid density left renal lesion, which likely  represents a cyst. A hypodense focus in the superior pole of the right  kidney is too small to accurately characterize. There are renal sinus  cysts on the left. There is no hydronephrosis. No pathologically  enlarged abdominal or pelvic lymph nodes are identified.   There is mild mesenteric edema and small volume free fluid. There is  extensive calcific aortoiliac atherosclerosis. There is complete  occlusion of the main portal vein through the portal confluence. There  is occlusion of portal vein branches bilaterally. There is complete  occlusion of the majority of the splenic vein. There is extension of  thrombus into the superior mesenteric vein and multiple distal branches.     There is a tiny hiatal hernia. There is no bowel obstruction. The  appendix is present and normal in size. There is scattered colonic  diverticula without  CT evidence of acute diverticulitis. There are no  dilated bowel loops. There are no focally thickened hyperenhancing  segments of small bowel or colon. There is fecalization of small bowel  loops, which can reflect slow transit. There is no definite evidence of  pneumatosis.  The bladder is mildly distended and within normal limits. The prostate  is present.  There is multilevel degenerative disc disease. There is diffuse osseous  demineralization.     IMPRESSION:     1.  Extensive thrombus involving the majority of the portal venous  system, as detailed above. No dilated or focally thickened  hyperenhancing loops of bowel and colon are identified. There is some  fecalization of small bowel loops, which can reflect slow transit, with  no definite evidence of pneumatosis.  2.  Patchy attenuation of the liver is likely perfusional. There may be  component of geographic steatosis. Additionally, given the extent of  portal vein thrombus, developing hepatic infarction cannot be excluded.  Attention on follow-up imaging is recommended.     Discussed with Dr. Muniz at 1:28 PM.     This report was finalized on 3/22/2021 1:32 PM by Dr. Judith Engel M.D.         Assessment/Plan       This is a 75 y.o. male with:    *This patient has been having abdominal pain for at least 2-3 weeks and this has been in crescendo in the right upper quadrant epigastric area to the point that he could not handle this anymore. He went to an urgent care center at [facility] and suggested to be seen by Gastroenterology. Appointment was given to him for 6 weeks. Then he talked to Dr. Alvarado and appointment given to him for a month then he could not handle the pain anymore and he decided to come to the emergency room. At the time of the admission, a CT scan of the abdomen was done that documented a large clot in the portal system that included the splenic vein, the superior mesenteric vein, and a very good part of the intrahepatic portal  vein. Obviously this raises the question about the nature of this and what is the phenomenon that he has that has triggered this problem. He has no personal history of thrombophilia, no family history of thrombophilia. His father  at age 40 from a heart condition. His mother is still alive, almost 90, in an assisted living facility in excellent health. He has 1 brother in good health. He has 2 children in good health. The patient is completely indispensable at home. He takes care of his wife who has multiple comorbidities and mental issues including maybe even the possibility of dementia.     The other phenomenon is the fact that he complains of cold feet. This has been an ongoing issue for a long time. Grossly I do not see anything different in the physical exam but I asked him to let me check his feet without socks and in a normal situation. I saw him today at lunchtime and I did not want to interrupt his lunchtime to examine his feet. This will require an assessment on clinical grounds tomorrow.     Obviously my recommendations for this patient at this time are as follows:   1. We need to do a thrombophilia assessment including factor V Leiden mutation, prothrombin gene mutation, antithrombin III, protein C, protein S, lupus anticoagulant, anticardiolipin antibody, anti-glycoprotein antibodies, antithrombin III, fibrinogen level, C-reactive protein, and a serum protein immunoelectrophoresis.   2. The patient needs to remain on heparin at least for the next 48 hours and eventually transfer to oral anticoagulant, for example, Eliquis.   3. As I mentioned I will need to do clinical assessment of circulation in his feet to figure out the nature of the cold feet that he complains of all the time. I do not know if this is a neuropathic situation or if this is a vascular situation.     The patient has urgency to go home to take care of his wife. I asked him to call his son or friends or family or neighbors to help  him to take care of his wife while he is in the hospital. A very quick admission and discharge on this patient can challenge his ability to function in the future in regard to thrombosis of the portal vein and I do not want to compromise this and I pointed out to him in these circumstances.     I will go ahead and make an appointment to see us in 2 or 3 weeks after the discharge to go over his laboratory parameters.     I anticipate that the patient probably will require anticoagulation for life. Typically the recommendation for anticoagulation in patients who have thrombosis in peculiar locations including portal vein is probably for life issue.     The patient is aware of all these circumstances and I pointed out to him that once that he remains on anticoagulant he will need to minimize the potential for falls and trauma. We do not want for him to be bleeding anywhere from any trauma if possibility arises in regard to poor protection in regard to his own body.     He is aware of that circumstance as well.

## 2021-03-23 NOTE — PLAN OF CARE
Goal Outcome Evaluation:  Plan of Care Reviewed With: patient  Progress: no change  Outcome Summary: Heparin drip continues, ptt >200, hold for 1 hr and decreased by 3 units/kg/hr, next lab at 0645; patient appeared to rest between care, VSS

## 2021-03-23 NOTE — PROGRESS NOTES
Name: Timi Wolf ADMIT: 3/22/2021   : 1945  PCP: Stanley Ruiz Jr., MD    MRN: [id number] LOS: 1 days   AGE/SEX: 75 y.o. male  ROOM: [facility]     Subjective   Subjective       He complains of upper abdominal pain that is relieved with narcotics. No N/V, no CP, SOB, fever chills. No hx of blood disorder.        Objective   Objective   Vital Signs  Temp:  [97.1 °F (36.2 °C)-99 °F (37.2 °C)] 97.2 °F (36.2 °C)  Heart Rate:  [53-71] 64  Resp:  [16-18] 18  BP: (125-164)/(69-97) 147/77  SpO2:  [93 %-97 %] 93 %  on   ;   Device (Oxygen Therapy): room air  Body mass index is 27.89 kg/m².  Physical Exam  Vitals reviewed.   Constitutional:       Appearance: Normal appearance. He is well-developed.   HENT:      Head: Normocephalic and atraumatic.   Cardiovascular:      Rate and Rhythm: Normal rate and regular rhythm.   Pulmonary:      Effort: Pulmonary effort is normal. No respiratory distress.      Breath sounds: Normal breath sounds.   Abdominal:      General: Bowel sounds are normal. There is no distension.      Palpations: Abdomen is soft. There is no mass.      Tenderness: There is no abdominal tenderness.      Hernia: No hernia is present.      Comments: Soft round distended. Mild upper abdominal tenderness   Skin:     General: Skin is warm and dry.   Neurological:      General: No focal deficit present.      Mental Status: He is alert and oriented to person, place, and time.   Psychiatric:         Behavior: Behavior normal.         Thought Content: Thought content normal.         Judgment: Judgment normal.         Results Review     I reviewed the patient's new clinical results.  Results from last 7 days   Lab Units 21  0437 21  1528 21  1143   WBC 10*3/mm3 6.02 5.47 5.56   HEMOGLOBIN g/dL 13.3 14.8 14.1   PLATELETS 10*3/mm3 160 182 173     Results from last 7 days   Lab Units 21  0437 21  1143   SODIUM mmol/L 138 137   POTASSIUM mmol/L 3.7 3.8   CHLORIDE mmol/L 109* 106   CO2  mmol/L 22.2 22.5   BUN mg/dL 22 26*   CREATININE mg/dL 1.15 1.36*   GLUCOSE mg/dL 83 110*   Estimated Creatinine Clearance: 65.9 mL/min (by C-G formula based on SCr of 1.15 mg/dL).  Results from last 7 days   Lab Units 03/23/21  0437 03/22/21  1143   ALBUMIN g/dL 3.10* 3.60   BILIRUBIN mg/dL 0.6 0.5   ALK PHOS U/L 81 94   AST (SGOT) U/L 17 17   ALT (SGPT) U/L 17 17     Results from last 7 days   Lab Units 03/23/21  0437 03/22/21  1143   CALCIUM mg/dL 8.6 9.5   ALBUMIN g/dL 3.10* 3.60       COVID19   Date Value Ref Range Status   03/22/2021 Not Detected Not Detected - Ref. Range Final     No results found for: HGBA1C, POCGLU    CT Abdomen Pelvis With Contrast  Narrative: CT ABDOMEN PELVIS W CONTRAST-     HISTORY:  Abdominal pain for 2 weeks, mostly upper.      TECHNIQUE:  CT of the abdomen and pelvis was performed following the  administration of intravenous contrast. Radiation dose reduction  techniques were utilized, including automated exposure control and  exposure modulation based on body size.     COMPARISON:  Gallbladder ultrasound 14 2015. CT abdomen with contrast  03/06/2009.     FINDINGS:  Heart size is normal. There is no pericardial effusion. There is mild  bibasilar atelectasis and/or scarring. Pleural spaces are clear.  The liver is normal in size. There is a heterogeneous appearance of the  hepatic parenchyma with a focal area of ill-defined area of  hypoattenuation to the right of the falciform ligament. The gallbladder  is present. The pancreas, spleen, and adrenal glands are within normal  limits. There is a 1.5 cm fluid density left renal lesion, which likely  represents a cyst. A hypodense focus in the superior pole of the right  kidney is too small to accurately characterize. There are renal sinus  cysts on the left. There is no hydronephrosis. No pathologically  enlarged abdominal or pelvic lymph nodes are identified.   There is mild mesenteric edema and small volume free fluid. There  is  extensive calcific aortoiliac atherosclerosis. There is complete  occlusion of the main portal vein through the portal confluence. There  is occlusion of portal vein branches bilaterally. There is complete  occlusion of the majority of the splenic vein. There is extension of  thrombus into the superior mesenteric vein and multiple distal branches.     There is a tiny hiatal hernia. There is no bowel obstruction. The  appendix is present and normal in size. There is scattered colonic  diverticula without CT evidence of acute diverticulitis. There are no  dilated bowel loops. There are no focally thickened hyperenhancing  segments of small bowel or colon. There is fecalization of small bowel  loops, which can reflect slow transit. There is no definite evidence of  pneumatosis.  The bladder is mildly distended and within normal limits. The prostate  is present.  There is multilevel degenerative disc disease. There is diffuse osseous  demineralization.     Impression:    1.  Extensive thrombus involving the majority of the portal venous  system, as detailed above. No dilated or focally thickened  hyperenhancing loops of bowel and colon are identified. There is some  fecalization of small bowel loops, which can reflect slow transit, with  no definite evidence of pneumatosis.  2.  Patchy attenuation of the liver is likely perfusional. There may be  component of geographic steatosis. Additionally, given the extent of  portal vein thrombus, developing hepatic infarction cannot be excluded.  Attention on follow-up imaging is recommended.     Discussed with Dr. Muniz at 1:28 PM.     This report was finalized on 3/22/2021 1:32 PM by Dr. Judith Engel M.D.     XR Chest 1 View  Narrative: XR CHEST 1 VW-     HISTORY: Male who is 75 years-old,  abdominal pain     TECHNIQUE: Frontal view of the chest     COMPARISON: None available     FINDINGS: The heart size is borderline. Aorta is tortuous. Pulmonary  vasculature is  unremarkable. No focal pulmonary consolidation, pleural  effusion, or pneumothorax. No acute osseous process.     Impression: No focal pulmonary consolidation. Borderline heart size.  Tortuous aorta. Follow-up as clinically indicated.     This report was finalized on 3/22/2021 12:44 PM by Dr. Bk Cortes M.D.       Scheduled Medications  amLODIPine-lisinopril 5-20 mg combo dose, , Oral, Q24H  aspirin, 81 mg, Oral, Daily  metoprolol succinate XL, 50 mg, Oral, Daily  pravastatin, 80 mg, Oral, Nightly  sodium chloride, 10 mL, Intravenous, Q12H  tamsulosin, 0.4 mg, Oral, Daily    Infusions  heparin (porcine), 16.5 Units/kg/hr, Last Rate: 11.5 Units/kg/hr (03/23/21 0845)  sodium chloride, 75 mL/hr, Last Rate: 75 mL/hr (03/23/21 0848)    Diet  Diet Regular       Assessment/Plan     Active Hospital Problems    Diagnosis  POA   • **Epigastric pain [R10.13]  Yes   • Portal vein thrombosis [I81]  Unknown   • Hypercoagulable state (CMS/HCC) [D68.59]  Unknown   • CKD (chronic kidney disease) [N18.9]  Unknown   • Hypertension [I10]  Yes   • Chronic coronary artery disease [I25.10]  Yes      Resolved Hospital Problems   No resolved problems to display.       75 y.o. male admitted with Epigastric pain.  Portal vein thrombosis  · etiology unclear, oncology consulted for hypercoagulable workup  · General surgery following.   · Continue heparin     FADY   · Resolved with IVF. Adequate po intake. Stop IVF  · avoid nephrotoxic agents     Hypertension  · BP stable.   · continue antihypertensive regimen      Coronary artery disease -stable, asymptomatic      · heparin  for DVT prophylaxis.  · Full code.  · Discussed with patient and nursing staff.  ·        SPARKLE Carr  [facility]  03/23/21  12:57 EDT

## 2021-03-24 DIAGNOSIS — R79.9 ABNORMAL FINDING OF BLOOD CHEMISTRY, UNSPECIFIED: ICD-10-CM

## 2021-03-24 DIAGNOSIS — R93.5 ABNORMAL FINDINGS ON DIAGNOSTIC IMAGING OF OTHER ABDOMINAL REGIONS, INCLUDING RETROPERITONEUM: ICD-10-CM

## 2021-03-24 DIAGNOSIS — N40.0 BENIGN PROSTATIC HYPERPLASIA, UNSPECIFIED WHETHER LOWER URINARY TRACT SYMPTOMS PRESENT: Primary | ICD-10-CM

## 2021-03-24 LAB
ALBUMIN SERPL ELPH-MCNC: 3.3 G/DL (ref 2.9–4.4)
ALBUMIN/GLOB SERPL: 1.2 {RATIO} (ref 0.7–1.7)
ALPHA1 GLOB SERPL ELPH-MCNC: 0.4 G/DL (ref 0–0.4)
ALPHA2 GLOB SERPL ELPH-MCNC: 0.7 G/DL (ref 0.4–1)
APTT PPP: 94.8 SECONDS (ref 22.7–35.4)
B-GLOBULIN SERPL ELPH-MCNC: 1 G/DL (ref 0.7–1.3)
BASOPHILS # BLD AUTO: 0.04 10*3/MM3 (ref 0–0.2)
BASOPHILS NFR BLD AUTO: 0.8 % (ref 0–1.5)
CARDIOLIPIN IGA SER IA-ACNC: <9 APL U/ML (ref 0–11)
CARDIOLIPIN IGG SER IA-ACNC: <9 GPL U/ML (ref 0–14)
CARDIOLIPIN IGM SER IA-ACNC: 19 MPL U/ML (ref 0–12)
DEPRECATED RDW RBC AUTO: 39.8 FL (ref 37–54)
EOSINOPHIL # BLD AUTO: 0.35 10*3/MM3 (ref 0–0.4)
EOSINOPHIL NFR BLD AUTO: 6.8 % (ref 0.3–6.2)
ERYTHROCYTE [DISTWIDTH] IN BLOOD BY AUTOMATED COUNT: 12.3 % (ref 12.3–15.4)
F5 GENE MUT ANL BLD/T: ABNORMAL
FACTOR II, DNA ANALYSIS: NORMAL
GAMMA GLOB SERPL ELPH-MCNC: 0.9 G/DL (ref 0.4–1.8)
GLOBULIN SER-MCNC: 2.9 G/DL (ref 2.2–3.9)
HCT VFR BLD AUTO: 38.1 % (ref 37.5–51)
HGB BLD-MCNC: 12.8 G/DL (ref 13–17.7)
IGA SERPL-MCNC: 127 MG/DL (ref 61–437)
IGG SERPL-MCNC: 849 MG/DL (ref 603–1613)
IGM SERPL-MCNC: 116 MG/DL (ref 15–143)
IMM GRANULOCYTES # BLD AUTO: 0.03 10*3/MM3 (ref 0–0.05)
IMM GRANULOCYTES NFR BLD AUTO: 0.6 % (ref 0–0.5)
INTERPRETATION SERPL IEP-IMP: ABNORMAL
KAPPA LC FREE SER-MCNC: 24.1 MG/L (ref 3.3–19.4)
KAPPA LC FREE/LAMBDA FREE SER: 1.14 {RATIO} (ref 0.26–1.65)
LABORATORY COMMENT REPORT: ABNORMAL
LAMBDA LC FREE SERPL-MCNC: 21.2 MG/L (ref 5.7–26.3)
LYMPHOCYTES # BLD AUTO: 1.84 10*3/MM3 (ref 0.7–3.1)
LYMPHOCYTES NFR BLD AUTO: 35.8 % (ref 19.6–45.3)
M PROTEIN SERPL ELPH-MCNC: ABNORMAL G/DL
MCH RBC QN AUTO: 29.6 PG (ref 26.6–33)
MCHC RBC AUTO-ENTMCNC: 33.6 G/DL (ref 31.5–35.7)
MCV RBC AUTO: 88.2 FL (ref 79–97)
MONOCYTES # BLD AUTO: 0.71 10*3/MM3 (ref 0.1–0.9)
MONOCYTES NFR BLD AUTO: 13.8 % (ref 5–12)
NEUTROPHILS NFR BLD AUTO: 2.17 10*3/MM3 (ref 1.7–7)
NEUTROPHILS NFR BLD AUTO: 42.2 % (ref 42.7–76)
NRBC BLD AUTO-RTO: 0 /100 WBC (ref 0–0.2)
PLATELET # BLD AUTO: 157 10*3/MM3 (ref 140–450)
PMV BLD AUTO: 9.7 FL (ref 6–12)
PROT SERPL-MCNC: 6.2 G/DL (ref 6–8.5)
RBC # BLD AUTO: 4.32 10*6/MM3 (ref 4.14–5.8)
WBC # BLD AUTO: 5.14 10*3/MM3 (ref 3.4–10.8)

## 2021-03-24 PROCEDURE — 99233 SBSQ HOSP IP/OBS HIGH 50: CPT | Performed by: INTERNAL MEDICINE

## 2021-03-24 PROCEDURE — 25010000002 ONDANSETRON PER 1 MG: Performed by: INTERNAL MEDICINE

## 2021-03-24 PROCEDURE — 85730 THROMBOPLASTIN TIME PARTIAL: CPT | Performed by: INTERNAL MEDICINE

## 2021-03-24 PROCEDURE — 25010000002 HEPARIN (PORCINE) PER 1000 UNITS: Performed by: INTERNAL MEDICINE

## 2021-03-24 PROCEDURE — 85025 COMPLETE CBC W/AUTO DIFF WBC: CPT | Performed by: INTERNAL MEDICINE

## 2021-03-24 PROCEDURE — 99232 SBSQ HOSP IP/OBS MODERATE 35: CPT | Performed by: STUDENT IN AN ORGANIZED HEALTH CARE EDUCATION/TRAINING PROGRAM

## 2021-03-24 RX ORDER — HEPARIN SODIUM 5000 [USP'U]/ML
40-80 INJECTION, SOLUTION INTRAVENOUS; SUBCUTANEOUS EVERY 6 HOURS PRN
Status: ACTIVE | OUTPATIENT
Start: 2021-03-24 | End: 2021-03-25

## 2021-03-24 RX ORDER — HEPARIN SODIUM 10000 [USP'U]/100ML
16.5 INJECTION, SOLUTION INTRAVENOUS
Status: DISPENSED | OUTPATIENT
Start: 2021-03-24 | End: 2021-03-25

## 2021-03-24 RX ADMIN — ONDANSETRON 4 MG: 2 INJECTION INTRAMUSCULAR; INTRAVENOUS at 08:15

## 2021-03-24 RX ADMIN — HEPARIN SODIUM 9.49 UNITS/KG/HR: 10000 INJECTION, SOLUTION INTRAVENOUS at 21:16

## 2021-03-24 RX ADMIN — ASPIRIN 81 MG: 81 TABLET, COATED ORAL at 08:15

## 2021-03-24 RX ADMIN — METOPROLOL SUCCINATE 50 MG: 50 TABLET, EXTENDED RELEASE ORAL at 08:15

## 2021-03-24 RX ADMIN — SODIUM CHLORIDE, PRESERVATIVE FREE 10 ML: 5 INJECTION INTRAVENOUS at 08:15

## 2021-03-24 RX ADMIN — SODIUM CHLORIDE, PRESERVATIVE FREE 10 ML: 5 INJECTION INTRAVENOUS at 19:41

## 2021-03-24 RX ADMIN — LISINOPRIL: 20 TABLET ORAL at 08:15

## 2021-03-24 RX ADMIN — PRAVASTATIN SODIUM 80 MG: 40 TABLET ORAL at 19:42

## 2021-03-24 RX ADMIN — TAMSULOSIN HYDROCHLORIDE 0.4 MG: 0.4 CAPSULE ORAL at 08:15

## 2021-03-24 NOTE — PROGRESS NOTES
"General Surgery Progress Note    Summary: Mr. Timi Wolf is a 75 y.o. year old gentleman with portal vein thrombus and abdominal pain.  On heparin drip, anticoagulation and hypercoaguable workup per hematology. On a regular diet with improvement in abdominal pain. No surgical intervention planned, ok for d/c when ready from medical standpoint.     Chief Complaint: Abdominal pain    Interval Events: No acute events overnight. Some distention but abdominal pain improving. Had a BM yesterday.    Vitals: /78 (BP Location: Left arm, Patient Position: Sitting)   Pulse 61   Temp 97.3 °F (36.3 °C) (Oral)   Resp 18   Ht 180.3 cm (71\")   Wt 90.7 kg (200 lb)   SpO2 94%   BMI 27.89 kg/m²     GENERAL: alert, well appearing, and in no distress  HEENT: normocephalic, atraumatic, moist mucous membranes, clear sclerae   CHEST: On room air, no increased work of breathing, symmetric air entry  CARDIAC: regular rate and rhythm    ABDOMEN: Soft, mildly distended, non-tender to palpation in the midepigastrium  EXTREMITIES: no cyanosis, clubbing, or edema   SKIN: Warm and moist, no rashes    Labs:  Results from last 7 days   Lab Units 03/24/21  0651 03/23/21  0437 03/22/21  1528   WBC 10*3/mm3 5.14 6.02 5.47   HEMOGLOBIN g/dL 12.8* 13.3 14.8   HEMATOCRIT % 38.1 37.0* 42.0   PLATELETS 10*3/mm3 157 160 182   MONOCYTES % %  --  7.1  --      Results from last 7 days   Lab Units 03/23/21  0437 03/22/21  1143   SODIUM mmol/L 138 137   POTASSIUM mmol/L 3.7 3.8   CHLORIDE mmol/L 109* 106   CO2 mmol/L 22.2 22.5   BUN mg/dL 22 26*   CREATININE mg/dL 1.15 1.36*   CALCIUM mg/dL 8.6 9.5   BILIRUBIN mg/dL 0.6 0.5   ALK PHOS U/L 81 94   ALT (SGPT) U/L 17 17   AST (SGOT) U/L 17 17   GLUCOSE mg/dL 83 110*     Results from last 7 days   Lab Units 03/24/21  0651 03/23/21  2205 03/23/21  1503 03/22/21  1528   INR   --   --   --  1.07   APTT seconds 94.8* 75.9* 105.4* 47.2*       BAMBI FELIPE MD  General and Endoscopic Surgery  [facility]    [address], [address]  [place], [place], [address]  P: [phone]  F: [phone]

## 2021-03-24 NOTE — PROGRESS NOTES
[facility] INPATIENT PROGRESS NOTE    Length of Stay:  2 days    CHIEF COMPLAINT/REASON FOR VISIT:  hypercoagulable state, portal vein thrombosis     INTERVAL HISTORY:  3/24/2021  Afebrile and vital signs stable.   PTT 94.8 and remains on heparin gtt  Morning labs pending, hypercoagulable labs pendng  Now on regular diet  Minimal abd pain no distention , mild nausea this am relieved with iv nausea med, good bm    HISTORY OF PRESENT ILLNESS:  Timi Wolf is a 75 y.o. male who we are asked to see today in consultation for hypercoagulable state.     Past medical history of arteriosclerotic cardiovascular disease, diverticulosis, GERD, HTN, renal cyst, and hyperlipidemia.   I have been asked to see this patient in consultation today, a 75-year-old white male who has had abdominal pain for almost 1 month in the right upper quadrant and has been progressively worse and becoming almost epigastric, especially in the morning with an intensity of 7 out of 10. He has occasional nausea but no vomiting. He has sensation of distended abdomen. He has not had excessive burping or belching and neither flatulence. He has been having normal bowel activity with no passage of blood in the stool. No diarrhea. He has not developed any jaundice. Urination has remained normal. He has had minimal swelling in his lower extremities and he states his feet remain cold all the time. Given the persistency of his symptoms and given the fact that he was seen at an urgent care center at [facility] and advised to be seen by Gastroenterology at [place] and given an appointment for 6 weeks, he decided to come to the emergency room. At the time of the emergency room visit, a CT scan of the abdomen was done that documented extensive thrombosis of the portal vein. The extension of the thrombosis was into the splenic vein into the superior mesenteric vein. The patient was placed immediately on heparin. Since then the symptoms have  improved some. He has no nausea or vomiting this morning. His abdomen still remains kind of distended but he has had good bowel activity this morning with no passage of blood in the stool. He has not had any fever, chills or sweats. He denies any cough, sputum production or shortness of breath. He has no bone pain. No joint pain. No rash in the skin. No alopecia. No sores in his mouth and no lesions in the skin otherwise like livedo reticularis. The patient denies any neurological symptoms. He denies any changes in performance status. His weight has remained stable. He is very much indispensable at home. His wife has multiple comorbidities and he is the only one that is able to handle her.        Review of Systems   General: no fever, no chills, no fatigue,no weight changes, no lack of appetite.  Eyes: no epiphora, xerophthalmia,conjunctivitis, pain, glaucoma, blurred vision, blindness, secretion, photophobia, proptosis, diplopia.  Ears: no otorrhea, tinnitus, otorrhagia, deafness, pain, vertigo.  Nose: no rhinorrhea, no epistaxis, no alteration in perception of odors, no sinuses pressure.  Mouth: no alteration in gums or teeth,  No ulcers, no difficulty with mastication or deglut ion, no odynophagia.  Neck: no masses or pain, no thyroid alterations, no pain in muscles or arteries, no carotid odynia, no crepitation.  Respiratory: no cough, no sputum production,no dyspnea,no trepopnea, no pleuritic pain,no hemoptysis.  Heart: no syncope, no irregularity, no palpitations, no angina,no orthopnea,no paroxysmal nocturnal dyspnea.  Vascular Venous: no tenderness,no edema,no palpable cords,no postphlebitic syndrome, no skin changes no ulcerations.  Vascular Arterial: no distal ischemia, noclaudication, no gangrene, no neuropathic ischemic pain, no skin ulcers, no paleness no cyanosis.  GI: no dysphagia, no odynophagia, no regurgitation, no heartburn,no indigestion,stated nausea,no vomiting,no hematemesis ,no melena,no  jaundice,no distention, no obstipation,no enterorrhagia,no proctalgia,no anal  lesions, no changes in bowel habits.  : no frequency, no hesitancy, no hematuria, no discharge,no  pain.  Musculoskeletal: no muscle or tendon pain or inflammation,no  joint pain, no edema, no functional limitation,no fasciculations, no mass.  Neurologic: no headache, no seizures, noalterations on Craneal nerves, no motor deficit, no sensory deficit, normal coordination, no alteration in memory,normal orientation, calculation,normal writting, verbal and written language.  Skin: no rashes,no pruritus no localized lesions.  Psychiatric: no anxiety, no depression,no agitation, no delusions, proper insight.      OBJECTIVE:  Vitals:    03/23/21 1605 03/23/21 1953 03/24/21 0433 03/24/21 0755   BP: 126/70 132/71 149/79 132/78   BP Location: Left arm Left arm Left arm Left arm   Patient Position: Lying Lying Lying Sitting   Pulse: 65 60 67 61   Resp: 16 18 18 18   Temp: 98.1 °F (36.7 °C) 98.8 °F (37.1 °C) 97.6 °F (36.4 °C) 97.3 °F (36.3 °C)   TempSrc: Oral Oral Oral Oral   SpO2: 94% 96% 96% 94%   Weight:       Height:         Physical Exam   I HAVE PERSONALLY REVIEWED THE HISTORY OF THE PRESENT ILLNESS, PAST MEDICAL HISTORY, FAMILY HISTORY, SOCIAL HISTORY, ALLERGIES, MEDICATIONS STATED ABOVE IN THE OFFICE NOTE FROM TODAY.        GENERAL:  Well-developed, well-nourished  Patient  in no acute distress.   SKIN:  Warm, dry ,NO rashes,NO purpura ,NO petechiae.  HEENT:  Pupils were equal and reactive to light and accomodation, conjunctivae noninjected, no pterygium, normal extraocular movements, normal visual acuity.   NECK:  Supple with good range of motion; no thyromegaly or masses, no JVD or bruits, no cervical adenopathies.No carotid artery pain, no carotid abnormal pulsation , NO arterial dance.  LYMPHATICS:  No cervical, NO supraclavicular, NO axillary,NO epitrochlear , NO inguinal adenopathy.  CARDIAC   normal rate and regular rhythm, without  murmur,NO rubs NO S3 NO S4 right or left . Normal femoral, popliteal, pedis, brachial and carotid pulses.  VASCULAR ARTERIAL: normal carotids,brachial,radial,femoral,popliteal, pedis pulses , no bruits.no paleness or cyanosis, no pain, no edema, no numbness, no gangrene.I did perform a detailed vascular exam in this patient today including the palpation of femoral, popliteal, posterior tibialis and pedis pulses and they remain 2+ symmetric. The temperature of his feet is normal. The capillary refill is normal. The patient has no claudication and he has no bruit in the femoral arteries. My conclusion is that the coldness that he complains evades to another factor. Please review below.      VASCULAR VENOUS: no cyanosis, collateral circulation, varicosities, edema, palpable cords, pain, erythema.  ABDOMEN:  Soft, nontender with no hepatomegaly, no splenomegaly,no masses, no ascites, no collateral circulation,no distention,no Dereck sign, no abdominal pain, no inguinal hernias,no umbilical hernia, no abdominal bruits. Normal bowel sounds.  GENITAL: Not  Performed.  EXTREMITIES  AND SPINE:  No clubbing, cyanosis or edema, no deformities or pain .No kyphosis, scoliosis, deformities or pain in spine, ribs or pelvic bone.  NEUROLOGICAL:  Patient was awake, alert, oriented to time, person and place.        DIAGNOSTIC DATA:  Results Review:     I reviewed the patient's new clinical results.    Results from last 7 days   Lab Units 03/23/21  0437 03/22/21  1528 03/22/21  1143   WBC 10*3/mm3 6.02 5.47 5.56   HEMOGLOBIN g/dL 13.3 14.8 14.1   HEMATOCRIT % 37.0* 42.0 41.8   PLATELETS 10*3/mm3 160 182 173      Results from last 7 days   Lab Units 03/23/21  0437 03/22/21  1143   SODIUM mmol/L 138 137   POTASSIUM mmol/L 3.7 3.8   CHLORIDE mmol/L 109* 106   CO2 mmol/L 22.2 22.5   BUN mg/dL 22 26*   CREATININE mg/dL 1.15 1.36*   CALCIUM mg/dL 8.6 9.5   BILIRUBIN mg/dL 0.6 0.5   ALK PHOS U/L 81 94   ALT (SGPT) U/L 17 17   AST (SGOT) U/L  17 17   GLUCOSE mg/dL 83 110*      Lab Results   Component Value Date    NEUTROABS 3.53 2021     Results from last 7 days   Lab Units 21  0651 21  2205 21  1503 21  1528   INR   --   --   --  1.07   APTT seconds 94.8* 75.9* 105.4* 47.2*                 Assessment/Plan   ASSESSMENT/PLAN:  This is a 75 y.o. male with:     This patient has been having abdominal pain for at least 2-3 weeks and this has been in crescendo in the right upper quadrant epigastric area to the point that he could not handle this anymore. He went to an urgent care center at [facility] and suggested to be seen by Gastroenterology. Appointment was given to him for 6 weeks. Then he talked to Dr. Alvarado and appointment given to him for a month then he could not handle the pain anymore and he decided to come to the emergency room. At the time of the admission, a CT scan of the abdomen was done that documented a large clot in the portal system that included the splenic vein, the superior mesenteric vein, and a very good part of the intrahepatic portal vein. Obviously this raises the question about the nature of this and what is the phenomenon that he has that has triggered this problem. He has no personal history of thrombophilia, no family history of thrombophilia. His father  at age 40 from a heart condition. His mother is still alive, almost 90, in an assisted living facility in excellent health. He has 1 brother in good health. He has 2 children in good health. The patient is completely indispensable at home. He takes care of his wife who has multiple comorbidities and mental issues including maybe even the possibility of dementia.      The other phenomenon is the fact that he complains of cold feet. This has been an ongoing issue for a long time. Grossly I do not see anything different in the physical exam but I asked him to let me check his feet without socks and in a normal situation. I saw him today at  lunchtime and I did not want to interrupt his lunchtime to examine his feet. This will require an assessment on clinical grounds tomorrow.      Obviously my recommendations for this patient at this time are as follows:   1. We need to do a thrombophilia assessment including factor V Leiden mutation, prothrombin gene mutation, antithrombin III, protein C, protein S, lupus anticoagulant, anticardiolipin antibody, anti-glycoprotein antibodies, antithrombin III, fibrinogen level, C-reactive protein, and a serum protein immunoelectrophoresis.   2. The patient needs to remain on heparin at least for the next 48 hours and eventually transfer to oral anticoagulant, for example, Eliquis.   3. As I mentioned I will need to do clinical assessment of circulation in his feet to figure out the nature of the cold feet that he complains of all the time. I do not know if this is a neuropathic situation or if this is a vascular situation.      The patient has urgency to go home to take care of his wife. I asked him to call his son or friends or family or neighbors to help him to take care of his wife while he is in the hospital. A very quick admission and discharge on this patient can challenge his ability to function in the future in regard to thrombosis of the portal vein and I do not want to compromise this and I pointed out to him in these circumstances.      On 03/24/2021, I did a detailed vascular exam of his lower extremities. He has excellent capillary refill in his toes. He has normal sensory modalities. He has full pulses, popliteal, posterior tibialis and pedis as well as femoral and no femoral artery bruits. My conclusion is that the sensation of coldness that he has is more neuropathic in nature than anything else and that raises the question if neuropathy associated with thrombophilia makes any sense and sometimes monoclonal proteins can do this kind of problem.     None of his laboratory testing in regard to  thrombophilia is available.     His PTT is therapeutic. He has no clinical bleeding. His abdominal pain has decreased and I think his portal vein clot is better.     I discussed with pharmacist the initiation of Eliquis therapy tomorrow by mouth and watch him for 24 hours. If everything is okay he will be discharged as early as Friday.     This patient indeed has the urgency to go home to take care of his wife who is now watched intermittently by the patient’s son.     I already made an appointment for him to come back and see us in a few weeks.     I reviewed his alpha-fetoprotein level at the time of the admission and that was normal. That leads me to believe that probably with a normal liver morphology, hepatocellular carcinoma is not part of the picture of portal vein thrombosis.     The patient was ready to proceed.     Eventually I think he will require some referral for neuropathy analysis and further discussion about this down the road.    DURING THE VISIT WITH THE PATIENT TODAY , PATIENT HAD FACE MASK, I HAD PROPER PROTECTIVE EQUIPMENT, AND I DID HAND HYGIENE WITH SOAP AND WATER BEFORE AND AFTER THE VISIT.

## 2021-03-24 NOTE — PROGRESS NOTES
Name: Timi Wolf ADMIT: 3/22/2021   : 1945  PCP: Stanley Ruiz Jr., MD    MRN: [id number] LOS: 2 days   AGE/SEX: 75 y.o. male  ROOM: [facility]     Subjective   Subjective       He complains of very mild  abdominal pain but has not needed narcotics. No N/V, no CP, SOB, fever chills.  Tolerating diet.      Objective   Objective   Vital Signs  Temp:  [97.3 °F (36.3 °C)-98.8 °F (37.1 °C)] 97.8 °F (36.6 °C)  Heart Rate:  [60-67] 61  Resp:  [16-18] 18  BP: (121-149)/(67-79) 121/67  SpO2:  [94 %-96 %] 94 %  on   ;   Device (Oxygen Therapy): room air  Body mass index is 27.89 kg/m².  Physical Exam  Vitals reviewed.   Constitutional:       Appearance: Normal appearance. He is well-developed.   HENT:      Head: Normocephalic and atraumatic.   Cardiovascular:      Rate and Rhythm: Normal rate and regular rhythm.   Pulmonary:      Effort: Pulmonary effort is normal. No respiratory distress.      Breath sounds: Normal breath sounds.   Abdominal:      General: Bowel sounds are normal. There is no distension.      Palpations: Abdomen is soft. There is no mass.      Tenderness: There is no abdominal tenderness.      Hernia: No hernia is present.      Comments: Soft round distended. Mild upper abdominal tenderness   Skin:     General: Skin is warm and dry.   Neurological:      General: No focal deficit present.      Mental Status: He is alert and oriented to person, place, and time.   Psychiatric:         Behavior: Behavior normal.         Thought Content: Thought content normal.         Judgment: Judgment normal.         Results Review     I reviewed the patient's new clinical results.  Results from last 7 days   Lab Units 21  0651 21  0437 21  1528 21  1143   WBC 10*3/mm3 5.14 6.02 5.47 5.56   HEMOGLOBIN g/dL 12.8* 13.3 14.8 14.1   PLATELETS 10*3/mm3 157 160 182 173     Results from last 7 days   Lab Units 21  0437 21  1143   SODIUM mmol/L 138 137   POTASSIUM mmol/L 3.7 3.8    CHLORIDE mmol/L 109* 106   CO2 mmol/L 22.2 22.5   BUN mg/dL 22 26*   CREATININE mg/dL 1.15 1.36*   GLUCOSE mg/dL 83 110*   Estimated Creatinine Clearance: 65.9 mL/min (by C-G formula based on SCr of 1.15 mg/dL).  Results from last 7 days   Lab Units 03/23/21  0437 03/22/21  1143   ALBUMIN g/dL 3.10* 3.60   BILIRUBIN mg/dL 0.6 0.5   ALK PHOS U/L 81 94   AST (SGOT) U/L 17 17   ALT (SGPT) U/L 17 17     Results from last 7 days   Lab Units 03/23/21  0437 03/22/21  1143   CALCIUM mg/dL 8.6 9.5   ALBUMIN g/dL 3.10* 3.60       COVID19   Date Value Ref Range Status   03/22/2021 Not Detected Not Detected - Ref. Range Final     No results found for: HGBA1C, POCGLU    CT Abdomen Pelvis With Contrast  Narrative: CT ABDOMEN PELVIS W CONTRAST-     HISTORY:  Abdominal pain for 2 weeks, mostly upper.      TECHNIQUE:  CT of the abdomen and pelvis was performed following the  administration of intravenous contrast. Radiation dose reduction  techniques were utilized, including automated exposure control and  exposure modulation based on body size.     COMPARISON:  Gallbladder ultrasound 14 2015. CT abdomen with contrast  03/06/2009.     FINDINGS:  Heart size is normal. There is no pericardial effusion. There is mild  bibasilar atelectasis and/or scarring. Pleural spaces are clear.  The liver is normal in size. There is a heterogeneous appearance of the  hepatic parenchyma with a focal area of ill-defined area of  hypoattenuation to the right of the falciform ligament. The gallbladder  is present. The pancreas, spleen, and adrenal glands are within normal  limits. There is a 1.5 cm fluid density left renal lesion, which likely  represents a cyst. A hypodense focus in the superior pole of the right  kidney is too small to accurately characterize. There are renal sinus  cysts on the left. There is no hydronephrosis. No pathologically  enlarged abdominal or pelvic lymph nodes are identified.   There is mild mesenteric edema and small  volume free fluid. There is  extensive calcific aortoiliac atherosclerosis. There is complete  occlusion of the main portal vein through the portal confluence. There  is occlusion of portal vein branches bilaterally. There is complete  occlusion of the majority of the splenic vein. There is extension of  thrombus into the superior mesenteric vein and multiple distal branches.     There is a tiny hiatal hernia. There is no bowel obstruction. The  appendix is present and normal in size. There is scattered colonic  diverticula without CT evidence of acute diverticulitis. There are no  dilated bowel loops. There are no focally thickened hyperenhancing  segments of small bowel or colon. There is fecalization of small bowel  loops, which can reflect slow transit. There is no definite evidence of  pneumatosis.  The bladder is mildly distended and within normal limits. The prostate  is present.  There is multilevel degenerative disc disease. There is diffuse osseous  demineralization.     Impression:    1.  Extensive thrombus involving the majority of the portal venous  system, as detailed above. No dilated or focally thickened  hyperenhancing loops of bowel and colon are identified. There is some  fecalization of small bowel loops, which can reflect slow transit, with  no definite evidence of pneumatosis.  2.  Patchy attenuation of the liver is likely perfusional. There may be  component of geographic steatosis. Additionally, given the extent of  portal vein thrombus, developing hepatic infarction cannot be excluded.  Attention on follow-up imaging is recommended.     Discussed with Dr. Muniz at 1:28 PM.     This report was finalized on 3/22/2021 1:32 PM by Dr. Judith Engel M.D.     XR Chest 1 View  Narrative: XR CHEST 1 VW-     HISTORY: Male who is 75 years-old,  abdominal pain     TECHNIQUE: Frontal view of the chest     COMPARISON: None available     FINDINGS: The heart size is borderline. Aorta is tortuous.  Pulmonary  vasculature is unremarkable. No focal pulmonary consolidation, pleural  effusion, or pneumothorax. No acute osseous process.     Impression: No focal pulmonary consolidation. Borderline heart size.  Tortuous aorta. Follow-up as clinically indicated.     This report was finalized on 3/22/2021 12:44 PM by Dr. Bk Cortes M.D.       Scheduled Medications  amLODIPine-lisinopril 5-20 mg combo dose, , Oral, Q24H  aspirin, 81 mg, Oral, Daily  metoprolol succinate XL, 50 mg, Oral, Daily  pravastatin, 80 mg, Oral, Nightly  sodium chloride, 10 mL, Intravenous, Q12H  tamsulosin, 0.4 mg, Oral, Daily    Infusions  heparin (porcine), 16.5 Units/kg/hr, Last Rate: 9.482 Units/kg/hr (03/23/21 1616)    Diet  Diet Regular       Assessment/Plan     Active Hospital Problems    Diagnosis  POA   • **Epigastric pain [R10.13]  Yes   • Portal vein thrombosis [I81]  Unknown   • Hypercoagulable state (CMS/HCC) [D68.59]  Unknown   • CKD (chronic kidney disease) [N18.9]  Unknown   • Hypertension [I10]  Yes   • Chronic coronary artery disease [I25.10]  Yes      Resolved Hospital Problems   No resolved problems to display.       75 y.o. male admitted with Epigastric pain.  Portal vein thrombosis  · etiology unclear, appreciate oncology assistance. Workup in progress.    · heparin until tmrw morning then change to OAC, defer choice to oncology.  ·  General surgery s/o given no need for intervention       FADY   · Resolved with IVF. Adequate po intake. Stop IVF  · avoid nephrotoxic agents     Hypertension  · BP stable.   · continue antihypertensive regimen      Coronary artery disease -stable, asymptomatic      · heparin  for DVT prophylaxis.  · Full code.  · Discussed with patient and nursing staff.  ·  anticipate discharge on Friday       SPARKLE Carr  [facility]  03/24/21  13:09 EDT

## 2021-03-24 NOTE — PLAN OF CARE
Goal Outcome Evaluation:  Plan of Care Reviewed With: patient  Progress: no change  Outcome Summary: heparin @ 9.5 units/kg/hr, ptt therapeutic with no changes, next PTT at 0600; patient c/o headache (tylenol) and some indigestion (zofran offered/refused); vitals stable

## 2021-03-24 NOTE — PLAN OF CARE
Goal Outcome Evaluation:  Plan of Care Reviewed With: patient  Progress: no change  Outcome Summary: Heparin drip continues at 9.5units/kg/hr. PTT was therapeutic, will recheck in AM. No c/o pain. PRN Zofran given x 1 this AM for nausea. Pt ambulated in room and halls. Will continue to monitor.

## 2021-03-25 ENCOUNTER — READMISSION MANAGEMENT (OUTPATIENT)
Dept: CALL CENTER | Facility: HOSPITAL | Age: 76
End: 2021-03-25

## 2021-03-25 VITALS
DIASTOLIC BLOOD PRESSURE: 75 MMHG | RESPIRATION RATE: 18 BRPM | HEART RATE: 63 BPM | HEIGHT: 71 IN | SYSTOLIC BLOOD PRESSURE: 134 MMHG | BODY MASS INDEX: 28 KG/M2 | WEIGHT: 200 LBS | TEMPERATURE: 98.1 F | OXYGEN SATURATION: 98 %

## 2021-03-25 LAB
BASOPHILS # BLD AUTO: 0.05 10*3/MM3 (ref 0–0.2)
BASOPHILS NFR BLD AUTO: 0.9 % (ref 0–1.5)
DEPRECATED RDW RBC AUTO: 38.1 FL (ref 37–54)
EOSINOPHIL # BLD AUTO: 0.32 10*3/MM3 (ref 0–0.4)
EOSINOPHIL NFR BLD AUTO: 5.5 % (ref 0.3–6.2)
ERYTHROCYTE [DISTWIDTH] IN BLOOD BY AUTOMATED COUNT: 12.3 % (ref 12.3–15.4)
HCT VFR BLD AUTO: 39.5 % (ref 37.5–51)
HGB BLD-MCNC: 13.6 G/DL (ref 13–17.7)
IMM GRANULOCYTES # BLD AUTO: 0.03 10*3/MM3 (ref 0–0.05)
IMM GRANULOCYTES NFR BLD AUTO: 0.5 % (ref 0–0.5)
LYMPHOCYTES # BLD AUTO: 1.8 10*3/MM3 (ref 0.7–3.1)
LYMPHOCYTES NFR BLD AUTO: 30.8 % (ref 19.6–45.3)
MCH RBC QN AUTO: 29.7 PG (ref 26.6–33)
MCHC RBC AUTO-ENTMCNC: 34.4 G/DL (ref 31.5–35.7)
MCV RBC AUTO: 86.2 FL (ref 79–97)
MONOCYTES # BLD AUTO: 0.65 10*3/MM3 (ref 0.1–0.9)
MONOCYTES NFR BLD AUTO: 11.1 % (ref 5–12)
NEUTROPHILS NFR BLD AUTO: 3 10*3/MM3 (ref 1.7–7)
NEUTROPHILS NFR BLD AUTO: 51.2 % (ref 42.7–76)
NRBC BLD AUTO-RTO: 0 /100 WBC (ref 0–0.2)
PLATELET # BLD AUTO: 173 10*3/MM3 (ref 140–450)
PMV BLD AUTO: 9.9 FL (ref 6–12)
RBC # BLD AUTO: 4.58 10*6/MM3 (ref 4.14–5.8)
WBC # BLD AUTO: 5.85 10*3/MM3 (ref 3.4–10.8)

## 2021-03-25 PROCEDURE — 25010000002 ONDANSETRON PER 1 MG: Performed by: INTERNAL MEDICINE

## 2021-03-25 PROCEDURE — 85025 COMPLETE CBC W/AUTO DIFF WBC: CPT | Performed by: INTERNAL MEDICINE

## 2021-03-25 RX ADMIN — APIXABAN 10 MG: 5 TABLET, FILM COATED ORAL at 08:23

## 2021-03-25 RX ADMIN — LISINOPRIL: 20 TABLET ORAL at 08:23

## 2021-03-25 RX ADMIN — ASPIRIN 81 MG: 81 TABLET, COATED ORAL at 08:23

## 2021-03-25 RX ADMIN — TAMSULOSIN HYDROCHLORIDE 0.4 MG: 0.4 CAPSULE ORAL at 08:23

## 2021-03-25 RX ADMIN — METOPROLOL SUCCINATE 50 MG: 50 TABLET, EXTENDED RELEASE ORAL at 08:23

## 2021-03-25 RX ADMIN — ONDANSETRON 4 MG: 2 INJECTION INTRAMUSCULAR; INTRAVENOUS at 02:20

## 2021-03-25 NOTE — PROGRESS NOTES
Case Management Discharge Note      Final Note: Home---no needs         Selected Continued Care - Discharged on 3/25/2021 Admission date: 3/22/2021 - Discharge disposition: Home or Self Care    Destination    No services have been selected for the patient.              Durable Medical Equipment    No services have been selected for the patient.              Dialysis/Infusion    No services have been selected for the patient.              Home Medical Care    No services have been selected for the patient.              Therapy    No services have been selected for the patient.              Community Resources    No services have been selected for the patient.                       Final Discharge Disposition Code: 01 - home or self-care

## 2021-03-25 NOTE — PLAN OF CARE
Goal Outcome Evaluation:  Plan of Care Reviewed With: patient  Progress: improving  Outcome Summary: Heparin drip cont. May poss be DC;d today.  Up ad francoise in halls and room.  IV Zofran x1 tonight for nausea. No emesis.   Next PTT due this am with labs.  To start eliquis today.

## 2021-03-25 NOTE — OUTREACH NOTE
Prep Survey      Responses   [facility] [place] patient discharged from?  [place]   Is LACE score < 7 ?  No   Emergency Room discharge w/ pulse ox?  No   [facility]    Date of Admission  03/22/21   Date of Discharge  03/18/21   Discharge Disposition  Home or Self Care   Discharge diagnosis  Epigastric pain    Does the patient have one of the following disease processes/diagnoses(primary or secondary)?  Other   Does the patient have Home health ordered?  No   Is there a DME ordered?  No   Prep survey completed?  Yes          Vita Stuart RN

## 2021-03-25 NOTE — DISCHARGE SUMMARY
Patient Name: Timi Wolf  : 1945  MRN: [id number]    Date of Admission: 3/22/2021  Date of Discharge:  3/25/2021  Primary Care Physician: Stanley Ruiz Jr., MD      Chief Complaint:   Abdominal Pain (Patient c/o Abdominal pain x 2 weeks, c/o Nausea and Diarrhea, current pain level is a 5 out of 10.)      Discharge Diagnoses     Active Hospital Problems    Diagnosis  POA   • **Epigastric pain [R10.13]  Yes   • Portal vein thrombosis [I81]  Unknown   • Hypercoagulable state (CMS/HCC) [D68.59]  Unknown   • CKD (chronic kidney disease) 3a [N18.9]  Unknown   • Hypertension [I10]  Yes   • Chronic coronary artery disease [I25.10]  Yes      Resolved Hospital Problems   No resolved problems to display.        Hospital Course     Mr. Wolf is a 75 y.o. male with a history of hypertension and chronic kidney disease who presented to [facility] initially complaining of abdominal pain and distention.  Please see the admitting history and physical for further details.  He was found to have portal vein thrombosis and was admitted to the hospital for further evaluation and treatment.  He was admitted to the hospital and seen and evaluated by oncology and general surgery.  General surgery felt most of his pain is related to the portal vein thrombosis and recommended against any further intervention or evaluation.  Oncology assisted with management throughout the hospitalization with regards to work-up for hypercoagulable state and anticoagulation for his blood clot.  He was started initially on a heparin drip and treated for 48 hours and then transition to direct oral anticoagulant at the time of discharge.  He is tolerated Eliquis with no bleeding or bruising.  Otherwise his vital signs and labs remained stable and the plan is to discharge home today and follow-up in the outpatient setting in a week or 2 for discussion of his lab results.  The plan was discussed with the patient at the bedside  acknowledged understanding and plans for discharge home today are agreeable.        Day of Discharge     Subjective:  He is feeling much better he still complaining of some bloating but his abdominal pain is much improved.  Denies new issues or complaints today and is agreeable with discharge from the hospital    Physical Exam:  Temp:  [97.8 °F (36.6 °C)-98.3 °F (36.8 °C)] 98.1 °F (36.7 °C)  Heart Rate:  [58-80] 63  Resp:  [18] 18  BP: (121-173)/(67-88) 134/75  Body mass index is 27.89 kg/m².  Physical Exam  Vitals and nursing note reviewed.   Constitutional:       Appearance: Normal appearance.   Cardiovascular:      Rate and Rhythm: Normal rate and regular rhythm.   Pulmonary:      Effort: No respiratory distress.      Breath sounds: Normal breath sounds.   Neurological:      General: No focal deficit present.      Mental Status: He is alert and oriented to person, place, and time.         Consultants     Consult Orders (all) (From admission, onward)     Start     Ordered    03/23/21 1314  Inpatient Hematology & Oncology Consult  Once     Specialty:  Hematology and Oncology  Provider:  Danie Magdaleno MD    03/23/21 1313    03/22/21 1723  Inpatient Hematology & Oncology Consult  Once     Specialty:  Hematology and Oncology  Provider:  Danie Magdaleno MD    03/22/21 1724    03/22/21 1409  [facility] (on-call MD unless specified) Details  Once     Specialty:  Hospitalist  Provider:  (Not yet assigned)    03/22/21 1408    03/22/21 1341  Surgery (on-call MD unless specified)  Once     Specialty:  General Surgery  Provider:  (Not yet assigned)    03/22/21 1340              Procedures     * Surgery not found *      Imaging Results (All)     Procedure Component Value Units Date/Time    CT Abdomen Pelvis With Contrast [[id number]] Collected: 03/22/21 1318     Updated: 03/22/21 1335    Narrative:      CT ABDOMEN PELVIS W CONTRAST-     HISTORY:  Abdominal pain for 2 weeks, mostly upper.      TECHNIQUE:  CT of the abdomen and  pelvis was performed following the  administration of intravenous contrast. Radiation dose reduction  techniques were utilized, including automated exposure control and  exposure modulation based on body size.     COMPARISON:  Gallbladder ultrasound 14 2015. CT abdomen with contrast  03/06/2009.     FINDINGS:  Heart size is normal. There is no pericardial effusion. There is mild  bibasilar atelectasis and/or scarring. Pleural spaces are clear.  The liver is normal in size. There is a heterogeneous appearance of the  hepatic parenchyma with a focal area of ill-defined area of  hypoattenuation to the right of the falciform ligament. The gallbladder  is present. The pancreas, spleen, and adrenal glands are within normal  limits. There is a 1.5 cm fluid density left renal lesion, which likely  represents a cyst. A hypodense focus in the superior pole of the right  kidney is too small to accurately characterize. There are renal sinus  cysts on the left. There is no hydronephrosis. No pathologically  enlarged abdominal or pelvic lymph nodes are identified.   There is mild mesenteric edema and small volume free fluid. There is  extensive calcific aortoiliac atherosclerosis. There is complete  occlusion of the main portal vein through the portal confluence. There  is occlusion of portal vein branches bilaterally. There is complete  occlusion of the majority of the splenic vein. There is extension of  thrombus into the superior mesenteric vein and multiple distal branches.     There is a tiny hiatal hernia. There is no bowel obstruction. The  appendix is present and normal in size. There is scattered colonic  diverticula without CT evidence of acute diverticulitis. There are no  dilated bowel loops. There are no focally thickened hyperenhancing  segments of small bowel or colon. There is fecalization of small bowel  loops, which can reflect slow transit. There is no definite evidence of  pneumatosis.  The bladder is mildly  distended and within normal limits. The prostate  is present.  There is multilevel degenerative disc disease. There is diffuse osseous  demineralization.       Impression:         1.  Extensive thrombus involving the majority of the portal venous  system, as detailed above. No dilated or focally thickened  hyperenhancing loops of bowel and colon are identified. There is some  fecalization of small bowel loops, which can reflect slow transit, with  no definite evidence of pneumatosis.  2.  Patchy attenuation of the liver is likely perfusional. There may be  component of geographic steatosis. Additionally, given the extent of  portal vein thrombus, developing hepatic infarction cannot be excluded.  Attention on follow-up imaging is recommended.     Discussed with Dr. Muniz at 1:28 PM.     This report was finalized on 3/22/2021 1:32 PM by Dr. Judith Engel M.D.       XR Chest 1 View [[id number]] Collected: 03/22/21 1243     Updated: 03/22/21 1247    Narrative:      XR CHEST 1 VW-     HISTORY: Male who is 75 years-old,  abdominal pain     TECHNIQUE: Frontal view of the chest     COMPARISON: None available     FINDINGS: The heart size is borderline. Aorta is tortuous. Pulmonary  vasculature is unremarkable. No focal pulmonary consolidation, pleural  effusion, or pneumothorax. No acute osseous process.       Impression:      No focal pulmonary consolidation. Borderline heart size.  Tortuous aorta. Follow-up as clinically indicated.     This report was finalized on 3/22/2021 12:44 PM by Dr. Bk Cortes M.D.               Pertinent Labs     Results from last 7 days   Lab Units 03/25/21  0525 03/24/21  0651 03/23/21  0437 03/22/21  1528   WBC 10*3/mm3 5.85 5.14 6.02 5.47   HEMOGLOBIN g/dL 13.6 12.8* 13.3 14.8   PLATELETS 10*3/mm3 173 157 160 182     Results from last 7 days   Lab Units 03/23/21  0437 03/22/21  1143   SODIUM mmol/L 138 137   POTASSIUM mmol/L 3.7 3.8   CHLORIDE mmol/L 109* 106   CO2 mmol/L 22.2 22.5    BUN mg/dL 22 26*   CREATININE mg/dL 1.15 1.36*   GLUCOSE mg/dL 83 110*   Estimated Creatinine Clearance: 65.9 mL/min (by C-G formula based on SCr of 1.15 mg/dL).  Results from last 7 days   Lab Units 03/23/21  1503 03/23/21  0437 03/22/21  1143   ALBUMIN g/dL 3.3 3.10* 3.60   BILIRUBIN mg/dL  --  0.6 0.5   ALK PHOS U/L  --  81 94   AST (SGOT) U/L  --  17 17   ALT (SGPT) U/L  --  17 17     Results from last 7 days   Lab Units 03/23/21  1503 03/23/21  0437 03/22/21  1143   CALCIUM mg/dL  --  8.6 9.5   ALBUMIN g/dL 3.3 3.10* 3.60     Results from last 7 days   Lab Units 03/22/21  1143   LIPASE U/L 51     Results from last 7 days   Lab Units 03/22/21  1143   TROPONIN T ng/mL <0.010           Invalid input(s): LDLCALC      Results from last 7 days   Lab Units 03/22/21  1534   COVID19  Not Detected       Test Results Pending at Discharge     Pending Labs     Order Current Status    Antithrombin III In process    Beta-2 Glycoprotein Antibodies In process    Lupus Anticoagulant In process    Protein C Activity In process    Protein S Antigen, Free In process    Protein S Functional In process          Discharge Details        Discharge Medications      New Medications      Instructions Start Date   Eliquis DVT/PE Starter Pack tablet therapy pack  Generic drug: Apixaban Starter Pack   Take two 5 mg tablets by mouth every 12 hours for 7 days. Followed by one 5 mg tablet every 12 hours.      apixaban 5 MG tablet tablet  Commonly known as: ELIQUIS   5 mg, Oral, Every 12 Hours Scheduled         Continue These Medications      Instructions Start Date   amLODIPine-benazepril 5-20 MG per capsule  Commonly known as: LOTREL 5-20   TAKE 1 CAPSULE DAILY      aspirin 81 MG tablet   81 mg, Oral, Daily      Coenzyme Q10 150 MG capsule   1 tablet, Oral, Nightly      Melatonin 3 MG capsule   1 capsule, Oral, Nightly      metoprolol succinate XL 50 MG 24 hr tablet  Commonly known as: TOPROL-XL   TAKE 1 TABLET DAILY      pravastatin 80 MG  tablet  Commonly known as: PRAVACHOL   TAKE 1 TABLET EVERY NIGHT      tamsulosin 0.4 MG capsule 24 hr capsule  Commonly known as: FLOMAX   TAKE 1 CAPSULE DAILY         Stop These Medications    hydroCHLOROthiazide 12.5 MG capsule  Commonly known as: MICROZIDE            Allergies   Allergen Reactions   • Sulfamethoxazole-Trimethoprim Itching     Abdominal pain as well          Discharge Disposition:  Home or Self Care    Discharge Diet:  Diet Order   Procedures   • Diet Regular       Discharge Activity:   Activity Instructions     Activity as Tolerated            CODE STATUS:    Code Status and Medical Interventions:   Ordered at: 03/22/21 1724     Code Status:    CPR     Medical Interventions (Level of Support Prior to Arrest):    Full       Future Appointments   Date Time Provider Department Center   4/16/2021  3:10 PM LAB CHAIR [facility]  [facility] [place]   4/16/2021  3:40 PM Danie Magdaleno MD [facility] [place]   11/19/2021 11:30 AM [facility]     Additional Instructions for the Follow-ups that You Need to Schedule     Discharge Follow-up with PCP   As directed       Currently Documented PCP:    Stanley Ruiz Jr., MD    PCP Phone Number:    [phone]     Follow Up Details: 2 weeks         Discharge Follow-up with Specified Provider: Dr Magdaleno; 2 Weeks   As directed      To: Dr Magdaleno    Follow Up: 2 Weeks           Follow-up Information     Stanley Ruiz Jr., MD .    Specialty: Family Medicine  Why: 2 weeks  Contact information:  [address] [place] [address]  [phone]                   Additional Instructions for the Follow-ups that You Need to Schedule     Discharge Follow-up with PCP   As directed       Currently Documented PCP:    Stanley Ruiz Jr., MD    PCP Phone Number:    [phone]     Follow Up Details: 2 weeks         Discharge Follow-up with Specified Provider: Dr Magdaleno; 2 Weeks   As directed      To: Dr Magdaleno    Follow Up: 2 Weeks            Time Spent on Discharge:  Greater than 30 minutes      Brandan Jerry MD  [facility]  03/25/21  10:42 EDT

## 2021-03-26 ENCOUNTER — TRANSITIONAL CARE MANAGEMENT TELEPHONE ENCOUNTER (OUTPATIENT)
Dept: CALL CENTER | Facility: HOSPITAL | Age: 76
End: 2021-03-26

## 2021-03-26 LAB
AT III PPP CHRO-ACNC: 69 % (ref 90–134)
B2 GLYCOPROT1 IGA SER-ACNC: <9 GPI IGA UNITS (ref 0–25)
B2 GLYCOPROT1 IGG SER-ACNC: <9 GPI IGG UNITS (ref 0–20)
B2 GLYCOPROT1 IGM SER-ACNC: 44 GPI IGM UNITS (ref 0–32)
PROT C ACT/NOR PPP: 80 % (ref 86–163)
PROT S ACT/NOR PPP: 71 % (ref 70–127)
PROT S FREE PPP-ACNC: 88 % (ref 49–138)

## 2021-03-26 NOTE — OUTREACH NOTE
Call Center TCM Note      Responses   [facility] patient discharged from?  [place]   Does the patient have one of the following disease processes/diagnoses(primary or secondary)?  Other   TCM attempt successful?  Yes   Call start time  1702   Call end time  1704   Discharge diagnosis  Epigastric pain    Meds reviewed with patient/caregiver?  Yes   Is the patient having any side effects they believe may be caused by any medication additions or changes?  No   Does the patient have all medications ordered at discharge?  Yes   Is the patient taking all medications as directed (includes completed medication regime)?  Yes   Does the patient have a primary care provider?   Yes   Does the patient have an appointment with their PCP within 7 days of discharge?  N/A   Has the patient kept scheduled appointments due by today?  N/A   Psychosocial issues?  No   Did the patient receive a copy of their discharge instructions?  Yes   Nursing interventions  Reviewed instructions with patient   What is the patient's perception of their health status since discharge?  Improving   Is the patient/caregiver able to teach back the hierarchy of who to call/visit for symptoms/problems? PCP, Specialist, Home health nurse, Urgent Care, ED, 911  Yes   TCM call completed?  Yes   Wrap up additional comments  States he is doing well and is following his discharge instructions closely, states he needs to have a f/u made at PCP office with another doctor since Dr. Ruiz is not available.          Adela Cuevas RN    3/26/2021, 17:05 EDT

## 2021-03-30 LAB
APTT HEX PL PPP: 7 SEC (ref 0–11)
APTT SCREEN TO CONFIRM RATIO: 0.92 RATIO (ref 0–1.4)
CONFIRM APTT/NORMAL: 46.9 SEC (ref 0–55)
DRVVT SCREEN TO CONFIRM RATIO: 1.5 RATIO (ref 0.8–1.2)
LA 2 SCREEN W REFLEX-IMP: ABNORMAL
MIXING APTT: 60.6 SEC (ref 0–48.9)
MIXING DRVVT: 45.7 SEC (ref 0–40.4)
SCREEN APTT: 63.3 SEC (ref 0–51.9)
SCREEN DRVVT: 56.5 SEC (ref 0–47)
THROMBIN TIME: 43.3 SEC (ref 0–23)
TT IMM NP PPP: 29.6 SEC (ref 0–23)
TT P HPASE PPP: 16.7 SEC (ref 0–23)

## 2021-04-02 ENCOUNTER — OFFICE VISIT (OUTPATIENT)
Dept: INTERNAL MEDICINE | Facility: CLINIC | Age: 76
End: 2021-04-02

## 2021-04-02 VITALS
TEMPERATURE: 98.2 F | SYSTOLIC BLOOD PRESSURE: 142 MMHG | WEIGHT: 209 LBS | DIASTOLIC BLOOD PRESSURE: 74 MMHG | OXYGEN SATURATION: 97 % | HEIGHT: 71 IN | BODY MASS INDEX: 29.26 KG/M2 | HEART RATE: 51 BPM

## 2021-04-02 DIAGNOSIS — D68.59 HYPERCOAGULABLE STATE (HCC): ICD-10-CM

## 2021-04-02 DIAGNOSIS — I81 PORTAL VEIN THROMBOSIS: Primary | ICD-10-CM

## 2021-04-02 PROCEDURE — 1111F DSCHRG MED/CURRENT MED MERGE: CPT | Performed by: FAMILY MEDICINE

## 2021-04-02 PROCEDURE — 99495 TRANSJ CARE MGMT MOD F2F 14D: CPT | Performed by: FAMILY MEDICINE

## 2021-04-02 NOTE — PROGRESS NOTES
"Chief Complaint  Hospital Follow Up Visit (PVT follow up)    Subjective          Timi Wolf presents to [facility] PRIMARY CARE  Transitional Care Management Certification  I certify that the following are true:  Communication was made within 2 business days of discharge.  Complexity of Medical Decision Making is high.  Face to face visit occurred within 8 days.    *Note: 88501 is for high complexity patients with a face to face visit within 7 days of discharge.  03518 is for high complexity patients with a face to face on days 8-14 post discharge or medium complexity with face to face visit within 14 days post discharge.    Patient is up-to-date on COVID-19 vaccination second vaccination occurring February 23.  Patient was admitted to the hospital March 22 with abdominal pain splenectomy portal vein thrombosis of unspecific origin etiology.  Hypercoagulable work-up is underway with Dr. Arndt knee is on Eliquis for the time being and probably long-term.  Again precise etiology is uncertain radiographically.    Current outpatient and discharge medications have been reconciled for the patient.  Reviewed by: Stanley Ruiz Jr., MD        Objective   Vital Signs:   /74   Pulse 51   Temp 98.2 °F (36.8 °C)   Ht 180.3 cm (71\")   Wt 94.8 kg (209 lb)   SpO2 97%   BMI 29.15 kg/m²     Physical Exam  Vitals reviewed.   Constitutional:       Appearance: He is well-developed.   HENT:      Head: Normocephalic and atraumatic.      Right Ear: Tympanic membrane and external ear normal.      Left Ear: Tympanic membrane and external ear normal.   Eyes:      Conjunctiva/sclera: Conjunctivae normal.      Pupils: Pupils are equal, round, and reactive to light.   Neck:      Thyroid: No thyromegaly.      Vascular: No JVD.   Cardiovascular:      Rate and Rhythm: Normal rate and regular rhythm.      Heart sounds: Normal heart sounds.   Pulmonary:      Effort: Pulmonary effort is normal.      Breath sounds: " Normal breath sounds.   Abdominal:      General: Bowel sounds are normal.      Palpations: Abdomen is soft.   Musculoskeletal:         General: Normal range of motion.      Cervical back: Normal range of motion and neck supple.   Lymphadenopathy:      Cervical: No cervical adenopathy.   Skin:     General: Skin is warm and dry.      Findings: No rash.   Neurological:      Mental Status: He is alert and oriented to person, place, and time.      Cranial Nerves: No cranial nerve deficit.      Coordination: Coordination normal.   Psychiatric:         Behavior: Behavior normal.         Thought Content: Thought content normal.         Judgment: Judgment normal.        Result Review :   The following data was reviewed by: Stanley Ruiz Jr., MD on 04/02/2021:  Common labs    Common Labsle 3/23/21 3/23/21 3/23/21 3/24/21 3/25/21    0437 0437 1503     Glucose 83       BUN 22       Creatinine 1.15       eGFR Non African Am 62       Sodium 138       Potassium 3.7       Chloride 109 (A)       Calcium 8.6       Total Protein   6.2     Albumin 3.10 (A)  3.3     Total Bilirubin 0.6       Alkaline Phosphatase 81       AST (SGOT) 17       ALT (SGPT) 17       WBC  6.02  5.14 5.85   Hemoglobin  13.3  12.8 (A) 13.6   Hematocrit  37.0 (A)  38.1 39.5   Platelets  160  157 173   (A) Abnormal value            Data reviewed: Radiologic studies CT abdomen pelvis          Assessment and Plan    Diagnoses and all orders for this visit:    1. Portal vein thrombosis (Primary)    2. Hypercoagulable state (CMS/HCC)        Follow Up   No follow-ups on file.  Patient was given instructions and counseling regarding his condition or for health maintenance advice. Please see specific information pulled into the AVS if appropriate.

## 2021-04-05 ENCOUNTER — READMISSION MANAGEMENT (OUTPATIENT)
Dept: CALL CENTER | Facility: HOSPITAL | Age: 76
End: 2021-04-05

## 2021-04-05 NOTE — OUTREACH NOTE
Medical Week 2 Survey      Responses   [facility] patient discharged from?  [place]   Does the patient have one of the following disease processes/diagnoses(primary or secondary)?  Other   Week 2 attempt successful?  Yes   Call start time  1533   Discharge diagnosis  Epigastric pain    Is patient permission given to speak with other caregiver?  Yes   List who call center can speak with  wife   Person spoke with today (if not patient) and relationship  wife   Meds reviewed with patient/caregiver?  Yes   Is the patient taking all medications as directed (includes completed medication regime)?  Yes   Has the patient kept scheduled appointments due by today?  Yes   What is the patient's perception of their health status since discharge?  Improving   If the patient is a current smoker, are they able to teach back resources for cessation?  Not a smoker   Week 2 Call Completed?  Yes   Graduated  Yes   Is the patient interested in additional calls from an ambulatory ?  NOTE:  applies to high risk patients requiring additional follow-up.  No   Did the patient feel the follow up calls were helpful during their recovery period?  Yes   Was the number of calls appropriate?  Yes   Graduated/Revoked comments  Doing well.  No problems or questions at this time.          Viri Coley RN

## 2021-04-14 ENCOUNTER — TELEPHONE (OUTPATIENT)
Dept: ONCOLOGY | Facility: CLINIC | Age: 76
End: 2021-04-14

## 2021-04-14 NOTE — TELEPHONE ENCOUNTER
Caller: Timi Wolf    Relationship: Self    Best call back number: [phone]    What is the best time to reach you: ANY    Who are you requesting to speak with: UZAIR.    Do you know the name of the person who called: TIMI    What was the call regarding: PATIENT IS REQUESTING THAT HIS WIFE CAN JOIN HIM FOR HIS HOSP. FOLLOW UP APPT. ON 4/16/2021, PLEASE CALL PATIENT TO ADVISE.    Do you require a callback: THANK YOU

## 2021-04-16 ENCOUNTER — OFFICE VISIT (OUTPATIENT)
Dept: ONCOLOGY | Facility: CLINIC | Age: 76
End: 2021-04-16

## 2021-04-16 ENCOUNTER — LAB (OUTPATIENT)
Dept: LAB | Facility: HOSPITAL | Age: 76
End: 2021-04-16

## 2021-04-16 VITALS
HEART RATE: 61 BPM | OXYGEN SATURATION: 97 % | DIASTOLIC BLOOD PRESSURE: 70 MMHG | RESPIRATION RATE: 19 BRPM | WEIGHT: 206.3 LBS | HEIGHT: 71 IN | TEMPERATURE: 97.8 F | SYSTOLIC BLOOD PRESSURE: 144 MMHG | BODY MASS INDEX: 28.88 KG/M2

## 2021-04-16 DIAGNOSIS — R93.5 ABNORMAL FINDINGS ON DIAGNOSTIC IMAGING OF OTHER ABDOMINAL REGIONS, INCLUDING RETROPERITONEUM: ICD-10-CM

## 2021-04-16 DIAGNOSIS — D68.59 HYPERCOAGULABLE STATE (HCC): Primary | ICD-10-CM

## 2021-04-16 DIAGNOSIS — R79.9 ABNORMAL FINDING OF BLOOD CHEMISTRY, UNSPECIFIED: ICD-10-CM

## 2021-04-16 DIAGNOSIS — I81 PORTAL VEIN THROMBOSIS: ICD-10-CM

## 2021-04-16 DIAGNOSIS — N40.0 BENIGN PROSTATIC HYPERPLASIA, UNSPECIFIED WHETHER LOWER URINARY TRACT SYMPTOMS PRESENT: ICD-10-CM

## 2021-04-16 DIAGNOSIS — D69.6 THROMBOCYTOPENIA (HCC): ICD-10-CM

## 2021-04-16 LAB
ALBUMIN SERPL-MCNC: 3.9 G/DL (ref 3.5–5.2)
ALBUMIN/GLOB SERPL: 1.6 G/DL (ref 1.1–2.4)
ALP SERPL-CCNC: 79 U/L (ref 38–116)
ALPHA-FETOPROTEIN: 3.18 NG/ML (ref 0–8.3)
ALT SERPL W P-5'-P-CCNC: 18 U/L (ref 0–41)
ANION GAP SERPL CALCULATED.3IONS-SCNC: 9.3 MMOL/L (ref 5–15)
AST SERPL-CCNC: 19 U/L (ref 0–40)
BASOPHILS # BLD AUTO: 0.05 10*3/MM3 (ref 0–0.2)
BASOPHILS NFR BLD AUTO: 0.9 % (ref 0–1.5)
BILIRUB SERPL-MCNC: 0.6 MG/DL (ref 0.2–1.2)
BUN SERPL-MCNC: 22 MG/DL (ref 6–20)
BUN/CREAT SERPL: 16.3 (ref 7.3–30)
CALCIUM SPEC-SCNC: 9.9 MG/DL (ref 8.5–10.2)
CHLORIDE SERPL-SCNC: 109 MMOL/L (ref 98–107)
CO2 SERPL-SCNC: 22.7 MMOL/L (ref 22–29)
CREAT SERPL-MCNC: 1.35 MG/DL (ref 0.7–1.3)
DEPRECATED RDW RBC AUTO: 43.4 FL (ref 37–54)
EOSINOPHIL # BLD AUTO: 0.23 10*3/MM3 (ref 0–0.4)
EOSINOPHIL NFR BLD AUTO: 4 % (ref 0.3–6.2)
ERYTHROCYTE [DISTWIDTH] IN BLOOD BY AUTOMATED COUNT: 13.5 % (ref 12.3–15.4)
GFR SERPL CREATININE-BSD FRML MDRD: 52 ML/MIN/1.73
GLOBULIN UR ELPH-MCNC: 2.4 GM/DL (ref 1.8–3.5)
GLUCOSE SERPL-MCNC: 106 MG/DL (ref 74–124)
HCT VFR BLD AUTO: 41.1 % (ref 37.5–51)
HGB BLD-MCNC: 14.1 G/DL (ref 13–17.7)
IMM GRANULOCYTES # BLD AUTO: 0.01 10*3/MM3 (ref 0–0.05)
IMM GRANULOCYTES NFR BLD AUTO: 0.2 % (ref 0–0.5)
LYMPHOCYTES # BLD AUTO: 1.86 10*3/MM3 (ref 0.7–3.1)
LYMPHOCYTES NFR BLD AUTO: 32.6 % (ref 19.6–45.3)
MCH RBC QN AUTO: 30.4 PG (ref 26.6–33)
MCHC RBC AUTO-ENTMCNC: 34.3 G/DL (ref 31.5–35.7)
MCV RBC AUTO: 88.6 FL (ref 79–97)
MONOCYTES # BLD AUTO: 0.74 10*3/MM3 (ref 0.1–0.9)
MONOCYTES NFR BLD AUTO: 13 % (ref 5–12)
NEUTROPHILS NFR BLD AUTO: 2.82 10*3/MM3 (ref 1.7–7)
NEUTROPHILS NFR BLD AUTO: 49.3 % (ref 42.7–76)
NRBC BLD AUTO-RTO: 0 /100 WBC (ref 0–0.2)
PLATELET # BLD AUTO: 106 10*3/MM3 (ref 140–450)
PLATELETS.RETICULATED NFR BLD AUTO: 2.9 % (ref 0.9–6.5)
PMV BLD AUTO: 10.4 FL (ref 6–12)
POTASSIUM SERPL-SCNC: 4 MMOL/L (ref 3.5–4.7)
PROT SERPL-MCNC: 6.3 G/DL (ref 6.3–8)
RBC # BLD AUTO: 4.64 10*6/MM3 (ref 4.14–5.8)
SODIUM SERPL-SCNC: 141 MMOL/L (ref 134–145)
WBC # BLD AUTO: 5.71 10*3/MM3 (ref 3.4–10.8)

## 2021-04-16 PROCEDURE — 82105 ALPHA-FETOPROTEIN SERUM: CPT | Performed by: INTERNAL MEDICINE

## 2021-04-16 PROCEDURE — 80053 COMPREHEN METABOLIC PANEL: CPT

## 2021-04-16 PROCEDURE — 99214 OFFICE O/P EST MOD 30 MIN: CPT | Performed by: INTERNAL MEDICINE

## 2021-04-16 PROCEDURE — 36415 COLL VENOUS BLD VENIPUNCTURE: CPT

## 2021-04-16 PROCEDURE — 85055 RETICULATED PLATELET ASSAY: CPT | Performed by: INTERNAL MEDICINE

## 2021-04-16 PROCEDURE — 85025 COMPLETE CBC W/AUTO DIFF WBC: CPT

## 2021-04-16 NOTE — PROGRESS NOTES
Subjective         DURING THE VISIT WITH THE PATIENT TODAY , PATIENT HAD FACE MASK, MY MEDICAL ASSISTANT AND I  HAD PROPPER PROTECTIVE EQUIPMENT, AND I DID HAND HYGIENE WITH SOAP AND WATER BEFORE AND AFTER THE VISIT.     REASONS FOR FOLLOWUP: pylephlebitis, FACTOR V LEIDEN MUTATION, ANTIGLYCOPROTEIN ANTIBODY POSITIVE     HISTORY OF PRESENT ILLNESS:  The patient is a 75 y.o. year old male  who is here for follow-up with the above-mentioned history.  This patient returns today to the office for followup after I saw him in consultation at [facility] almost a month ago when he was admitted with abdominal pain of almost 2 weeks of evolution in the epigastric area that became persistent. After seeing medical facilities he failed to improve and he had no definitive diagnosis and was brought to the emergency room at [facility] where a CT scan of the abdomen documented thrombosis of the portal vein. He had no tumoral lesions in the liver or in the abdominal cavity otherwise. He was admitted and anticoagulated with heparin and subsequently upon discharge given Eliquis. Since then the patient has had near complete resolution of his abdominal pain. He has very occasional nausea. No distention. No vomiting. No jaundice. His bowel activity is normal. Urination is normal. He has episodes of interstitial cystitis time to time. He has not had any other episodes recently. He has not had any fever or infection. He has not had any clinical bleeding. He feels very good. He has not had any other issues in his lower extremities. He is happy to be at home helping take care of his wife who requires total care from him.        Past Medical History:   Diagnosis Date   • Abdominal pain    • Arteriosclerotic cardiovascular disease    • Diverticulosis    • Dysphagia    • GERD (gastroesophageal reflux disease)    • H/O Interstitial cystitis    • Hyperlipidemia    • Hypertension    • Internal hemorrhoids    •  Prostate enlargement    • Renal cyst, right    • Umbilical hernia         Past Surgical History:   Procedure Laterality Date   • ACHILLES TENDON REPAIR      ruptured tendon   • COLONOSCOPY N/A 2012    Dr Alvarado/[facility]   • SHOULDER SURGERY     • TONSILLECTOMY          Current Outpatient Medications on File Prior to Visit   Medication Sig Dispense Refill   • amLODIPine-benazepril (LOTREL 5-20) 5-20 MG per capsule TAKE 1 CAPSULE DAILY (Patient taking differently: Take 1 capsule by mouth Daily.) 90 capsule 3   • apixaban (ELIQUIS) 5 MG tablet tablet Take 1 tablet by mouth Every 12 (Twelve) Hours. 60 tablet 2   • Apixaban Starter Pack tablet therapy pack Take two 5 mg tablets by mouth every 12 hours for 7 days. Followed by one 5 mg tablet every 12 hours. 74 tablet 0   • aspirin 81 MG tablet Take 81 mg by mouth daily.     • Coenzyme Q10 150 MG capsule Take 1 tablet by mouth Every Night.     • Melatonin 3 MG capsule Take 1 capsule by mouth Every Night.     • metoprolol succinate XL (TOPROL-XL) 50 MG 24 hr tablet TAKE 1 TABLET DAILY (Patient taking differently: Take 50 mg by mouth Daily.) 90 tablet 1   • pravastatin (PRAVACHOL) 80 MG tablet TAKE 1 TABLET EVERY NIGHT (Patient taking differently: Take 80 mg by mouth Every Night.) 90 tablet 1   • tamsulosin (FLOMAX) 0.4 MG capsule 24 hr capsule TAKE 1 CAPSULE DAILY (Patient taking differently: Take 1 capsule by mouth Daily.) 90 capsule 3     No current facility-administered medications on file prior to visit.        ALLERGIES:    Allergies   Allergen Reactions   • Sulfamethoxazole-Trimethoprim Itching     Abdominal pain as well         Social History     Socioeconomic History   • Marital status:      Spouse name: Amarilys   • Number of children: Not on file   • Years of education: Not on file   • Highest education level: Not on file   Tobacco Use   • Smoking status: Former Smoker   • Smokeless tobacco: Never Used   Substance and Sexual Activity   • Alcohol use: No   • Drug  use: No   • Sexual activity: Defer        Family History   Problem Relation Age of Onset   • Heart attack Father    • Heart disease Father    • Hypertension Mother         HEMATOLOGY HISTORY:  75-year-old white male who has had abdominal pain for almost 1 month in the right upper quadrant and has been progressively worse and becoming almost epigastric, especially in the morning with an intensity of 7 out of 10. He has occasional nausea but no vomiting. He has sensation of distended abdomen. He has not had excessive burping or belching and neither flatulence. He has been having normal bowel activity with no passage of blood in the stool. No diarrhea. He has not developed any jaundice. Urination has remained normal. He has had minimal swelling in his lower extremities and he states his feet remain cold all the time. Given the persistency of his symptoms and given the fact that he was seen at an urgent care center at [facility] and advised to be seen by Gastroenterology at [place] and given an appointment for 6 weeks, he decided to come to the emergency room. At the time of the emergency room visit, a CT scan of the abdomen was done that documented extensive thrombosis of the portal vein. The extension of the thrombosis was into the splenic vein into the superior mesenteric vein. The patient was placed immediately on heparin. Since then the symptoms have improved some. He has no nausea or vomiting this morning. His abdomen still remains kind of distended but he has had good bowel activity this morning with no passage of blood in the stool. He has not had any fever, chills or sweats. He denies any cough, sputum production or shortness of breath. He has no bone pain. No joint pain. No rash in the skin. No alopecia. No sores in his mouth and no lesions in the skin otherwise like livedo reticularis. The patient denies any neurological symptoms. He denies any changes in performance status. His weight has remained  stable. He is very much indispensable at home. His wife has multiple comorbidities and he is the only one that is able to handle her.    THROMBOPHILIA  CLINICAL AND LABORATORY REPORT:  DATE: 4/ /16 /21     DIAGNOSIS: PYLEPHLEBITIS      KEY: P EQUAL TO POSITIVE  , N  EQUAL TO NEGATIVE.    PREPARED BY SENTHIL KELLEY MD STAYS IN THE CHART PERMANENTLY      CLINICAL FACTORS:     OBESITY: ___Y___     DIABETES: __N____.    IMMOBILITY: __N____.    SMOKING HISTORY : ____N________ PACK A DAY/ YEARS ________    LONG TRIPS BY CAR OR AIRPLANE: ___N_____    RECENT SURGERY: ___N_______   LOCATION: ________    TRAUMA: ____N____    MEDICATIONS:   BIRTH CONTROL MEDICATIONS ____N____ ANDROGENS _N______ ESTROGENS __N______ HEPARIN N________ OTHERS ____N___    PREGNANCY:   ____N____.    FAMILY HISTORY OF BLOOD CLOTHS:    POSITIVE:   ________ NEGATIVE __N_____    HEMATOLOGIC DISORDER:  POLYCYTHEMIA VERA ___N____ THROMBOCYTOSIS _N_____    DIC:___N_____  TTP ___N_______    COLLAGEN VASCULAR DISEASE: __N___   DIAGNOSIS: ________________________.    VASCULITIS: TYPE AND LOCATION ____N___________ BIOPSY PROVEN:____________    CANCER DIAGNOSIS: ___N____    TYPE ________    CANCER SCREENING:  BREAST _______ COLON ___Y_____ LUNG ________ GENITOURINARY ___Y______   CT SCANS _____Y______ NORMAL ______ ABNORMAL ________    INDWELLING VASCULAR DEVICE:  ____N_____. LOCATION _________    INFLAMMATORY BOWEL DISEASE: ULCERATIVE COLITIS ______N____   CHRON'S DISEASE ___________    RECENT COVID 19 INFECTION ______N_________.          LABORATORY:    FACTOR V LEIDEN MUTATION: POSITIVE   _Y____ NEGATIVE _____ HETEROZYGOUS __Y______ HOMOZYGOUS ________    PROTHROMBIN GENE MUTATION:  POSITIVE _____ NEGATIVE _N____    ANTITHROMBIN III : NORMAL ___N__   ABNORMAL ______ QUANTIFICATION: _______    PROTEIN S ANTIGEN _____N_____ PROTEIN S FUNCTIONAL ___N_____ PROTEIN C _N________     LUPUS ANTICOAGULANT: POSITIVE _____  NEGATIVE _N____. REPEATED  6 WEEKS LATER  "______    ANTICARDIOLIPIN ANTIBODY PROFILE : POSITIVE ______  NEGATIVE _N_____  IGG _____ IGM _____ REPEATED 6 WEEKS LATER _________    ANTI GLYCOPROTEIN ANTIBODY:  POSITIVE __Y___  NEGATIVE  _______   IGG _____ IGM _Y/19____ REPEATED 6 WEEKS LATER ________    ANTIPHOSPATIDIL SERINE ANTIBODY:  POSITIVE _____ NEGATIVE _______    SERUM PROTEIN ELECTROPHORESIS AND IMMUNOFIXATION: NO MONOCLONAL PROTEIN __N______ POSITIVE MONOCLONAL PROTEIN ______ TYPE _________    C REACTIVE PROTEIN HIGH SENSIBILITY: NORMAL __Y_____ ABNORMAL :QUANTIFICATION ________    SEDIMENTATION RATE: _____ MM/H.    FIBRINOGEN LEVEL: _________ NORMAL _______  ABNORMAL _Y/465_________.    LIPID PROFILE:    CHOLESTEROL HIGH ______Y____  TRYGLYCERIDES HIGH  __Y______    HEPARIN ASSOCIATED ANTIPLATELET ANTIBODY: _NA________        Objective     Vitals:    04/16/21 1530   BP: 144/70   Pulse: 61   Resp: 19   Temp: 97.8 °F (36.6 °C)   TempSrc: Temporal   SpO2: 97%   Weight: 93.6 kg (206 lb 4.8 oz)   Height: 180.3 cm (70.98\")   PainSc: 0-No pain     Current Status 4/16/2021   ECOG score 0       Physical Exam  I HAVE PERSONALLY REVIEWED THE HISTORY OF THE PRESENT ILLNESS, PAST MEDICAL HISTORY, FAMILY HISTORY, SOCIAL HISTORY, ALLERGIES, MEDICATIONS STATED ABOVE IN THE OFFICE NOTE FROM TODAY.        GENERAL:  Well-developed, well-nourished  Patient  in no acute distress.   SKIN:  Warm, dry ,NO rashes,NO purpura ,NO petechiae.  HEENT:  Pupils were equal and reactive to light and accomodation, conjunctivae noninjected, no pterygium, normal extraocular movements, normal visual acuity.   NECK:  Supple with good range of motion; no thyromegaly or masses, no JVD or bruits, no cervical adenopathies.No carotid artery pain, no carotid abnormal pulsation , NO arterial dance.  LYMPHATICS:  No cervical, NO supraclavicular, NO axillary,NO epitrochlear , NO inguinal adenopathy.  CARDIAC   normal rate and regular rhythm, without murmur,NO rubs NO S3 NO S4 right or left . " Normal femoral, popliteal, pedis, brachial and carotid pulses.  VASCULAR ARTERIAL: normal carotids,brachial,radial,femoral,popliteal, pedis pulses , no bruits.no paleness or cyanosis, no pain, no edema, no numbness, no gangrene.  VASCULAR VENOUS: no cyanosis, collateral circulation, varicosities, edema, palpable cords, pain, erythema.  ABDOMEN:  Soft, nontender with no hepatomegaly, no splenomegaly,no masses, no ascites, no collateral circulation,no distention,no Dereck sign, no abdominal pain, no inguinal hernias,no umbilical hernia, no abdominal bruits. Normal bowel sounds.  GENITAL: Not  Performed.  EXTREMITIES  AND SPINE:  No clubbing, cyanosis or edema, no deformities or pain .No kyphosis, scoliosis, deformities or pain in spine, ribs or pelvic bone.  NEUROLOGICAL:  Patient was awake, alert, oriented to time, person and place.        RECENT LABS:  Hematology WBC   Date Value Ref Range Status   04/16/2021 5.71 3.40 - 10.80 10*3/mm3 Final   11/18/2020 6.55 3.40 - 10.80 10*3/mm3 Final     RBC   Date Value Ref Range Status   04/16/2021 4.64 4.14 - 5.80 10*6/mm3 Final   11/18/2020 5.15 4.14 - 5.80 10*6/mm3 Final     Hemoglobin   Date Value Ref Range Status   04/16/2021 14.1 13.0 - 17.7 g/dL Final     Hematocrit   Date Value Ref Range Status   04/16/2021 41.1 37.5 - 51.0 % Final     Platelets   Date Value Ref Range Status   04/16/2021 106 (L) 140 - 450 10*3/mm3 Final       CBC:    WBC   Date Value Ref Range Status   04/16/2021 5.71 3.40 - 10.80 10*3/mm3 Final   11/18/2020 6.55 3.40 - 10.80 10*3/mm3 Final     RBC   Date Value Ref Range Status   04/16/2021 4.64 4.14 - 5.80 10*6/mm3 Final   11/18/2020 5.15 4.14 - 5.80 10*6/mm3 Final     Hemoglobin   Date Value Ref Range Status   04/16/2021 14.1 13.0 - 17.7 g/dL Final     Hematocrit   Date Value Ref Range Status   04/16/2021 41.1 37.5 - 51.0 % Final     MCV   Date Value Ref Range Status   04/16/2021 88.6 79.0 - 97.0 fL Final     MCH   Date Value Ref Range Status    04/16/2021 30.4 26.6 - 33.0 pg Final     MCHC   Date Value Ref Range Status   04/16/2021 34.3 31.5 - 35.7 g/dL Final     RDW   Date Value Ref Range Status   04/16/2021 13.5 12.3 - 15.4 % Final     RDW-SD   Date Value Ref Range Status   04/16/2021 43.4 37.0 - 54.0 fl Final     MPV   Date Value Ref Range Status   04/16/2021 10.4 6.0 - 12.0 fL Final     Platelets   Date Value Ref Range Status   04/16/2021 106 (L) 140 - 450 10*3/mm3 Final     Neutrophil %   Date Value Ref Range Status   04/16/2021 49.3 42.7 - 76.0 % Final     Lymphocyte %   Date Value Ref Range Status   04/16/2021 32.6 19.6 - 45.3 % Final     Monocyte %   Date Value Ref Range Status   04/16/2021 13.0 (H) 5.0 - 12.0 % Final     Eosinophil %   Date Value Ref Range Status   04/16/2021 4.0 0.3 - 6.2 % Final     Basophil %   Date Value Ref Range Status   04/16/2021 0.9 0.0 - 1.5 % Final     Immature Grans %   Date Value Ref Range Status   04/16/2021 0.2 0.0 - 0.5 % Final     Neutrophils, Absolute   Date Value Ref Range Status   04/16/2021 2.82 1.70 - 7.00 10*3/mm3 Final     Lymphocytes, Absolute   Date Value Ref Range Status   04/16/2021 1.86 0.70 - 3.10 10*3/mm3 Final     Monocytes, Absolute   Date Value Ref Range Status   04/16/2021 0.74 0.10 - 0.90 10*3/mm3 Final     Eosinophils, Absolute   Date Value Ref Range Status   04/16/2021 0.23 0.00 - 0.40 10*3/mm3 Final     Basophils, Absolute   Date Value Ref Range Status   04/16/2021 0.05 0.00 - 0.20 10*3/mm3 Final     Immature Grans, Absolute   Date Value Ref Range Status   04/16/2021 0.01 0.00 - 0.05 10*3/mm3 Final     nRBC   Date Value Ref Range Status   04/16/2021 0.0 0.0 - 0.2 /100 WBC Final        CMP:    Glucose   Date Value Ref Range Status   03/23/2021 83 65 - 99 mg/dL Final     BUN   Date Value Ref Range Status   03/23/2021 22 8 - 23 mg/dL Final     Creatinine   Date Value Ref Range Status   03/23/2021 1.15 0.76 - 1.27 mg/dL Final     Sodium   Date Value Ref Range Status   03/23/2021 138 136 - 145  mmol/L Final     Potassium   Date Value Ref Range Status   03/23/2021 3.7 3.5 - 5.2 mmol/L Final     Chloride   Date Value Ref Range Status   03/23/2021 109 (H) 98 - 107 mmol/L Final     CO2   Date Value Ref Range Status   03/23/2021 22.2 22.0 - 29.0 mmol/L Final     Calcium   Date Value Ref Range Status   03/23/2021 8.6 8.6 - 10.5 mg/dL Final     Total Protein   Date Value Ref Range Status   03/23/2021 5.5 (L) 6.0 - 8.5 g/dL Final     Albumin   Date Value Ref Range Status   03/23/2021 3.3 2.9 - 4.4 g/dL Final   03/23/2021 3.10 (L) 3.50 - 5.20 g/dL Final     ALT (SGPT)   Date Value Ref Range Status   03/23/2021 17 1 - 41 U/L Final     AST (SGOT)   Date Value Ref Range Status   03/23/2021 17 1 - 40 U/L Final     Alkaline Phosphatase   Date Value Ref Range Status   03/23/2021 81 39 - 117 U/L Final     Total Bilirubin   Date Value Ref Range Status   03/23/2021 0.6 0.0 - 1.2 mg/dL Final     eGFR  Am   Date Value Ref Range Status   11/18/2020 69 >60 mL/min/1.73 Final     Globulin   Date Value Ref Range Status   03/23/2021 2.4 gm/dL Final     A/G Ratio   Date Value Ref Range Status   03/23/2021 1.3 g/dL Final     BUN/Creatinine Ratio   Date Value Ref Range Status   03/23/2021 19.1 7.0 - 25.0 Final     Anion Gap   Date Value Ref Range Status   03/23/2021 6.8 5.0 - 15.0 mmol/L Final           Recent imaging:    CT Abdomen Pelvis With Contrast    Result Date: 3/22/2021  Narrative: CT ABDOMEN PELVIS W CONTRAST-  HISTORY:  Abdominal pain for 2 weeks, mostly upper.  TECHNIQUE:  CT of the abdomen and pelvis was performed following the administration of intravenous contrast. Radiation dose reduction techniques were utilized, including automated exposure control and exposure modulation based on body size.  COMPARISON:  Gallbladder ultrasound 14 2015. CT abdomen with contrast 03/06/2009.  FINDINGS: Heart size is normal. There is no pericardial effusion. There is mild bibasilar atelectasis and/or scarring. Pleural spaces are  clear. The liver is normal in size. There is a heterogeneous appearance of the hepatic parenchyma with a focal area of ill-defined area of hypoattenuation to the right of the falciform ligament. The gallbladder is present. The pancreas, spleen, and adrenal glands are within normal limits. There is a 1.5 cm fluid density left renal lesion, which likely represents a cyst. A hypodense focus in the superior pole of the right kidney is too small to accurately characterize. There are renal sinus cysts on the left. There is no hydronephrosis. No pathologically enlarged abdominal or pelvic lymph nodes are identified. There is mild mesenteric edema and small volume free fluid. There is extensive calcific aortoiliac atherosclerosis. There is complete occlusion of the main portal vein through the portal confluence. There is occlusion of portal vein branches bilaterally. There is complete occlusion of the majority of the splenic vein. There is extension of thrombus into the superior mesenteric vein and multiple distal branches.  There is a tiny hiatal hernia. There is no bowel obstruction. The appendix is present and normal in size. There is scattered colonic diverticula without CT evidence of acute diverticulitis. There are no dilated bowel loops. There are no focally thickened hyperenhancing segments of small bowel or colon. There is fecalization of small bowel loops, which can reflect slow transit. There is no definite evidence of pneumatosis. The bladder is mildly distended and within normal limits. The prostate is present. There is multilevel degenerative disc disease. There is diffuse osseous demineralization.      Impression:  1.  Extensive thrombus involving the majority of the portal venous system, as detailed above. No dilated or focally thickened hyperenhancing loops of bowel and colon are identified. There is some fecalization of small bowel loops, which can reflect slow transit, with no definite evidence of  pneumatosis. 2.  Patchy attenuation of the liver is likely perfusional. There may be component of geographic steatosis. Additionally, given the extent of portal vein thrombus, developing hepatic infarction cannot be excluded. Attention on follow-up imaging is recommended.  Discussed with Dr. Muniz at 1:28 PM.  This report was finalized on 3/22/2021 1:32 PM by Dr. Judith Engel M.D.      XR Chest 1 View    Result Date: 3/22/2021  Narrative: XR CHEST 1 VW-  HISTORY: Male who is 75 years-old,  abdominal pain  TECHNIQUE: Frontal view of the chest  COMPARISON: None available  FINDINGS: The heart size is borderline. Aorta is tortuous. Pulmonary vasculature is unremarkable. No focal pulmonary consolidation, pleural effusion, or pneumothorax. No acute osseous process.      Impression: No focal pulmonary consolidation. Borderline heart size. Tortuous aorta. Follow-up as clinically indicated.  This report was finalized on 3/22/2021 12:44 PM by Dr. Bk Cortes M.D.         Assessment/Plan     Diagnoses and all orders for this visit:    1. Hypercoagulable state (CMS/HCC) (Primary)  -     CBC & Differential; Future  -     Comprehensive Metabolic Panel; Future  -     Lupus Anticoagulant Reflex; Future  -     Anticardiolipin Antibody, IgG / M, Qn; Future  -     Beta-2 Glycoprotein Antibodies; Future  -     CT Abdomen Pelvis With Contrast; Future  -     Immature Platelet Fraction    2. Portal vein thrombosis  -     CBC & Differential; Future  -     Comprehensive Metabolic Panel; Future  -     Lupus Anticoagulant Reflex; Future  -     Anticardiolipin Antibody, IgG / M, Qn; Future  -     Beta-2 Glycoprotein Antibodies; Future  -     CT Abdomen Pelvis With Contrast; Future  -     Immature Platelet Fraction    3. Thrombocytopenia (CMS/HCC)     IPF REQUESTED TODAY    In summary this patient diagnosed close to a month ago with pylephlebitis and he was placed on heparin and subsequently Eliquis and discharged home. Since  discharge his abdominal pain has almost substantially improved to the point of resolution and he has no abdominal distention. Very occasional nausea. No jaundice. Has normal bowel activity and normal urination.     At this time he has not had any clinical bleeding.     I reviewed with him and his wife present in the room the fact that thrombophilia labs documented that the patient is heterozygous for factor V Leiden mutation and he has positivity for anti-glycoprotein antibodies. The rest of the thrombophilia workup stated above is negative.     It is peculiar to see patients at this time in life with factor V Leiden mutation through their whole life to present with an episode of thrombophilic process in this location. I wonder if the anti-glycoprotein antibody was a cofactor to trigger this phenomenon. In any event, I think we know what is the factor that provided him with the clot and I do believe this patient needs to do the following.     1. I advised him to remain on anticoagulation for the rest of his life. This episode was moderate in intensity and if anticoagulant is stopped I feel afraid in the future another episode can trigger tremendous difficulties in his gastrointestinal tract and very significant morbidity. He is aware that taking anticoagulants will require careful activity to minimize trauma and possible external or internal bleeding.     Given the factor V Leiden mutation heterozygosity, the patient’s mother is 95 years old, alive and she is in an assisted living facility. I do not know if she needs to be tested. Maybe could be useful to find out from what side of the family the genetic alteration comes from. My tendency to believe is that it comes from the side of his father. I do not have any way to confirm this; it is just with the age of the mother with never thrombophilia leads me to believe that maybe she is negative. In any event, I asked him to spread the word among his family members,  older generation and younger generation, about these issues and I asked them to be tested for factor V Leiden mutation.     I do believe that the patient needs to be retested for anti-glycoprotein antibodies in 6 weeks added on with a CBC and a CMP. I am going to go ahead and schedule a follow-up CT scan of the abdomen to review the status of the thrombosis that probably by now has recanalized and has found bypass issues or channels to further immobilize blood back into the portal system.     Today the patient has mild thrombocytopenia. This could go along with anti-glycoprotein antibodies and this will require to be monitored. Also this could be associated with congestive splenomegaly that could be triggered by the clot in the portal vein. The CT scan will tell us the story. In any event, the patient was advised about all these issues along with his wife. We went ahead and scheduled his CT scan and laboratory testing in 6 weeks and to come back and see me in 7 weeks.

## 2021-04-19 ENCOUNTER — TELEPHONE (OUTPATIENT)
Dept: ONCOLOGY | Facility: CLINIC | Age: 76
End: 2021-04-19

## 2021-04-19 NOTE — TELEPHONE ENCOUNTER
Spoke with patient this am to let him know that his creatinine and bun were elevated and to increase water intake. Patient v/u. No questions.

## 2021-06-04 ENCOUNTER — LAB (OUTPATIENT)
Dept: LAB | Facility: HOSPITAL | Age: 76
End: 2021-06-04

## 2021-06-04 ENCOUNTER — HOSPITAL ENCOUNTER (OUTPATIENT)
Dept: PET IMAGING | Facility: HOSPITAL | Age: 76
Discharge: HOME | End: 2021-06-04
Admitting: INTERNAL MEDICINE

## 2021-06-04 DIAGNOSIS — I81 PORTAL VEIN THROMBOSIS: ICD-10-CM

## 2021-06-04 DIAGNOSIS — D68.59 HYPERCOAGULABLE STATE (HCC): ICD-10-CM

## 2021-06-04 DIAGNOSIS — N40.0 PROSTATE ENLARGEMENT: ICD-10-CM

## 2021-06-04 DIAGNOSIS — E78.2 MIXED HYPERLIPIDEMIA: Primary | ICD-10-CM

## 2021-06-04 DIAGNOSIS — I10 ESSENTIAL HYPERTENSION: ICD-10-CM

## 2021-06-04 LAB
ALBUMIN SERPL-MCNC: 3.9 G/DL (ref 3.5–5.2)
ALBUMIN/GLOB SERPL: 1.6 G/DL (ref 1.1–2.4)
ALP SERPL-CCNC: 77 U/L (ref 38–116)
ALT SERPL W P-5'-P-CCNC: 16 U/L (ref 0–41)
ANION GAP SERPL CALCULATED.3IONS-SCNC: 8.6 MMOL/L (ref 5–15)
AST SERPL-CCNC: 17 U/L (ref 0–40)
BASOPHILS # BLD AUTO: 0.04 10*3/MM3 (ref 0–0.2)
BASOPHILS NFR BLD AUTO: 0.8 % (ref 0–1.5)
BILIRUB SERPL-MCNC: 0.6 MG/DL (ref 0.2–1.2)
BUN SERPL-MCNC: 20 MG/DL (ref 6–20)
BUN/CREAT SERPL: 15.7 (ref 7.3–30)
CALCIUM SPEC-SCNC: 9.8 MG/DL (ref 8.5–10.2)
CHLORIDE SERPL-SCNC: 106 MMOL/L (ref 98–107)
CO2 SERPL-SCNC: 23.4 MMOL/L (ref 22–29)
CREAT BLDA-MCNC: 1.3 MG/DL (ref 0.6–1.3)
CREAT SERPL-MCNC: 1.27 MG/DL (ref 0.7–1.3)
DEPRECATED RDW RBC AUTO: 45.8 FL (ref 37–54)
EOSINOPHIL # BLD AUTO: 0.23 10*3/MM3 (ref 0–0.4)
EOSINOPHIL NFR BLD AUTO: 4.5 % (ref 0.3–6.2)
ERYTHROCYTE [DISTWIDTH] IN BLOOD BY AUTOMATED COUNT: 14 % (ref 12.3–15.4)
GFR SERPL CREATININE-BSD FRML MDRD: 56 ML/MIN/1.73
GLOBULIN UR ELPH-MCNC: 2.4 GM/DL (ref 1.8–3.5)
GLUCOSE SERPL-MCNC: 111 MG/DL (ref 74–124)
HCT VFR BLD AUTO: 44.6 % (ref 37.5–51)
HGB BLD-MCNC: 15.1 G/DL (ref 13–17.7)
IMM GRANULOCYTES # BLD AUTO: 0.02 10*3/MM3 (ref 0–0.05)
IMM GRANULOCYTES NFR BLD AUTO: 0.4 % (ref 0–0.5)
LYMPHOCYTES # BLD AUTO: 1.19 10*3/MM3 (ref 0.7–3.1)
LYMPHOCYTES NFR BLD AUTO: 23.4 % (ref 19.6–45.3)
MCH RBC QN AUTO: 30.4 PG (ref 26.6–33)
MCHC RBC AUTO-ENTMCNC: 33.9 G/DL (ref 31.5–35.7)
MCV RBC AUTO: 89.9 FL (ref 79–97)
MONOCYTES # BLD AUTO: 0.56 10*3/MM3 (ref 0.1–0.9)
MONOCYTES NFR BLD AUTO: 11 % (ref 5–12)
NEUTROPHILS NFR BLD AUTO: 3.04 10*3/MM3 (ref 1.7–7)
NEUTROPHILS NFR BLD AUTO: 59.9 % (ref 42.7–76)
NRBC BLD AUTO-RTO: 0 /100 WBC (ref 0–0.2)
PLATELET # BLD AUTO: 114 10*3/MM3 (ref 140–450)
PMV BLD AUTO: 10.9 FL (ref 6–12)
POTASSIUM SERPL-SCNC: 4 MMOL/L (ref 3.5–4.7)
PROT SERPL-MCNC: 6.3 G/DL (ref 6.3–8)
RBC # BLD AUTO: 4.96 10*6/MM3 (ref 4.14–5.8)
SODIUM SERPL-SCNC: 138 MMOL/L (ref 134–145)
WBC # BLD AUTO: 5.08 10*3/MM3 (ref 3.4–10.8)

## 2021-06-04 PROCEDURE — 25010000002 IOPAMIDOL 61 % SOLUTION: Performed by: INTERNAL MEDICINE

## 2021-06-04 PROCEDURE — 85025 COMPLETE CBC W/AUTO DIFF WBC: CPT

## 2021-06-04 PROCEDURE — 82565 ASSAY OF CREATININE: CPT

## 2021-06-04 PROCEDURE — 36415 COLL VENOUS BLD VENIPUNCTURE: CPT

## 2021-06-04 PROCEDURE — 80053 COMPREHEN METABOLIC PANEL: CPT

## 2021-06-04 PROCEDURE — 74177 CT ABD & PELVIS W/CONTRAST: CPT

## 2021-06-04 RX ORDER — PRAVASTATIN SODIUM 80 MG/1
80 TABLET ORAL NIGHTLY
Qty: 90 TABLET | Refills: 1 | Status: SHIPPED | OUTPATIENT
Start: 2021-06-04 | End: 2021-11-18

## 2021-06-04 RX ORDER — METOPROLOL SUCCINATE 50 MG/1
50 TABLET, EXTENDED RELEASE ORAL DAILY
Qty: 90 TABLET | Refills: 1 | Status: SHIPPED | OUTPATIENT
Start: 2021-06-04 | End: 2021-11-18

## 2021-06-04 RX ORDER — TAMSULOSIN HYDROCHLORIDE 0.4 MG/1
1 CAPSULE ORAL DAILY
Qty: 90 CAPSULE | Refills: 1 | Status: SHIPPED | OUTPATIENT
Start: 2021-06-04 | End: 2021-11-18

## 2021-06-04 RX ADMIN — IOPAMIDOL 85 ML: 612 INJECTION, SOLUTION INTRAVENOUS at 09:10

## 2021-06-05 LAB
CARDIOLIPIN IGG SER IA-ACNC: <9 GPL U/ML (ref 0–14)
CARDIOLIPIN IGM SER IA-ACNC: 15 MPL U/ML (ref 0–12)

## 2021-06-06 LAB
B2 GLYCOPROT1 IGA SER-ACNC: <9 GPI IGA UNITS (ref 0–25)
B2 GLYCOPROT1 IGG SER-ACNC: <9 GPI IGG UNITS (ref 0–20)
B2 GLYCOPROT1 IGM SER-ACNC: 45 GPI IGM UNITS (ref 0–32)

## 2021-06-07 ENCOUNTER — APPOINTMENT (OUTPATIENT)
Dept: LAB | Facility: HOSPITAL | Age: 76
End: 2021-06-07

## 2021-06-07 ENCOUNTER — OFFICE VISIT (OUTPATIENT)
Dept: ONCOLOGY | Facility: CLINIC | Age: 76
End: 2021-06-07

## 2021-06-07 VITALS
HEART RATE: 60 BPM | HEIGHT: 71 IN | SYSTOLIC BLOOD PRESSURE: 127 MMHG | OXYGEN SATURATION: 96 % | TEMPERATURE: 97.7 F | BODY MASS INDEX: 28.85 KG/M2 | RESPIRATION RATE: 17 BRPM | DIASTOLIC BLOOD PRESSURE: 66 MMHG | WEIGHT: 206.1 LBS

## 2021-06-07 DIAGNOSIS — D69.6 THROMBOCYTOPENIA (HCC): ICD-10-CM

## 2021-06-07 DIAGNOSIS — D68.59 HYPERCOAGULABLE STATE (HCC): ICD-10-CM

## 2021-06-07 DIAGNOSIS — I81 PORTAL VEIN THROMBOSIS: Primary | ICD-10-CM

## 2021-06-07 LAB
APTT HEX PL PPP: 13 SEC (ref 0–11)
DRVVT SCREEN TO CONFIRM RATIO: 2.2 RATIO (ref 0.8–1.2)
LA 2 SCREEN W REFLEX-IMP: ABNORMAL
MIXING APTT: 59.5 SEC (ref 0–48.9)
MIXING DRVVT: 64 SEC (ref 0–40.4)
SCREEN APTT: 64.9 SEC (ref 0–51.9)
SCREEN DRVVT: 121.4 SEC (ref 0–47)

## 2021-06-07 PROCEDURE — 99214 OFFICE O/P EST MOD 30 MIN: CPT | Performed by: INTERNAL MEDICINE

## 2021-06-07 NOTE — PROGRESS NOTES
Subjective         DURING THE VISIT WITH THE PATIENT TODAY , PATIENT HAD FACE MASK, MY MEDICAL ASSISTANT AND I  HAD PROPPER PROTECTIVE EQUIPMENT, AND I DID HAND HYGIENE WITH SOAP AND WATER BEFORE AND AFTER THE VISIT.     REASONS FOR FOLLOWUP: pylephlebitis, FACTOR V LEIDEN MUTATION, ANTIGLYCOPROTEIN ANTIBODY POSITIVE     HISTORY OF PRESENT ILLNESS:    This patient is here today stating that he has been feeling terrific from the point of view of his pylephlebitis with no abdominal pain, distention, jaundice. He remains on anticoagulant Eliquis twice a day with no clinical bleeding. In the interim he has had a new CT scan to evaluate the status of the portal vein and laboratory testing in order to test the antiglycoprotein antibody. He has not had any issues in regard to his legs, no rashes in the skin, no joint pain, no clinical bleeding, no cardiovascular or respiratory symptomatology. He feels extremely well. He recognizes that he is not eating right with a lot of junk food and he needs to straighten up that process. Otherwise no other new problems.           Past Medical History:   Diagnosis Date   • Abdominal pain    • Arteriosclerotic cardiovascular disease    • Diverticulosis    • Dysphagia    • GERD (gastroesophageal reflux disease)    • H/O Interstitial cystitis    • Hyperlipidemia    • Hypertension    • Internal hemorrhoids    • Prostate enlargement    • Renal cyst, right    • Umbilical hernia         Past Surgical History:   Procedure Laterality Date   • ACHILLES TENDON REPAIR      ruptured tendon   • COLONOSCOPY N/A 2012    Dr Alvarado/[facility]   • SHOULDER SURGERY     • TONSILLECTOMY          Current Outpatient Medications on File Prior to Visit   Medication Sig Dispense Refill   • amLODIPine-benazepril (LOTREL 5-20) 5-20 MG per capsule TAKE 1 CAPSULE DAILY (Patient taking differently: Take 1 capsule by mouth Daily.) 90 capsule 3   • apixaban (ELIQUIS) 5 MG tablet tablet Take 1 tablet by mouth Every 12  (Twelve) Hours. 60 tablet 2   • Apixaban Starter Pack tablet therapy pack Take two 5 mg tablets by mouth every 12 hours for 7 days. Followed by one 5 mg tablet every 12 hours. 74 tablet 0   • aspirin 81 MG tablet Take 81 mg by mouth daily.     • Coenzyme Q10 150 MG capsule Take 1 tablet by mouth Every Night.     • Melatonin 3 MG capsule Take 1 capsule by mouth Every Night.     • metoprolol succinate XL (TOPROL-XL) 50 MG 24 hr tablet Take 1 tablet by mouth Daily. 90 tablet 1   • pravastatin (PRAVACHOL) 80 MG tablet Take 1 tablet by mouth Every Night. 90 tablet 1   • tamsulosin (FLOMAX) 0.4 MG capsule 24 hr capsule Take 1 capsule by mouth Daily. 90 capsule 1     No current facility-administered medications on file prior to visit.        ALLERGIES:    Allergies   Allergen Reactions   • Sulfamethoxazole-Trimethoprim Itching     Abdominal pain as well         Social History     Socioeconomic History   • Marital status:      Spouse name: Amarilys   • Number of children: Not on file   • Years of education: Not on file   • Highest education level: Not on file   Tobacco Use   • Smoking status: Former Smoker   • Smokeless tobacco: Never Used   Substance and Sexual Activity   • Alcohol use: No   • Drug use: No   • Sexual activity: Defer        Family History   Problem Relation Age of Onset   • Heart attack Father    • Heart disease Father    • Hypertension Mother         HEMATOLOGY HISTORY:  75-year-old white male who has had abdominal pain for almost 1 month in the right upper quadrant and has been progressively worse and becoming almost epigastric, especially in the morning with an intensity of 7 out of 10. He has occasional nausea but no vomiting. He has sensation of distended abdomen. He has not had excessive burping or belching and neither flatulence. He has been having normal bowel activity with no passage of blood in the stool. No diarrhea. He has not developed any jaundice. Urination has remained normal. He has  had minimal swelling in his lower extremities and he states his feet remain cold all the time. Given the persistency of his symptoms and given the fact that he was seen at an urgent care center at [facility] and advised to be seen by Gastroenterology at [place] and given an appointment for 6 weeks, he decided to come to the emergency room. At the time of the emergency room visit, a CT scan of the abdomen was done that documented extensive thrombosis of the portal vein. The extension of the thrombosis was into the splenic vein into the superior mesenteric vein. The patient was placed immediately on heparin. Since then the symptoms have improved some. He has no nausea or vomiting this morning. His abdomen still remains kind of distended but he has had good bowel activity this morning with no passage of blood in the stool. He has not had any fever, chills or sweats. He denies any cough, sputum production or shortness of breath. He has no bone pain. No joint pain. No rash in the skin. No alopecia. No sores in his mouth and no lesions in the skin otherwise like livedo reticularis. The patient denies any neurological symptoms. He denies any changes in performance status. His weight has remained stable. He is very much indispensable at home. His wife has multiple comorbidities and he is the only one that is able to handle her.    THROMBOPHILIA  CLINICAL AND LABORATORY REPORT:  DATE: 4/ /16 /21     DIAGNOSIS: PYLEPHLEBITIS 3/2021      KEY: P EQUAL TO POSITIVE  , N  EQUAL TO NEGATIVE.    PREPARED BY SENTHIL KELLEY MD STAYS IN THE CHART PERMANENTLY      CLINICAL FACTORS:     OBESITY: ___Y___     DIABETES: __N____.    IMMOBILITY: __N____.    SMOKING HISTORY : ____N________ PACK A DAY/ YEARS ________    LONG TRIPS BY CAR OR AIRPLANE: ___N_____    RECENT SURGERY: ___N_______   LOCATION: ________    TRAUMA: ____N____    MEDICATIONS:   BIRTH CONTROL MEDICATIONS ____N____ ANDROGENS _N______ ESTROGENS __N______ HEPARIN  N________ OTHERS ____N___    PREGNANCY:   ____N____.    FAMILY HISTORY OF BLOOD CLOTHS:    POSITIVE:   ________ NEGATIVE __N_____    HEMATOLOGIC DISORDER:  POLYCYTHEMIA VERA ___N____ THROMBOCYTOSIS _N_____    DIC:___N_____  TTP ___N_______    COLLAGEN VASCULAR DISEASE: __N___   DIAGNOSIS: ________________________.    VASCULITIS: TYPE AND LOCATION ____N___________ BIOPSY PROVEN:____________    CANCER DIAGNOSIS: ___N____    TYPE ________    CANCER SCREENING:  BREAST _______ COLON ___Y_____ LUNG ________ GENITOURINARY ___Y______   CT SCANS _____Y______ NORMAL ______ ABNORMAL ________    INDWELLING VASCULAR DEVICE:  ____N_____. LOCATION _________    INFLAMMATORY BOWEL DISEASE: ULCERATIVE COLITIS ______N____   CHRON'S DISEASE ___________    RECENT COVID 19 INFECTION ______N_________.          LABORATORY:    FACTOR V LEIDEN MUTATION: POSITIVE   _Y____ NEGATIVE _____ HETEROZYGOUS __Y______ HOMOZYGOUS ________    PROTHROMBIN GENE MUTATION:  POSITIVE _____ NEGATIVE _N____    ANTITHROMBIN III : NORMAL ___N__   ABNORMAL ______ QUANTIFICATION: _______    PROTEIN S ANTIGEN _____N_____ PROTEIN S FUNCTIONAL ___N_____ PROTEIN C _N________     LUPUS ANTICOAGULANT: POSITIVE _____  NEGATIVE _N____. REPEATED  6 WEEKS LATER ____YES POSITIVE__    ANTICARDIOLIPIN ANTIBODY PROFILE : POSITIVE ______  NEGATIVE _N_____  IGG _____ IGM _____ REPEATED 6 WEEKS LATER _________    ANTI GLYCOPROTEIN ANTIBODY:  POSITIVE __Y___  NEGATIVE  _______   IGG _____ IGM _Y/19____ REPEATED 12 WEEKS LATER __Y IT REMAINS POSITIVE______    ANTIPHOSPATIDIL SERINE ANTIBODY:  POSITIVE _____ NEGATIVE _______    SERUM PROTEIN ELECTROPHORESIS AND IMMUNOFIXATION: NO MONOCLONAL PROTEIN __N______ POSITIVE MONOCLONAL PROTEIN ______ TYPE _________    C REACTIVE PROTEIN HIGH SENSIBILITY: NORMAL __Y_____ ABNORMAL :QUANTIFICATION ________    SEDIMENTATION RATE: _____ MM/H.    FIBRINOGEN LEVEL: _________ NORMAL _______  ABNORMAL _Y/465_________.    LIPID PROFILE:    CHOLESTEROL  "HIGH ______Y____  TRYGLYCERIDES HIGH  __Y______    HEPARIN ASSOCIATED ANTIPLATELET ANTIBODY: _NA________        Objective     Vitals:    06/07/21 1326   BP: 127/66   Pulse: 60   Resp: 17   Temp: 97.7 °F (36.5 °C)   TempSrc: Temporal   SpO2: 96%   Weight: 93.5 kg (206 lb 1.6 oz)   Height: 180.3 cm (70.98\")   PainSc: 0-No pain     Current Status 6/7/2021   ECOG score 0       Physical Exam             I HAVE PERSONALLY REVIEWED THE HISTORY OF THE PRESENT ILLNESS, PAST MEDICAL HISTORY, FAMILY HISTORY, SOCIAL HISTORY, ALLERGIES, MEDICATIONS STATED ABOVE IN THE OFFICE NOTE FROM TODAY.        GENERAL:  Well-developed, well-nourished  Patient  in no acute distress.   SKIN:  Warm, dry ,NO rashes,NO purpura ,NO petechiae.  HEENT:  Pupils were equal and reactive to light and accomodation, conjunctivae noninjected, no pterygium, normal extraocular movements, normal visual acuity.   NECK:  Supple with good range of motion; no thyromegaly or masses, no JVD or bruits, no cervical adenopathies.No carotid artery pain, no carotid abnormal pulsation , NO arterial dance.  LYMPHATICS:  No cervical, NO supraclavicular, NO axillary,NO epitrochlear , NO inguinal adenopathy.  CARDIAC   normal rate and regular rhythm, without murmur,NO rubs NO S3 NO S4 right or left .  VASCULAR VENOUS: no cyanosis, collateral circulation, varicosities, edema, palpable cords, pain, erythema.  ABDOMEN:  Soft, nontender with no hepatomegaly, no splenomegaly,no masses, no ascites, no collateral circulation,no distention,no [place] sign, no abdominal pain, no inguinal hernias,no umbilical hernia, no abdominal bruits. Normal bowel sounds.  GENITAL: Not  Performed.  EXTREMITIES  AND SPINE:  No clubbing, cyanosis or edema, no deformities or pain .No kyphosis, scoliosis, deformities or pain in spine, ribs or pelvic bone.  NEUROLOGICAL:  Patient was awake, alert, oriented to time, person and place.        RECENT LABS:  Hematology WBC   Date Value Ref Range Status "   06/04/2021 5.08 3.40 - 10.80 10*3/mm3 Final   11/18/2020 6.55 3.40 - 10.80 10*3/mm3 Final     RBC   Date Value Ref Range Status   06/04/2021 4.96 4.14 - 5.80 10*6/mm3 Final   11/18/2020 5.15 4.14 - 5.80 10*6/mm3 Final     Hemoglobin   Date Value Ref Range Status   06/04/2021 15.1 13.0 - 17.7 g/dL Final     Hematocrit   Date Value Ref Range Status   06/04/2021 44.6 37.5 - 51.0 % Final     Platelets   Date Value Ref Range Status   06/04/2021 114 (L) 140 - 450 10*3/mm3 Final       CBC:    WBC   Date Value Ref Range Status   06/04/2021 5.08 3.40 - 10.80 10*3/mm3 Final   11/18/2020 6.55 3.40 - 10.80 10*3/mm3 Final     RBC   Date Value Ref Range Status   06/04/2021 4.96 4.14 - 5.80 10*6/mm3 Final   11/18/2020 5.15 4.14 - 5.80 10*6/mm3 Final     Hemoglobin   Date Value Ref Range Status   06/04/2021 15.1 13.0 - 17.7 g/dL Final     Hematocrit   Date Value Ref Range Status   06/04/2021 44.6 37.5 - 51.0 % Final     MCV   Date Value Ref Range Status   06/04/2021 89.9 79.0 - 97.0 fL Final     MCH   Date Value Ref Range Status   06/04/2021 30.4 26.6 - 33.0 pg Final     MCHC   Date Value Ref Range Status   06/04/2021 33.9 31.5 - 35.7 g/dL Final     RDW   Date Value Ref Range Status   06/04/2021 14.0 12.3 - 15.4 % Final     RDW-SD   Date Value Ref Range Status   06/04/2021 45.8 37.0 - 54.0 fl Final     MPV   Date Value Ref Range Status   06/04/2021 10.9 6.0 - 12.0 fL Final     Platelets   Date Value Ref Range Status   06/04/2021 114 (L) 140 - 450 10*3/mm3 Final     Neutrophil %   Date Value Ref Range Status   06/04/2021 59.9 42.7 - 76.0 % Final     Lymphocyte %   Date Value Ref Range Status   06/04/2021 23.4 19.6 - 45.3 % Final     Monocyte %   Date Value Ref Range Status   06/04/2021 11.0 5.0 - 12.0 % Final     Eosinophil %   Date Value Ref Range Status   06/04/2021 4.5 0.3 - 6.2 % Final     Basophil %   Date Value Ref Range Status   06/04/2021 0.8 0.0 - 1.5 % Final     Immature Grans %   Date Value Ref Range Status   06/04/2021  0.4 0.0 - 0.5 % Final     Neutrophils, Absolute   Date Value Ref Range Status   06/04/2021 3.04 1.70 - 7.00 10*3/mm3 Final     Lymphocytes, Absolute   Date Value Ref Range Status   06/04/2021 1.19 0.70 - 3.10 10*3/mm3 Final     Monocytes, Absolute   Date Value Ref Range Status   06/04/2021 0.56 0.10 - 0.90 10*3/mm3 Final     Eosinophils, Absolute   Date Value Ref Range Status   06/04/2021 0.23 0.00 - 0.40 10*3/mm3 Final     Basophils, Absolute   Date Value Ref Range Status   06/04/2021 0.04 0.00 - 0.20 10*3/mm3 Final     Immature Grans, Absolute   Date Value Ref Range Status   06/04/2021 0.02 0.00 - 0.05 10*3/mm3 Final     nRBC   Date Value Ref Range Status   06/04/2021 0.0 0.0 - 0.2 /100 WBC Final        CMP:    Glucose   Date Value Ref Range Status   06/04/2021 111 74 - 124 mg/dL Final     BUN   Date Value Ref Range Status   06/04/2021 20 6 - 20 mg/dL Final     Creatinine   Date Value Ref Range Status   06/04/2021 1.30 0.60 - 1.30 mg/dL Final     Comment:     Serial Number: [id number]:  [id number]     Sodium   Date Value Ref Range Status   06/04/2021 138 134 - 145 mmol/L Final     Potassium   Date Value Ref Range Status   06/04/2021 4.0 3.5 - 4.7 mmol/L Final     Chloride   Date Value Ref Range Status   06/04/2021 106 98 - 107 mmol/L Final     CO2   Date Value Ref Range Status   06/04/2021 23.4 22.0 - 29.0 mmol/L Final     Calcium   Date Value Ref Range Status   06/04/2021 9.8 8.5 - 10.2 mg/dL Final     Total Protein   Date Value Ref Range Status   06/04/2021 6.3 6.3 - 8.0 g/dL Final     Albumin   Date Value Ref Range Status   06/04/2021 3.90 3.50 - 5.20 g/dL Final     ALT (SGPT)   Date Value Ref Range Status   06/04/2021 16 0 - 41 U/L Final     AST (SGOT)   Date Value Ref Range Status   06/04/2021 17 0 - 40 U/L Final     Alkaline Phosphatase   Date Value Ref Range Status   06/04/2021 77 38 - 116 U/L Final     Total Bilirubin   Date Value Ref Range Status   06/04/2021 0.6 0.2 - 1.2 mg/dL Final     eGFR African Am    Date Value Ref Range Status   11/18/2020 69 >60 mL/min/1.73 Final     Globulin   Date Value Ref Range Status   06/04/2021 2.4 1.8 - 3.5 gm/dL Final     A/G Ratio   Date Value Ref Range Status   06/04/2021 1.6 1.1 - 2.4 g/dL Final     BUN/Creatinine Ratio   Date Value Ref Range Status   06/04/2021 15.7 7.3 - 30.0 Final     Anion Gap   Date Value Ref Range Status   06/04/2021 8.6 5.0 - 15.0 mmol/L Final           Recent imaging:    CT Abdomen Pelvis With Contrast    Result Date: 6/4/2021  Narrative: CT ABDOMEN AND PELVIS WITH IV CONTRAST  HISTORY: Follow-up portal vein thrombus  TECHNIQUE: Radiation dose reduction techniques were utilized, including automated exposure control and exposure modulation based on body size. Axial images were obtained through the abdomen and pelvis after the administration of IV contrast. Coronal and sagittal reformatted images obtained.  COMPARISON: 03/22/2021  FINDINGS:  ABDOMEN: The lung bases are clear. There is a small hiatal hernia. 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 The right and left portal vein branches are both patent. There is some persistent thrombus seen in a posterior right portal vein branch on image 50. Multiple collateral veins are seen in the upper abdomen. The gallbladder and spleen are unremarkable. Bilateral renal cysts. The adrenal glands and pancreas are unremarkable.  PELVIS: The bladder is unremarkable. No free fluid. Colon is unremarkable. The appendix is normal. No acute bony abnormality.      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 The right and left portal vein branches are both patent. There is some persistent thrombus within a posterior right portal vein branch.  Radiation dose reduction techniques were utilized, including automated  exposure control and exposure modulation based on body size.  This report was finalized on 6/4/2021 9:43 AM by Dr. Jono Harrison M.D.       Component      Latest Ref Rng & Units 3/23/2021 6/4/2021   Beta-2 Glyco 1 IgG      0 - 20 GPI IgG units <9 <9   Beta-2 Glyco 1 IgA      0 - 25 GPI IgA units <9 <9   Beta-2 Glyco 1 IgM      0 - 32 GPI IgM units 44 (H) 45 (H)       Assessment/Plan     Diagnoses and all orders for this visit:    1. Portal vein thrombosis (Primary)    2. Thrombocytopenia (CMS/HCC)    3. Hypercoagulable state (CMS/HCC)        In summary this patient diagnosed close to a month ago with pylephlebitis and he was placed on heparin and subsequently Eliquis and discharged home. Since discharge his abdominal pain has almost substantially improved to the point of resolution and he has no abdominal distention. Very occasional nausea. No jaundice. Has normal bowel activity and normal urination.     At this time he has not had any clinical bleeding.     I reviewed with him and his wife present in the room the fact that thrombophilia labs documented that the patient is heterozygous for factor V Leiden mutation and he has positivity for anti-glycoprotein antibodies. The rest of the thrombophilia workup stated above is negative.     It is peculiar to see patients at this time in life with factor V Leiden mutation through their whole life to present with an episode of thrombophilic process in this location. I wonder if the anti-glycoprotein antibody was a cofactor to trigger this phenomenon. In any event, I think we know what is the factor that provided him with the clot and I do believe this patient needs to do the following.     1. I advised him to remain on anticoagulation for the rest of his life. This episode was moderate in intensity and if anticoagulant is stopped I feel afraid in the future another episode can trigger tremendous difficulties in his gastrointestinal tract and very significant morbidity. He  is aware that taking anticoagulants will require careful activity to minimize trauma and possible external or internal bleeding.     Given the factor V Leiden mutation heterozygosity, the patient’s mother is 95 years old, alive and she is in an assisted living facility. I do not know if she needs to be tested. Maybe could be useful to find out from what side of the family the genetic alteration comes from. My tendency to believe is that it comes from the side of his father. I do not have any way to confirm this; it is just with the age of the mother with never thrombophilia leads me to believe that maybe she is negative. In any event, I asked him to spread the word among his family members, older generation and younger generation, about these issues and I asked them to be tested for factor V Leiden mutation.     I do believe that the patient needs to be retested for anti-glycoprotein antibodies in 6 weeks added on with a CBC and a CMP. I am going to go ahead and schedule a follow-up CT scan of the abdomen to review the status of the thrombosis that probably by now has recanalized and has found bypass issues or channels to further immobilize blood back into the portal system.       The patient was further reviewed on 06/07/2021. He has not had any new manifestations of his portal vein thrombosis in fact no symptoms at all. A CT scan that I have personally reviewed has documented cavernous transformation of the portal vein and recanalization as well as multiple collateral circulation formation. That means that the flow and the venous return from the growth into the liver is appropriately directed. The patient has not had any new thrombosis. His antiglycoprotein antibody remains positive 44 in comparison with previous assessment. He has also a lupus anticoagulant positive.     I pointed out to the patient that given the circumstances and his factor V Leiden mutation heterozygosity he is to remain on anticoagulation for  the rest of his life. From that point of view the patient is going to proceed. He know that he needs to protect himself from trauma to minimize bleeding.    His son and his brother are in the process of being tested for factor V Leiden mutation. The patient's mother is 95, she never had blood clots therefore I do not believe that she needs to be tested for anything particular at this point of her life. She is in assisted living facility.    I would like to review the patient back in 6 months with a CBC and antiglycoprotein antibody and lupus anticoagulant at that time. Other than that he will remain on Eliquis.     He is aware of the risk of bleeding.     Otherwise he will be reviewed back by me in 6 months.

## 2021-06-08 ENCOUNTER — APPOINTMENT (OUTPATIENT)
Dept: LAB | Facility: HOSPITAL | Age: 76
End: 2021-06-08

## 2021-09-27 ENCOUNTER — TELEPHONE (OUTPATIENT)
Dept: ONCOLOGY | Facility: CLINIC | Age: 76
End: 2021-09-27

## 2021-09-27 NOTE — TELEPHONE ENCOUNTER
"Pt c/o occasional pain up to 3-4 times a week.  Pt describes pain that aches \"below breast bone\", lingers for about an hour, does not radiate but says it is concentrated in that area and can sometimes feel in back.  Denies that it is muscular or bone pain.  Denies GERD or heart burn related.  Pt does report raspy in bronchial area and coughing that \"comes and goes\".  Pt states sometimes it will burn when he coughs.  Pt reports he only notices it in the evening or when \"its quieted down for the day\". Suggested that pt attempt to use prilosec or pepcid in afternoons, so most effective in the evening to r/o reflux.  Pt states he does not feel it is reflux as he has dealt with it.  Pt reports taking eliquis as prescribed and not missing any doses.  Due to hx of thrombosis of portal vein asked clinical RN to d/w Dr. Magdaleno upon return Tues a.m. Pt wishes to only get Dr. Magdaleno' recommendation and states he has been dealing with it and can hear what he has to say.  Encouraged pt to seek emergency medical attention if conditions worsens and not to wait.  "

## 2021-09-27 NOTE — TELEPHONE ENCOUNTER
PATIENT CALLED BACK AT 3:51 STATING THAT HE HAD NOT HEARD BACK FROM ANYONE SINCE HIS CALL AT 8:05 THIS MORNING & IS STILL EXPERIENCING PAIN AND WOULD LIKE TO SEE ABOUT GETTING IN TO SEE SOMEONE. HAD MENTIONED SOMETHING ABOUT GETTING A TEST DONE?  SPOKE WITH RAJANI WHO REVIEWED NOTES AS WELL & SUGGESTED A W/T TO FURTHER ASSIST.

## 2021-09-27 NOTE — TELEPHONE ENCOUNTER
Caller: DOMINGUEZ    Relationship to patient:     Best call back number: [phone]     Type of visit: FOLLOW UP     Requested date: ASAP, PREFERS ANYTIME AFTER 10AM     Additional notes: HE STATES HE IS HAVING INTERMITTENT  PAIN IN THE MIDDLE OF HIS CHEST AGAIN, THE SAME PAIN THAT HE HAD BEFORE.   HE SAYS THE PAIN IS 5-6 ON A PAIN SCALE.

## 2021-09-28 ENCOUNTER — OFFICE VISIT (OUTPATIENT)
Dept: ONCOLOGY | Facility: CLINIC | Age: 76
End: 2021-09-28

## 2021-09-28 ENCOUNTER — LAB (OUTPATIENT)
Dept: LAB | Facility: HOSPITAL | Age: 76
End: 2021-09-28

## 2021-09-28 ENCOUNTER — TELEPHONE (OUTPATIENT)
Dept: ONCOLOGY | Facility: CLINIC | Age: 76
End: 2021-09-28

## 2021-09-28 VITALS
TEMPERATURE: 97.7 F | DIASTOLIC BLOOD PRESSURE: 73 MMHG | HEART RATE: 50 BPM | OXYGEN SATURATION: 97 % | SYSTOLIC BLOOD PRESSURE: 148 MMHG | WEIGHT: 201.4 LBS | HEIGHT: 71 IN | BODY MASS INDEX: 28.19 KG/M2 | RESPIRATION RATE: 18 BRPM

## 2021-09-28 DIAGNOSIS — R10.13 EPIGASTRIC PAIN: ICD-10-CM

## 2021-09-28 DIAGNOSIS — I81 PORTAL VEIN THROMBOSIS: Primary | ICD-10-CM

## 2021-09-28 DIAGNOSIS — D68.59 HYPERCOAGULABLE STATE (HCC): Primary | ICD-10-CM

## 2021-09-28 LAB
ALBUMIN SERPL-MCNC: 4.1 G/DL (ref 3.5–5.2)
ALBUMIN/GLOB SERPL: 1.6 G/DL (ref 1.1–2.4)
ALP SERPL-CCNC: 87 U/L (ref 38–116)
ALT SERPL W P-5'-P-CCNC: 15 U/L (ref 0–41)
ANION GAP SERPL CALCULATED.3IONS-SCNC: 13.2 MMOL/L (ref 5–15)
AST SERPL-CCNC: 21 U/L (ref 0–40)
BASOPHILS # BLD AUTO: 0.06 10*3/MM3 (ref 0–0.2)
BASOPHILS NFR BLD AUTO: 0.9 % (ref 0–1.5)
BILIRUB SERPL-MCNC: 0.8 MG/DL (ref 0.2–1.2)
BUN SERPL-MCNC: 19 MG/DL (ref 6–20)
BUN/CREAT SERPL: 15 (ref 7.3–30)
CALCIUM SPEC-SCNC: 10.1 MG/DL (ref 8.5–10.2)
CHLORIDE SERPL-SCNC: 106 MMOL/L (ref 98–107)
CO2 SERPL-SCNC: 18.8 MMOL/L (ref 22–29)
CREAT SERPL-MCNC: 1.27 MG/DL (ref 0.7–1.3)
DEPRECATED RDW RBC AUTO: 40.9 FL (ref 37–54)
EOSINOPHIL # BLD AUTO: 0.2 10*3/MM3 (ref 0–0.4)
EOSINOPHIL NFR BLD AUTO: 2.9 % (ref 0.3–6.2)
ERYTHROCYTE [DISTWIDTH] IN BLOOD BY AUTOMATED COUNT: 12.8 % (ref 12.3–15.4)
GFR SERPL CREATININE-BSD FRML MDRD: 55 ML/MIN/1.73
GLOBULIN UR ELPH-MCNC: 2.5 GM/DL (ref 1.8–3.5)
GLUCOSE SERPL-MCNC: 92 MG/DL (ref 74–124)
HCT VFR BLD AUTO: 46.4 % (ref 37.5–51)
HGB BLD-MCNC: 15.9 G/DL (ref 13–17.7)
IMM GRANULOCYTES # BLD AUTO: 0.05 10*3/MM3 (ref 0–0.05)
IMM GRANULOCYTES NFR BLD AUTO: 0.7 % (ref 0–0.5)
LYMPHOCYTES # BLD AUTO: 1.54 10*3/MM3 (ref 0.7–3.1)
LYMPHOCYTES NFR BLD AUTO: 22.1 % (ref 19.6–45.3)
MCH RBC QN AUTO: 30 PG (ref 26.6–33)
MCHC RBC AUTO-ENTMCNC: 34.3 G/DL (ref 31.5–35.7)
MCV RBC AUTO: 87.5 FL (ref 79–97)
MONOCYTES # BLD AUTO: 0.96 10*3/MM3 (ref 0.1–0.9)
MONOCYTES NFR BLD AUTO: 13.8 % (ref 5–12)
NEUTROPHILS NFR BLD AUTO: 4.17 10*3/MM3 (ref 1.7–7)
NEUTROPHILS NFR BLD AUTO: 59.6 % (ref 42.7–76)
NRBC BLD AUTO-RTO: 0 /100 WBC (ref 0–0.2)
PLATELET # BLD AUTO: 117 10*3/MM3 (ref 140–450)
PMV BLD AUTO: 10 FL (ref 6–12)
POTASSIUM SERPL-SCNC: 4.6 MMOL/L (ref 3.5–4.7)
PROT SERPL-MCNC: 6.6 G/DL (ref 6.3–8)
RBC # BLD AUTO: 5.3 10*6/MM3 (ref 4.14–5.8)
SODIUM SERPL-SCNC: 138 MMOL/L (ref 134–145)
WBC # BLD AUTO: 6.98 10*3/MM3 (ref 3.4–10.8)

## 2021-09-28 PROCEDURE — 99214 OFFICE O/P EST MOD 30 MIN: CPT | Performed by: NURSE PRACTITIONER

## 2021-09-28 PROCEDURE — 36415 COLL VENOUS BLD VENIPUNCTURE: CPT

## 2021-09-28 PROCEDURE — 80053 COMPREHEN METABOLIC PANEL: CPT

## 2021-09-28 PROCEDURE — 85025 COMPLETE CBC W/AUTO DIFF WBC: CPT

## 2021-09-28 RX ORDER — OMEPRAZOLE 20 MG/1
20 CAPSULE, DELAYED RELEASE ORAL DAILY
Qty: 30 CAPSULE | Refills: 1 | Status: SHIPPED | OUTPATIENT
Start: 2021-09-28 | End: 2021-11-29

## 2021-09-28 NOTE — PROGRESS NOTES
Subjective         DURING THE VISIT WITH THE PATIENT TODAY , PATIENT HAD FACE MASK, MY MEDICAL ASSISTANT AND I  HAD PROPPER PROTECTIVE EQUIPMENT, AND I DID HAND HYGIENE WITH SOAP AND WATER BEFORE AND AFTER THE VISIT.     REASONS FOR FOLLOWUP: pylephlebitis, FACTOR V LEIDEN MUTATION, ANTIGLYCOPROTEIN ANTIBODY POSITIVE     HISTORY OF PRESENT ILLNESS:    Patient called into the office on 9/27/2021 reporting intermittent abdominal pain that was reminiscent of when he was diagnosed with his portal vein thrombosis.  Patient was encouraged to use Prilosec or Pepcid in the afternoon as he reported a burning cough in the afternoons.  He denies any other reflux type symptoms this states he does have raspy cough that comes and goes.  He reports the pain is just below his breastbone and lingers for about an hour.  Sometimes he can feel this in his back but it does not radiate to any other area.  Denies any missed doses of his Eliquis.  Patient has had no lower extremity edema, increased shortness of breath, or pain with deep breath.          Past Medical History:   Diagnosis Date   • Abdominal pain    • Arteriosclerotic cardiovascular disease    • Diverticulosis    • Dysphagia    • GERD (gastroesophageal reflux disease)    • H/O Interstitial cystitis    • Hyperlipidemia    • Hypertension    • Internal hemorrhoids    • Prostate enlargement    • Renal cyst, right    • Umbilical hernia         Past Surgical History:   Procedure Laterality Date   • ACHILLES TENDON REPAIR      ruptured tendon   • COLONOSCOPY N/A 2012    Dr Alvarado/[facility]   • SHOULDER SURGERY     • TONSILLECTOMY          Current Outpatient Medications on File Prior to Visit   Medication Sig Dispense Refill   • amLODIPine-benazepril (LOTREL 5-20) 5-20 MG per capsule TAKE 1 CAPSULE DAILY (Patient taking differently: Take 1 capsule by mouth Daily.) 90 capsule 3   • apixaban (ELIQUIS) 5 MG tablet tablet Take 1 tablet by mouth Every 12 (Twelve) Hours. 60 tablet 3   •  Coenzyme Q10 150 MG capsule Take 1 tablet by mouth Every Night.     • Melatonin 3 MG capsule Take 1 capsule by mouth Every Night.     • metoprolol succinate XL (TOPROL-XL) 50 MG 24 hr tablet Take 1 tablet by mouth Daily. 90 tablet 1   • pravastatin (PRAVACHOL) 80 MG tablet Take 1 tablet by mouth Every Night. 90 tablet 1   • tamsulosin (FLOMAX) 0.4 MG capsule 24 hr capsule Take 1 capsule by mouth Daily. 90 capsule 1     No current facility-administered medications on file prior to visit.        ALLERGIES:    Allergies   Allergen Reactions   • Sulfamethoxazole-Trimethoprim Itching     Abdominal pain as well         Social History     Socioeconomic History   • Marital status:      Spouse name: Amarilys   • Number of children: Not on file   • Years of education: Not on file   • Highest education level: Not on file   Tobacco Use   • Smoking status: Former Smoker   • Smokeless tobacco: Never Used   Substance and Sexual Activity   • Alcohol use: No   • Drug use: No   • Sexual activity: Defer        Family History   Problem Relation Age of Onset   • Heart attack Father    • Heart disease Father    • Hypertension Mother         HEMATOLOGY HISTORY:  75-year-old white male who has had abdominal pain for almost 1 month in the right upper quadrant and has been progressively worse and becoming almost epigastric, especially in the morning with an intensity of 7 out of 10. He has occasional nausea but no vomiting. He has sensation of distended abdomen. He has not had excessive burping or belching and neither flatulence. He has been having normal bowel activity with no passage of blood in the stool. No diarrhea. He has not developed any jaundice. Urination has remained normal. He has had minimal swelling in his lower extremities and he states his feet remain cold all the time. Given the persistency of his symptoms and given the fact that he was seen at an urgent care center at [facility] and advised to be seen by  Gastroenterology at [place] and given an appointment for 6 weeks, he decided to come to the emergency room. At the time of the emergency room visit, a CT scan of the abdomen was done that documented extensive thrombosis of the portal vein. The extension of the thrombosis was into the splenic vein into the superior mesenteric vein. The patient was placed immediately on heparin. Since then the symptoms have improved some. He has no nausea or vomiting this morning. His abdomen still remains kind of distended but he has had good bowel activity this morning with no passage of blood in the stool. He has not had any fever, chills or sweats. He denies any cough, sputum production or shortness of breath. He has no bone pain. No joint pain. No rash in the skin. No alopecia. No sores in his mouth and no lesions in the skin otherwise like livedo reticularis. The patient denies any neurological symptoms. He denies any changes in performance status. His weight has remained stable. He is very much indispensable at home. His wife has multiple comorbidities and he is the only one that is able to handle her.    THROMBOPHILIA  CLINICAL AND LABORATORY REPORT:  DATE: 4/ /16 /21     DIAGNOSIS: PYLEPHLEBITIS 3/2021      KEY: P EQUAL TO POSITIVE  , N  EQUAL TO NEGATIVE.    PREPARED BY SENTHIL KELLEY MD STAYS IN THE CHART PERMANENTLY      CLINICAL FACTORS:     OBESITY: ___Y___     DIABETES: __N____.    IMMOBILITY: __N____.    SMOKING HISTORY : ____N________ PACK A DAY/ YEARS ________    LONG TRIPS BY CAR OR AIRPLANE: ___N_____    RECENT SURGERY: ___N_______   LOCATION: ________    TRAUMA: ____N____    MEDICATIONS:   BIRTH CONTROL MEDICATIONS ____N____ ANDROGENS _N______ ESTROGENS __N______ HEPARIN N________ OTHERS ____N___    PREGNANCY:   ____N____.    FAMILY HISTORY OF BLOOD CLOTHS:    POSITIVE:   ________ NEGATIVE __N_____    HEMATOLOGIC DISORDER:  POLYCYTHEMIA VERA ___N____ THROMBOCYTOSIS _N_____    DIC:___N_____  TTP  ___N_______    COLLAGEN VASCULAR DISEASE: __N___   DIAGNOSIS: ________________________.    VASCULITIS: TYPE AND LOCATION ____N___________ BIOPSY PROVEN:____________    CANCER DIAGNOSIS: ___N____    TYPE ________    CANCER SCREENING:  BREAST _______ COLON ___Y_____ LUNG ________ GENITOURINARY ___Y______   CT SCANS _____Y______ NORMAL ______ ABNORMAL ________    INDWELLING VASCULAR DEVICE:  ____N_____. LOCATION _________    INFLAMMATORY BOWEL DISEASE: ULCERATIVE COLITIS ______N____   CHRON'S DISEASE ___________    RECENT COVID 19 INFECTION ______N_________.          LABORATORY:    FACTOR V LEIDEN MUTATION: POSITIVE   _Y____ NEGATIVE _____ HETEROZYGOUS __Y______ HOMOZYGOUS ________    PROTHROMBIN GENE MUTATION:  POSITIVE _____ NEGATIVE _N____    ANTITHROMBIN III : NORMAL ___N__   ABNORMAL ______ QUANTIFICATION: _______    PROTEIN S ANTIGEN _____N_____ PROTEIN S FUNCTIONAL ___N_____ PROTEIN C _N________     LUPUS ANTICOAGULANT: POSITIVE _____  NEGATIVE _N____. REPEATED  6 WEEKS LATER ____YES POSITIVE__    ANTICARDIOLIPIN ANTIBODY PROFILE : POSITIVE ______  NEGATIVE _N_____  IGG _____ IGM _____ REPEATED 6 WEEKS LATER _________    ANTI GLYCOPROTEIN ANTIBODY:  POSITIVE __Y___  NEGATIVE  _______   IGG _____ IGM _Y/19____ REPEATED 12 WEEKS LATER __Y IT REMAINS POSITIVE______    ANTIPHOSPATIDIL SERINE ANTIBODY:  POSITIVE _____ NEGATIVE _______    SERUM PROTEIN ELECTROPHORESIS AND IMMUNOFIXATION: NO MONOCLONAL PROTEIN __N______ POSITIVE MONOCLONAL PROTEIN ______ TYPE _________    C REACTIVE PROTEIN HIGH SENSIBILITY: NORMAL __Y_____ ABNORMAL :QUANTIFICATION ________    SEDIMENTATION RATE: _____ MM/H.    FIBRINOGEN LEVEL: _________ NORMAL _______  ABNORMAL _Y/465_________.    LIPID PROFILE:    CHOLESTEROL HIGH ______Y____  TRYGLYCERIDES HIGH  __Y______    HEPARIN ASSOCIATED ANTIPLATELET ANTIBODY: _NA________        Objective     Vitals:    09/28/21 1111   BP: 148/73   Pulse: 50   Resp: 18   Temp: 97.7 °F (36.5 °C)   TempSrc:  "Temporal   SpO2: 97%   Weight: 91.4 kg (201 lb 6.4 oz)   Height: 180.3 cm (70.98\")   PainSc: 0-No pain     Current Status 9/28/2021   ECOG score 0       Physical Exam     I HAVE PERSONALLY REVIEWED THE HISTORY OF THE PRESENT ILLNESS, PAST MEDICAL HISTORY, FAMILY HISTORY, SOCIAL HISTORY, ALLERGIES, MEDICATIONS STATED ABOVE IN THE OFFICE NOTE FROM TODAY.        GENERAL:  Well-developed, well-nourished  Patient  in no acute distress.   SKIN:  Warm, dry ,NO rashes,NO purpura ,NO petechiae.  HEENT:  Pupils were equal and reactive to light and accomodation, conjunctivae noninjected, normal extraocular movements.  NECK:  Supple with good range of motion; no thyromegaly or masses, no JVD or bruits, no cervical adenopathies.No carotid artery pain, no carotid abnormal pulsation , NO arterial dance.  LYMPHATICS:  No cervical, NO supraclavicular adenopathy.  CARDIAC   normal rate and regular rhythm, without murmur  ABDOMEN: Soft, obese, diastases recti noted, no tenderness on palpation.  No fluid wave.    EXTREMITIES  AND SPINE:  No clubbing, cyanosis or edema, no deformities or pain .  NEUROLOGICAL:  Patient was awake, alert, oriented to time, person and place.        RECENT LABS:  Hematology WBC   Date Value Ref Range Status   09/28/2021 6.98 3.40 - 10.80 10*3/mm3 Final   11/18/2020 6.55 3.40 - 10.80 10*3/mm3 Final     RBC   Date Value Ref Range Status   09/28/2021 5.30 4.14 - 5.80 10*6/mm3 Final   11/18/2020 5.15 4.14 - 5.80 10*6/mm3 Final     Hemoglobin   Date Value Ref Range Status   09/28/2021 15.9 13.0 - 17.7 g/dL Final     Hematocrit   Date Value Ref Range Status   09/28/2021 46.4 37.5 - 51.0 % Final     Platelets   Date Value Ref Range Status   09/28/2021 117 (L) 140 - 450 10*3/mm3 Final       CBC:    WBC   Date Value Ref Range Status   09/28/2021 6.98 3.40 - 10.80 10*3/mm3 Final   11/18/2020 6.55 3.40 - 10.80 10*3/mm3 Final     RBC   Date Value Ref Range Status   09/28/2021 5.30 4.14 - 5.80 10*6/mm3 Final   11/18/2020 " 5.15 4.14 - 5.80 10*6/mm3 Final     Hemoglobin   Date Value Ref Range Status   09/28/2021 15.9 13.0 - 17.7 g/dL Final     Hematocrit   Date Value Ref Range Status   09/28/2021 46.4 37.5 - 51.0 % Final     MCV   Date Value Ref Range Status   09/28/2021 87.5 79.0 - 97.0 fL Final     MCH   Date Value Ref Range Status   09/28/2021 30.0 26.6 - 33.0 pg Final     MCHC   Date Value Ref Range Status   09/28/2021 34.3 31.5 - 35.7 g/dL Final     RDW   Date Value Ref Range Status   09/28/2021 12.8 12.3 - 15.4 % Final     RDW-SD   Date Value Ref Range Status   09/28/2021 40.9 37.0 - 54.0 fl Final     MPV   Date Value Ref Range Status   09/28/2021 10.0 6.0 - 12.0 fL Final     Platelets   Date Value Ref Range Status   09/28/2021 117 (L) 140 - 450 10*3/mm3 Final     Neutrophil %   Date Value Ref Range Status   09/28/2021 59.6 42.7 - 76.0 % Final     Lymphocyte %   Date Value Ref Range Status   09/28/2021 22.1 19.6 - 45.3 % Final     Monocyte %   Date Value Ref Range Status   09/28/2021 13.8 (H) 5.0 - 12.0 % Final     Eosinophil %   Date Value Ref Range Status   09/28/2021 2.9 0.3 - 6.2 % Final     Basophil %   Date Value Ref Range Status   09/28/2021 0.9 0.0 - 1.5 % Final     Immature Grans %   Date Value Ref Range Status   09/28/2021 0.7 (H) 0.0 - 0.5 % Final     Neutrophils, Absolute   Date Value Ref Range Status   09/28/2021 4.17 1.70 - 7.00 10*3/mm3 Final     Lymphocytes, Absolute   Date Value Ref Range Status   09/28/2021 1.54 0.70 - 3.10 10*3/mm3 Final     Monocytes, Absolute   Date Value Ref Range Status   09/28/2021 0.96 (H) 0.10 - 0.90 10*3/mm3 Final     Eosinophils, Absolute   Date Value Ref Range Status   09/28/2021 0.20 0.00 - 0.40 10*3/mm3 Final     Basophils, Absolute   Date Value Ref Range Status   09/28/2021 0.06 0.00 - 0.20 10*3/mm3 Final     Immature Grans, Absolute   Date Value Ref Range Status   09/28/2021 0.05 0.00 - 0.05 10*3/mm3 Final     nRBC   Date Value Ref Range Status   09/28/2021 0.0 0.0 - 0.2 /100 WBC  Final        CMP:    Glucose   Date Value Ref Range Status   09/28/2021 92 74 - 124 mg/dL Final     BUN   Date Value Ref Range Status   09/28/2021 19 6 - 20 mg/dL Final     Creatinine   Date Value Ref Range Status   09/28/2021 1.27 0.70 - 1.30 mg/dL Final   06/04/2021 1.30 0.60 - 1.30 mg/dL Final     Comment:     Serial Number: [id number]:  [id number]     Sodium   Date Value Ref Range Status   09/28/2021 138 134 - 145 mmol/L Final     Potassium   Date Value Ref Range Status   09/28/2021 4.6 3.5 - 4.7 mmol/L Final     Chloride   Date Value Ref Range Status   09/28/2021 106 98 - 107 mmol/L Final     CO2   Date Value Ref Range Status   09/28/2021 18.8 (L) 22.0 - 29.0 mmol/L Final     Calcium   Date Value Ref Range Status   09/28/2021 10.1 8.5 - 10.2 mg/dL Final     Total Protein   Date Value Ref Range Status   09/28/2021 6.6 6.3 - 8.0 g/dL Final     Albumin   Date Value Ref Range Status   09/28/2021 4.10 3.50 - 5.20 g/dL Final     ALT (SGPT)   Date Value Ref Range Status   09/28/2021 15 0 - 41 U/L Final     AST (SGOT)   Date Value Ref Range Status   09/28/2021 21 0 - 40 U/L Final     Alkaline Phosphatase   Date Value Ref Range Status   09/28/2021 87 38 - 116 U/L Final     Total Bilirubin   Date Value Ref Range Status   09/28/2021 0.8 0.2 - 1.2 mg/dL Final     eGFR  Am   Date Value Ref Range Status   11/18/2020 69 >60 mL/min/1.73 Final     Globulin   Date Value Ref Range Status   09/28/2021 2.5 1.8 - 3.5 gm/dL Final     A/G Ratio   Date Value Ref Range Status   09/28/2021 1.6 1.1 - 2.4 g/dL Final     BUN/Creatinine Ratio   Date Value Ref Range Status   09/28/2021 15.0 7.3 - 30.0 Final     Anion Gap   Date Value Ref Range Status   09/28/2021 13.2 5.0 - 15.0 mmol/L Final           Recent imaging:    No results found.   Component      Latest Ref Rng & Units 3/23/2021 6/4/2021   Beta-2 Glyco 1 IgG      0 - 20 GPI IgG units <9 <9   Beta-2 Glyco 1 IgA      0 - 25 GPI IgA units <9 <9   Beta-2 Glyco 1 IgM      0 - 32 GPI  IgM units 44 (H) 45 (H)       Assessment/Plan     Diagnoses and all orders for this visit:    1. Portal vein thrombosis (Primary)  -     CT Abdomen Pelvis With Contrast; Future    2. Epigastric pain  -     CT Abdomen Pelvis With Contrast; Future  -     Comprehensive Metabolic Panel; Future    Other orders  -     omeprazole (priLOSEC) 20 MG capsule; Take 1 capsule by mouth Daily.  Dispense: 30 capsule; Refill: 1        In summary this patient diagnosed pylephlebitis and he was placed on heparin and subsequently Eliquis and discharged home. Since discharge his abdominal pain has almost substantially improved to the point of resolution and he has no abdominal distention. Very occasional nausea. No jaundice. Has normal bowel activity and normal urination.     At this time he has not had any clinical bleeding.     The patient is heterozygous for factor V Leiden mutation and he has positivity for anti-glycoprotein antibodies. The rest of the thrombophilia workup stated above is negative.     It is peculiar to see patients at this time in life with factor V Leiden mutation through their whole life to present with an episode of thrombophilic process in this location. I wonder if the anti-glycoprotein antibody was a cofactor to trigger this phenomenon. In any event, I think we know what is the factor that provided him with the clot and I do believe this patient needs to do the following.     The patient was further reviewed on 06/07/2021. He has not had any new manifestations of his portal vein thrombosis in fact no symptoms at all. A CT scan that I have personally reviewed has documented cavernous transformation of the portal vein and recanalization as well as multiple collateral circulation formation. That means that the flow and the venous return from the growth into the liver is appropriately directed. The patient has not had any new thrombosis. His antiglycoprotein antibody remains positive 44 in comparison with  previous assessment. He has also a lupus anticoagulant positive.     I pointed out to the patient that given the circumstances and his factor V Leiden mutation heterozygosity he is to remain on anticoagulation for the rest of his life. From that point of view the patient is going to proceed. He know that he needs to protect himself from trauma to minimize bleeding.    His son and his brother are in the process of being tested for factor V Leiden mutation. The patient's mother is 95, she never had blood clots therefore I do not believe that she needs to be tested for anything particular at this point of her life. She is in assisted living facility.    Patient comes into the office on 9/28/2021 for triage visit due to recent intermittent abdominal pain, epigastric and radiating to the back.  He denies any other associated pain or symptoms.  He does not feel this is reflux he does not have the classic burning up the esophagus.  He however occasionally has a raspy cough.  He has not tried any over-the-counter Prilosec or Pepcid recently.  He states in the past he did have issues with this but not recently.  He denies nausea, edema, shortness of breath, or pain with deep breath.  He states he is not missed any of his Eliquis dosing.  This is reviewed with Dr. Magdaleno today and we will proceed with a CT abdomen pelvis for evaluation due to his portal vein thrombosis.  We will also start the patient on Prilosec 20 mg daily for 1 month to see if this helps his symptoms.  CMP was performed today which was unremarkable.  While the patient follow-up in 2 weeks a video visit to see how his symptoms are and to review his CT scans.    Patient follows up in December with Dr. Magdaleno for CBC and antiglycoprotein antibody and lupus anticoagulant at that time. Other than that he will remain on Eliquis.         SPARKLE Livingston

## 2021-09-28 NOTE — TELEPHONE ENCOUNTER
"Spoke with Carly yesterday afternoon. Patient called in about intermittent chest pain and states this started 3 weeks ago. He states it does not bother him much until laying down at night \"when things get quiet.\" I let him know that I would talk with the Nps/Rasta about the issue. Carly agreed we should talk with Rasta today since patient did not want to go to the ER. I talked with Rasta this morning to see if he wanted stat scans and to see an NP or to see an NP first. Rasta stated he wanted to just have him get In to see an NP first. I called the patient and [ulled up the [facility]. He stated he wanted to come in at the 11am slot for Carly. I let him know he would need to get there at 10:30am for labs prior. This was all sent to scheduling to get set up. Patient v/u.   "

## 2021-10-01 ENCOUNTER — IMMUNIZATION (OUTPATIENT)
Dept: VACCINE CLINIC | Facility: HOSPITAL | Age: 76
End: 2021-10-01

## 2021-10-01 PROCEDURE — 91300 HC SARSCOV02 VAC 30MCG/0.3ML IM: CPT | Performed by: INTERNAL MEDICINE

## 2021-10-01 PROCEDURE — 0003A: CPT | Performed by: INTERNAL MEDICINE

## 2021-10-01 PROCEDURE — 0001A: CPT | Performed by: INTERNAL MEDICINE

## 2021-10-06 ENCOUNTER — HOSPITAL ENCOUNTER (OUTPATIENT)
Dept: CT IMAGING | Facility: HOSPITAL | Age: 76
Discharge: HOME | End: 2021-10-06
Admitting: NURSE PRACTITIONER

## 2021-10-06 DIAGNOSIS — R10.13 EPIGASTRIC PAIN: ICD-10-CM

## 2021-10-06 DIAGNOSIS — I81 PORTAL VEIN THROMBOSIS: ICD-10-CM

## 2021-10-06 LAB — CREAT BLDA-MCNC: 1.3 MG/DL (ref 0.6–1.3)

## 2021-10-06 PROCEDURE — 82565 ASSAY OF CREATININE: CPT

## 2021-10-06 PROCEDURE — 0 DIATRIZOATE MEGLUMINE & SODIUM PER 1 ML: Performed by: NURSE PRACTITIONER

## 2021-10-06 PROCEDURE — 25010000002 IOPAMIDOL 61 % SOLUTION: Performed by: NURSE PRACTITIONER

## 2021-10-06 PROCEDURE — 74177 CT ABD & PELVIS W/CONTRAST: CPT

## 2021-10-06 RX ORDER — APIXABAN 5 MG/1
TABLET, FILM COATED ORAL
Qty: 60 TABLET | Refills: 11 | Status: SHIPPED | OUTPATIENT
Start: 2021-10-06 | End: 2021-12-01 | Stop reason: SDUPTHER

## 2021-10-06 RX ADMIN — IOPAMIDOL 85 ML: 612 INJECTION, SOLUTION INTRAVENOUS at 13:07

## 2021-10-06 RX ADMIN — DIATRIZOATE MEGLUMINE AND DIATRIZOATE SODIUM 30 ML: 660; 100 LIQUID ORAL; RECTAL at 11:45

## 2021-10-06 NOTE — TELEPHONE ENCOUNTER
Eliquis refill request rec electronically from [facility]. Per last office note from Carly BEDOLLA, NP-pt is to continue.    Patient follows up in December with Dr. Magdaleno for CBC and antiglycoprotein antibody and lupus anticoagulant at that time. Other than that he will remain on Eliquis.     I have routed the rx to Dr Magdaleno for signature. Once signed it will be escribed to [facility].

## 2021-10-12 ENCOUNTER — TELEPHONE (OUTPATIENT)
Dept: ONCOLOGY | Facility: CLINIC | Age: 76
End: 2021-10-12

## 2021-10-12 ENCOUNTER — TELEMEDICINE (OUTPATIENT)
Dept: ONCOLOGY | Facility: CLINIC | Age: 76
End: 2021-10-12

## 2021-10-12 DIAGNOSIS — D68.59 HYPERCOAGULABLE STATE (HCC): ICD-10-CM

## 2021-10-12 DIAGNOSIS — I81 PORTAL VEIN THROMBOSIS: Primary | ICD-10-CM

## 2021-10-12 DIAGNOSIS — Z79.01 CHRONIC ANTICOAGULATION: ICD-10-CM

## 2021-10-12 PROCEDURE — 99214 OFFICE O/P EST MOD 30 MIN: CPT | Performed by: NURSE PRACTITIONER

## 2021-10-12 NOTE — TELEPHONE ENCOUNTER
"Called the patient to see if I could assist him with Tristen for his video visit. When he answered he stated he got it figured out he thinks. He explained he was trying to enter the \"waiting room\" too early. I gave him my direct number to call back if he needs assistance. Patient v/u.   "

## 2021-10-12 NOTE — TELEPHONE ENCOUNTER
Caller: DOMINGUEZ FARLEY    Relationship: PATIENT    Best call back number: [phone]    What was the call regarding: PATIENT HAS A [employer] VIDEO VISIT TODAY AT 1:00 AND IS HAVING A HARD TIME SETTING IT UP AND WOULD LIKE A CALL FOR ASSISTANCE.    Do you require a callback: YES

## 2021-10-12 NOTE — TELEPHONE ENCOUNTER
Patient called me to let me know that it would not let him e check in on [facility] for his video visit. I walked him through it and he took me through his process and it was not working. I got his appt switched to a doximitry appt. Patient v/u.

## 2021-10-12 NOTE — PROGRESS NOTES
Subjective   This was an audio and video enabled telemedicine encounter.    REASONS FOR FOLLOWUP: pylephlebitis, FACTOR V LEIDEN MUTATION, ANTIGLYCOPROTEIN ANTIBODY POSITIVE     HISTORY OF PRESENT ILLNESS:    Patient is seen in follow-up today 10/12/2021 for scan review.  He was seen a few weeks ago with complaints of abdominal pain, more of an epigastric/reflux symptoms.  He was told to start taking Prilosec, which he has done.  He states that his symptoms really have not improved or changed much.  He has occasionally taken some Gaviscon or Maalox which do help a little bit.  He states that he primarily notices it in the evenings, but so far types of food, and amounts of food have not had any effect.  He continues on Eliquis 5 mg twice daily and denies any missed doses.  He denies bleeding issues.    Past Medical History:   Diagnosis Date   • Abdominal pain    • Arteriosclerotic cardiovascular disease    • Diverticulosis    • Dysphagia    • GERD (gastroesophageal reflux disease)    • H/O Interstitial cystitis    • Hyperlipidemia    • Hypertension    • Internal hemorrhoids    • Prostate enlargement    • Renal cyst, right    • Umbilical hernia         Past Surgical History:   Procedure Laterality Date   • ACHILLES TENDON REPAIR      ruptured tendon   • COLONOSCOPY N/A 2012    Dr Alvarado/[facility]   • SHOULDER SURGERY     • TONSILLECTOMY          Current Outpatient Medications on File Prior to Visit   Medication Sig Dispense Refill   • amLODIPine-benazepril (LOTREL 5-20) 5-20 MG per capsule TAKE 1 CAPSULE DAILY (Patient taking differently: Take 1 capsule by mouth Daily.) 90 capsule 3   • Coenzyme Q10 150 MG capsule Take 1 tablet by mouth Every Night.     • Eliquis 5 MG tablet tablet TAKE 1 TABLET EVERY 12 HOURS 60 tablet 11   • Melatonin 3 MG capsule Take 1 capsule by mouth Every Night.     • metoprolol succinate XL (TOPROL-XL) 50 MG 24 hr tablet Take 1 tablet by mouth Daily. 90 tablet 1   • omeprazole (priLOSEC) 20 MG  capsule Take 1 capsule by mouth Daily. 30 capsule 1   • pravastatin (PRAVACHOL) 80 MG tablet Take 1 tablet by mouth Every Night. 90 tablet 1   • tamsulosin (FLOMAX) 0.4 MG capsule 24 hr capsule Take 1 capsule by mouth Daily. 90 capsule 1     No current facility-administered medications on file prior to visit.        ALLERGIES:    Allergies   Allergen Reactions   • Sulfamethoxazole-Trimethoprim Itching     Abdominal pain as well         Social History     Socioeconomic History   • Marital status:      Spouse name: Amarilys   Tobacco Use   • Smoking status: Former Smoker   • Smokeless tobacco: Never Used   Substance and Sexual Activity   • Alcohol use: No   • Drug use: No   • Sexual activity: Defer        Family History   Problem Relation Age of Onset   • Heart attack Father    • Heart disease Father    • Hypertension Mother         HEMATOLOGY HISTORY:  75-year-old white male who has had abdominal pain for almost 1 month in the right upper quadrant and has been progressively worse and becoming almost epigastric, especially in the morning with an intensity of 7 out of 10. He has occasional nausea but no vomiting. He has sensation of distended abdomen. He has not had excessive burping or belching and neither flatulence. He has been having normal bowel activity with no passage of blood in the stool. No diarrhea. He has not developed any jaundice. Urination has remained normal. He has had minimal swelling in his lower extremities and he states his feet remain cold all the time. Given the persistency of his symptoms and given the fact that he was seen at an urgent care center at [facility] and advised to be seen by Gastroenterology at [facility] and given an appointment for 6 weeks, he decided to come to the emergency room. At the time of the emergency room visit, a CT scan of the abdomen was done that documented extensive thrombosis of the portal vein. The extension of the thrombosis was into the splenic vein into  the superior mesenteric vein. The patient was placed immediately on heparin. Since then the symptoms have improved some. He has no nausea or vomiting this morning. His abdomen still remains kind of distended but he has had good bowel activity this morning with no passage of blood in the stool. He has not had any fever, chills or sweats. He denies any cough, sputum production or shortness of breath. He has no bone pain. No joint pain. No rash in the skin. No alopecia. No sores in his mouth and no lesions in the skin otherwise like livedo reticularis. The patient denies any neurological symptoms. He denies any changes in performance status. His weight has remained stable. He is very much indispensable at home. His wife has multiple comorbidities and he is the only one that is able to handle her.    THROMBOPHILIA  CLINICAL AND LABORATORY REPORT:  DATE: 4/ /16 /21     DIAGNOSIS: PYLEPHLEBITIS 3/2021      KEY: P EQUAL TO POSITIVE  , N  EQUAL TO NEGATIVE.    PREPARED BY SENTHIL KELLEY MD STAYS IN THE CHART PERMANENTLY      CLINICAL FACTORS:     OBESITY: ___Y___     DIABETES: __N____.    IMMOBILITY: __N____.    SMOKING HISTORY : ____N________ PACK A DAY/ YEARS ________    LONG TRIPS BY CAR OR AIRPLANE: ___N_____    RECENT SURGERY: ___N_______   LOCATION: ________    TRAUMA: ____N____    MEDICATIONS:   BIRTH CONTROL MEDICATIONS ____N____ ANDROGENS _N______ ESTROGENS __N______ HEPARIN N________ OTHERS ____N___    PREGNANCY:   ____N____.    FAMILY HISTORY OF BLOOD CLOTHS:    POSITIVE:   ________ NEGATIVE __N_____    HEMATOLOGIC DISORDER:  POLYCYTHEMIA VERA ___N____ THROMBOCYTOSIS _N_____    DIC:___N_____  TTP ___N_______    COLLAGEN VASCULAR DISEASE: __N___   DIAGNOSIS: ________________________.    VASCULITIS: TYPE AND LOCATION ____N___________ BIOPSY PROVEN:____________    CANCER DIAGNOSIS: ___N____    TYPE ________    CANCER SCREENING:  BREAST _______ COLON ___Y_____ LUNG ________ GENITOURINARY ___Y______   CT SCANS  _____Y______ NORMAL ______ ABNORMAL ________    INDWELLING VASCULAR DEVICE:  ____N_____. LOCATION _________    INFLAMMATORY BOWEL DISEASE: ULCERATIVE COLITIS ______N____   CHRON'S DISEASE ___________    RECENT COVID 19 INFECTION ______N_________.          LABORATORY:    FACTOR V LEIDEN MUTATION: POSITIVE   _Y____ NEGATIVE _____ HETEROZYGOUS __Y______ HOMOZYGOUS ________    PROTHROMBIN GENE MUTATION:  POSITIVE _____ NEGATIVE _N____    ANTITHROMBIN III : NORMAL ___N__   ABNORMAL ______ QUANTIFICATION: _______    PROTEIN S ANTIGEN _____N_____ PROTEIN S FUNCTIONAL ___N_____ PROTEIN C _N________     LUPUS ANTICOAGULANT: POSITIVE _____  NEGATIVE _N____. REPEATED  6 WEEKS LATER ____YES POSITIVE__    ANTICARDIOLIPIN ANTIBODY PROFILE : POSITIVE ______  NEGATIVE _N_____  IGG _____ IGM _____ REPEATED 6 WEEKS LATER _________    ANTI GLYCOPROTEIN ANTIBODY:  POSITIVE __Y___  NEGATIVE  _______   IGG _____ IGM _Y/19____ REPEATED 12 WEEKS LATER __Y IT REMAINS POSITIVE______    ANTIPHOSPATIDIL SERINE ANTIBODY:  POSITIVE _____ NEGATIVE _______    SERUM PROTEIN ELECTROPHORESIS AND IMMUNOFIXATION: NO MONOCLONAL PROTEIN __N______ POSITIVE MONOCLONAL PROTEIN ______ TYPE _________    C REACTIVE PROTEIN HIGH SENSIBILITY: NORMAL __Y_____ ABNORMAL :QUANTIFICATION ________    SEDIMENTATION RATE: _____ MM/H.    FIBRINOGEN LEVEL: _________ NORMAL _______  ABNORMAL _Y/465_________.    LIPID PROFILE:    CHOLESTEROL HIGH ______Y____  TRYGLYCERIDES HIGH  __Y______    HEPARIN ASSOCIATED ANTIPLATELET ANTIBODY: _NA________        Objective     There were no vitals filed for this visit.  Current Status 9/28/2021   ECOG score 0       Physical Exam  Constitutional:       General: He is not in acute distress.     Appearance: He is well-developed.   Pulmonary:      Effort: Pulmonary effort is normal. No respiratory distress.   Skin:     General: Skin is warm and dry.   Neurological:      Mental Status: He is alert and oriented to person, place, and time.       limited due to telehealth visit        RECENT LABS:  Hematology WBC   Date Value Ref Range Status   09/28/2021 6.98 3.40 - 10.80 10*3/mm3 Final   11/18/2020 6.55 3.40 - 10.80 10*3/mm3 Final     RBC   Date Value Ref Range Status   09/28/2021 5.30 4.14 - 5.80 10*6/mm3 Final   11/18/2020 5.15 4.14 - 5.80 10*6/mm3 Final     Hemoglobin   Date Value Ref Range Status   09/28/2021 15.9 13.0 - 17.7 g/dL Final     Hematocrit   Date Value Ref Range Status   09/28/2021 46.4 37.5 - 51.0 % Final     Platelets   Date Value Ref Range Status   09/28/2021 117 (L) 140 - 450 10*3/mm3 Final       CBC:    WBC   Date Value Ref Range Status   09/28/2021 6.98 3.40 - 10.80 10*3/mm3 Final   11/18/2020 6.55 3.40 - 10.80 10*3/mm3 Final     RBC   Date Value Ref Range Status   09/28/2021 5.30 4.14 - 5.80 10*6/mm3 Final   11/18/2020 5.15 4.14 - 5.80 10*6/mm3 Final     Hemoglobin   Date Value Ref Range Status   09/28/2021 15.9 13.0 - 17.7 g/dL Final     Hematocrit   Date Value Ref Range Status   09/28/2021 46.4 37.5 - 51.0 % Final     MCV   Date Value Ref Range Status   09/28/2021 87.5 79.0 - 97.0 fL Final     MCH   Date Value Ref Range Status   09/28/2021 30.0 26.6 - 33.0 pg Final     MCHC   Date Value Ref Range Status   09/28/2021 34.3 31.5 - 35.7 g/dL Final     RDW   Date Value Ref Range Status   09/28/2021 12.8 12.3 - 15.4 % Final     RDW-SD   Date Value Ref Range Status   09/28/2021 40.9 37.0 - 54.0 fl Final     MPV   Date Value Ref Range Status   09/28/2021 10.0 6.0 - 12.0 fL Final     Platelets   Date Value Ref Range Status   09/28/2021 117 (L) 140 - 450 10*3/mm3 Final     Neutrophil %   Date Value Ref Range Status   09/28/2021 59.6 42.7 - 76.0 % Final     Lymphocyte %   Date Value Ref Range Status   09/28/2021 22.1 19.6 - 45.3 % Final     Monocyte %   Date Value Ref Range Status   09/28/2021 13.8 (H) 5.0 - 12.0 % Final     Eosinophil %   Date Value Ref Range Status   09/28/2021 2.9 0.3 - 6.2 % Final     Basophil %   Date Value Ref  Range Status   09/28/2021 0.9 0.0 - 1.5 % Final     Immature Grans %   Date Value Ref Range Status   09/28/2021 0.7 (H) 0.0 - 0.5 % Final     Neutrophils, Absolute   Date Value Ref Range Status   09/28/2021 4.17 1.70 - 7.00 10*3/mm3 Final     Lymphocytes, Absolute   Date Value Ref Range Status   09/28/2021 1.54 0.70 - 3.10 10*3/mm3 Final     Monocytes, Absolute   Date Value Ref Range Status   09/28/2021 0.96 (H) 0.10 - 0.90 10*3/mm3 Final     Eosinophils, Absolute   Date Value Ref Range Status   09/28/2021 0.20 0.00 - 0.40 10*3/mm3 Final     Basophils, Absolute   Date Value Ref Range Status   09/28/2021 0.06 0.00 - 0.20 10*3/mm3 Final     Immature Grans, Absolute   Date Value Ref Range Status   09/28/2021 0.05 0.00 - 0.05 10*3/mm3 Final     nRBC   Date Value Ref Range Status   09/28/2021 0.0 0.0 - 0.2 /100 WBC Final        CMP:    Glucose   Date Value Ref Range Status   09/28/2021 92 74 - 124 mg/dL Final     BUN   Date Value Ref Range Status   09/28/2021 19 6 - 20 mg/dL Final     Creatinine   Date Value Ref Range Status   10/06/2021 1.30 0.60 - 1.30 mg/dL Final     Comment:     Serial Number: [id number]:  [id number]     Sodium   Date Value Ref Range Status   09/28/2021 138 134 - 145 mmol/L Final     Potassium   Date Value Ref Range Status   09/28/2021 4.6 3.5 - 4.7 mmol/L Final     Chloride   Date Value Ref Range Status   09/28/2021 106 98 - 107 mmol/L Final     CO2   Date Value Ref Range Status   09/28/2021 18.8 (L) 22.0 - 29.0 mmol/L Final     Calcium   Date Value Ref Range Status   09/28/2021 10.1 8.5 - 10.2 mg/dL Final     Total Protein   Date Value Ref Range Status   09/28/2021 6.6 6.3 - 8.0 g/dL Final     Albumin   Date Value Ref Range Status   09/28/2021 4.10 3.50 - 5.20 g/dL Final     ALT (SGPT)   Date Value Ref Range Status   09/28/2021 15 0 - 41 U/L Final     AST (SGOT)   Date Value Ref Range Status   09/28/2021 21 0 - 40 U/L Final     Alkaline Phosphatase   Date Value Ref Range Status   09/28/2021 87 38 -  116 U/L Final     Total Bilirubin   Date Value Ref Range Status   09/28/2021 0.8 0.2 - 1.2 mg/dL Final     eGFR  Am   Date Value Ref Range Status   11/18/2020 69 >60 mL/min/1.73 Final     Globulin   Date Value Ref Range Status   09/28/2021 2.5 1.8 - 3.5 gm/dL Final     A/G Ratio   Date Value Ref Range Status   09/28/2021 1.6 1.1 - 2.4 g/dL Final     BUN/Creatinine Ratio   Date Value Ref Range Status   09/28/2021 15.0 7.3 - 30.0 Final     Anion Gap   Date Value Ref Range Status   09/28/2021 13.2 5.0 - 15.0 mmol/L Final           Recent imaging:    CT Abdomen Pelvis With Contrast    Result Date: 10/7/2021  Narrative: CT ABDOMEN AND PELVIS WITH CONTRAST  HISTORY: Follow-up portal vein thrombus.  TECHNIQUE: Axial CT images of the abdomen and pelvis were obtained following administration of intravenous contrast. The patient was given oral contrast Coronal and sagittal reformats were obtained.  COMPARISON: 06/04/2021.  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 The left portal vein is narrowed likely related to recanalization. The right branch of the portal vein is patent and there is continued segmental thrombosis of portal venous branch best demonstrated on image 38. Recanalized paraumbilical vein with numerous collaterals demonstrated in the perigastric region as well as within the omentum. Mildly heterogenous liver attenuation. Spleen measures 11.5 cm in craniocaudal dimension without focal abnormality. The pancreas is otherwise normal without ductal dilatation. Gallbladder is unremarkable. No evidence of biliary obstruction. Bilateral adrenal gland and kidneys are unremarkable apart from simple cortical cyst within the left kidney. Smaller cortical lesions are too small to accurately characterize. The urinary bladder is partially distended and normal. The prostate gland is enlarged. No evidence of bowel obstruction. Appendix is normal. No  appreciable bowel wall thickening. Moderate calcified atherosclerotic plaque is seen within the abdominal aorta and its branches. There is no pathological retroperitoneal lymphadenopathy. No ascites.      Impression: The splenoportal confluence is completely occluded/severely narrowed with portal cavernoma formation. Numerous portosystemic collaterals are seen in the perigastric region, omentum and pericholecystic space. Narrowed left portal vein secondary to recanalization. Persistent nonenhancement/thrombus within a segmental right intrahepatic portal vein branch.  Radiation dose reduction techniques were utilized, including automated exposure control and exposure modulation based on body size.  This report was finalized on 10/7/2021 1:53 PM by Dr. Doyle Daniel M.D.       Component      Latest Ref Rng & Units 3/23/2021 6/4/2021   Beta-2 Glyco 1 IgG      0 - 20 GPI IgG units <9 <9   Beta-2 Glyco 1 IgA      0 - 25 GPI IgA units <9 <9   Beta-2 Glyco 1 IgM      0 - 32 GPI IgM units 44 (H) 45 (H)       Assessment/Plan     Diagnoses and all orders for this visit:    1. Portal vein thrombosis (Primary)    2. Hypercoagulable state (HCC)    3. Chronic anticoagulation      1.   Portal vein thrombosis diagnosed March 2021:  · Present to ER with abdominal pain, diagnosed pylephlebitis and he was placed on heparin and subsequently Eliquis and discharged home. Since discharge his abdominal pain has almost substantially improved to the point of resolution and he has no abdominal distention.   · heterozygous for factor V Leiden mutation and he has positivity for anti-glycoprotein antibodies. The rest of the thrombophilia workup stated above is negative.   · eviewed on 06/07/2021. He has not had any new manifestations of his portal vein thrombosis in fact no symptoms at all. A CT scan that I have personally reviewed has documented cavernous transformation of the portal vein and recanalization as well as multiple collateral  circulation formation. That means that the flow and the venous return from the growth into the liver is appropriately directed. The patient has not had any new thrombosis. His antiglycoprotein antibody remains positive 44 in comparison with previous assessment. He has also a lupus anticoagulant positive.   · I pointed out to the patient that given the circumstances and his factor V Leiden mutation heterozygosity he is to remain on anticoagulation for the rest of his life.     2.  Abdominal pain:   Seen 9/28/2021 for triage visit due to recent intermittent abdominal pain, epigastric and radiating to the back.  He denies any other associated pain or symptoms.  He does not feel this is reflux he does not have the classic burning up the esophagus.  He however occasionally has a raspy cough.  He has not tried any over-the-counter Prilosec or Pepcid recently.  He states in the past he did have issues with this but not recently.  He denies nausea, edema, shortness of breath, or pain with deep breath.  He states he is not missed any of his Eliquis dosing.  This is reviewed with Dr. Magdaleno today and we will proceed with a CT abdomen pelvis for evaluation due to his portal vein thrombosis.  We will also start the patient on Prilosec 20 mg daily for 1 month to see if this helps his symptoms.  CMP was performed today which was unremarkable.   · Patient was seen via telehealth visit on 10/12/2021 to review CT scans.  Prior to the visit I had reviewed and discussed his CT scans with Dr. Magdaleno who reviewed the images and felt that his CT scan was stable.  Discussed with the patient he is to remain on anticoagulation with Eliquis 5 mg every 12 hours for the remainder of his life.  We did discuss trying to space the dosing about every 12 hours.  We discussed trying Pepcid complete to see if this would help relieve his reflux symptoms as needed.  Call with worsening abdominal pain or other concerns.  We discussed eating smaller more  frequent meals.    PLAN:  1. Continue Eliquis 5 mg every 12 hours.  2. Return for follow-up visit with Dr. Magdaleno in December with repeat labs.  3. Call/return sooner should he develop any new concerns or problems.      SPARKLE Johnson    The patient provided consent for the video visit.  The patient was in [place].  I was in the office.  Telehealth was used due to the viral pandemic.

## 2021-11-17 DIAGNOSIS — E78.2 MIXED HYPERLIPIDEMIA: ICD-10-CM

## 2021-11-17 DIAGNOSIS — I10 ESSENTIAL HYPERTENSION: ICD-10-CM

## 2021-11-17 DIAGNOSIS — N40.0 PROSTATE ENLARGEMENT: ICD-10-CM

## 2021-11-18 RX ORDER — METOPROLOL SUCCINATE 50 MG/1
TABLET, EXTENDED RELEASE ORAL
Qty: 90 TABLET | Refills: 3 | Status: SHIPPED | OUTPATIENT
Start: 2021-11-18 | End: 2022-11-14

## 2021-11-18 RX ORDER — PRAVASTATIN SODIUM 80 MG/1
TABLET ORAL
Qty: 90 TABLET | Refills: 3 | Status: SHIPPED | OUTPATIENT
Start: 2021-11-18 | End: 2023-01-05 | Stop reason: SDUPTHER

## 2021-11-18 RX ORDER — TAMSULOSIN HYDROCHLORIDE 0.4 MG/1
CAPSULE ORAL
Qty: 90 CAPSULE | Refills: 3 | Status: SHIPPED | OUTPATIENT
Start: 2021-11-18 | End: 2022-11-14

## 2021-11-18 RX ORDER — AMLODIPINE BESYLATE AND BENAZEPRIL HYDROCHLORIDE 5; 20 MG/1; MG/1
CAPSULE ORAL
Qty: 90 CAPSULE | Refills: 3 | Status: SHIPPED | OUTPATIENT
Start: 2021-11-18 | End: 2023-01-05 | Stop reason: SDUPTHER

## 2021-11-22 ENCOUNTER — OFFICE VISIT (OUTPATIENT)
Dept: INTERNAL MEDICINE | Facility: CLINIC | Age: 76
End: 2021-11-22

## 2021-11-22 VITALS
OXYGEN SATURATION: 98 % | SYSTOLIC BLOOD PRESSURE: 122 MMHG | BODY MASS INDEX: 27.16 KG/M2 | HEIGHT: 71 IN | DIASTOLIC BLOOD PRESSURE: 74 MMHG | HEART RATE: 50 BPM | TEMPERATURE: 97.3 F | WEIGHT: 194 LBS

## 2021-11-22 DIAGNOSIS — Z12.5 SCREENING PSA (PROSTATE SPECIFIC ANTIGEN): ICD-10-CM

## 2021-11-22 DIAGNOSIS — Z00.00 ANNUAL PHYSICAL EXAM: ICD-10-CM

## 2021-11-22 DIAGNOSIS — I10 PRIMARY HYPERTENSION: ICD-10-CM

## 2021-11-22 DIAGNOSIS — E78.2 MIXED HYPERLIPIDEMIA: Primary | ICD-10-CM

## 2021-11-22 DIAGNOSIS — Z11.59 NEED FOR HEPATITIS C SCREENING TEST: ICD-10-CM

## 2021-11-22 DIAGNOSIS — Z13.220 SCREENING FOR LIPID DISORDERS: ICD-10-CM

## 2021-11-22 PROCEDURE — 99397 PER PM REEVAL EST PAT 65+ YR: CPT | Performed by: NURSE PRACTITIONER

## 2021-11-22 PROCEDURE — 3008F BODY MASS INDEX DOCD: CPT | Performed by: NURSE PRACTITIONER

## 2021-11-22 PROCEDURE — 2014F MENTAL STATUS ASSESS: CPT | Performed by: NURSE PRACTITIONER

## 2021-11-23 LAB
ALBUMIN SERPL-MCNC: 4.2 G/DL (ref 3.7–4.7)
ALBUMIN/GLOB SERPL: 1.9 {RATIO} (ref 1.2–2.2)
ALP SERPL-CCNC: 87 IU/L (ref 44–121)
ALT SERPL-CCNC: 16 IU/L (ref 0–44)
AST SERPL-CCNC: 18 IU/L (ref 0–40)
BASOPHILS # BLD AUTO: 0.1 X10E3/UL (ref 0–0.2)
BASOPHILS NFR BLD AUTO: 1 %
BILIRUB SERPL-MCNC: 0.6 MG/DL (ref 0–1.2)
BUN SERPL-MCNC: 20 MG/DL (ref 8–27)
BUN/CREAT SERPL: 15 (ref 10–24)
CALCIUM SERPL-MCNC: 9.8 MG/DL (ref 8.6–10.2)
CHLORIDE SERPL-SCNC: 106 MMOL/L (ref 96–106)
CHOLEST SERPL-MCNC: 150 MG/DL (ref 100–199)
CO2 SERPL-SCNC: 23 MMOL/L (ref 20–29)
CREAT SERPL-MCNC: 1.35 MG/DL (ref 0.76–1.27)
EOSINOPHIL # BLD AUTO: 0.4 X10E3/UL (ref 0–0.4)
EOSINOPHIL NFR BLD AUTO: 5 %
ERYTHROCYTE [DISTWIDTH] IN BLOOD BY AUTOMATED COUNT: 12.8 % (ref 11.6–15.4)
GLOBULIN SER CALC-MCNC: 2.2 G/DL (ref 1.5–4.5)
GLUCOSE SERPL-MCNC: 84 MG/DL (ref 65–99)
HCT VFR BLD AUTO: 47.2 % (ref 37.5–51)
HCV AB S/CO SERPL IA: 0.1 S/CO RATIO (ref 0–0.9)
HDLC SERPL-MCNC: 38 MG/DL
HGB BLD-MCNC: 16.3 G/DL (ref 13–17.7)
IMM GRANULOCYTES # BLD AUTO: 0.1 X10E3/UL (ref 0–0.1)
IMM GRANULOCYTES NFR BLD AUTO: 1 %
LDLC SERPL CALC-MCNC: 96 MG/DL (ref 0–99)
LYMPHOCYTES # BLD AUTO: 1.7 X10E3/UL (ref 0.7–3.1)
LYMPHOCYTES NFR BLD AUTO: 25 %
MCH RBC QN AUTO: 30.6 PG (ref 26.6–33)
MCHC RBC AUTO-ENTMCNC: 34.5 G/DL (ref 31.5–35.7)
MCV RBC AUTO: 89 FL (ref 79–97)
MONOCYTES # BLD AUTO: 0.8 X10E3/UL (ref 0.1–0.9)
MONOCYTES NFR BLD AUTO: 12 %
NEUTROPHILS # BLD AUTO: 3.7 X10E3/UL (ref 1.4–7)
NEUTROPHILS NFR BLD AUTO: 56 %
PLATELET # BLD AUTO: 116 X10E3/UL (ref 150–450)
POTASSIUM SERPL-SCNC: 5 MMOL/L (ref 3.5–5.2)
PROT SERPL-MCNC: 6.4 G/DL (ref 6–8.5)
RBC # BLD AUTO: 5.32 X10E6/UL (ref 4.14–5.8)
SODIUM SERPL-SCNC: 140 MMOL/L (ref 134–144)
TRIGL SERPL-MCNC: 82 MG/DL (ref 0–149)
VLDLC SERPL CALC-MCNC: 16 MG/DL (ref 5–40)
WBC # BLD AUTO: 6.6 X10E3/UL (ref 3.4–10.8)

## 2021-11-29 RX ORDER — OMEPRAZOLE 20 MG/1
CAPSULE, DELAYED RELEASE ORAL
Qty: 30 CAPSULE | Refills: 1 | Status: SHIPPED | OUTPATIENT
Start: 2021-11-29 | End: 2021-12-01 | Stop reason: SDUPTHER

## 2021-12-01 ENCOUNTER — OFFICE VISIT (OUTPATIENT)
Dept: ONCOLOGY | Facility: CLINIC | Age: 76
End: 2021-12-01

## 2021-12-01 ENCOUNTER — LAB (OUTPATIENT)
Dept: LAB | Facility: HOSPITAL | Age: 76
End: 2021-12-01

## 2021-12-01 VITALS
RESPIRATION RATE: 16 BRPM | DIASTOLIC BLOOD PRESSURE: 77 MMHG | TEMPERATURE: 97.8 F | OXYGEN SATURATION: 97 % | BODY MASS INDEX: 28.5 KG/M2 | WEIGHT: 203.6 LBS | HEART RATE: 53 BPM | SYSTOLIC BLOOD PRESSURE: 138 MMHG | HEIGHT: 71 IN

## 2021-12-01 DIAGNOSIS — D68.59 HYPERCOAGULABLE STATE (HCC): ICD-10-CM

## 2021-12-01 DIAGNOSIS — I81 PORTAL VEIN THROMBOSIS: Primary | ICD-10-CM

## 2021-12-01 DIAGNOSIS — I81 PORTAL VEIN THROMBOSIS: ICD-10-CM

## 2021-12-01 DIAGNOSIS — D69.6 THROMBOCYTOPENIA (HCC): ICD-10-CM

## 2021-12-01 LAB
BASOPHILS # BLD AUTO: 0.06 10*3/MM3 (ref 0–0.2)
BASOPHILS NFR BLD AUTO: 1 % (ref 0–1.5)
DEPRECATED RDW RBC AUTO: 41.1 FL (ref 37–54)
EOSINOPHIL # BLD AUTO: 0.26 10*3/MM3 (ref 0–0.4)
EOSINOPHIL NFR BLD AUTO: 4.3 % (ref 0.3–6.2)
ERYTHROCYTE [DISTWIDTH] IN BLOOD BY AUTOMATED COUNT: 12.5 % (ref 12.3–15.4)
HCT VFR BLD AUTO: 45.6 % (ref 37.5–51)
HGB BLD-MCNC: 15.6 G/DL (ref 13–17.7)
IMM GRANULOCYTES # BLD AUTO: 0.03 10*3/MM3 (ref 0–0.05)
IMM GRANULOCYTES NFR BLD AUTO: 0.5 % (ref 0–0.5)
LYMPHOCYTES # BLD AUTO: 1.5 10*3/MM3 (ref 0.7–3.1)
LYMPHOCYTES NFR BLD AUTO: 24.8 % (ref 19.6–45.3)
MCH RBC QN AUTO: 30.5 PG (ref 26.6–33)
MCHC RBC AUTO-ENTMCNC: 34.2 G/DL (ref 31.5–35.7)
MCV RBC AUTO: 89.2 FL (ref 79–97)
MONOCYTES # BLD AUTO: 0.74 10*3/MM3 (ref 0.1–0.9)
MONOCYTES NFR BLD AUTO: 12.2 % (ref 5–12)
NEUTROPHILS NFR BLD AUTO: 3.46 10*3/MM3 (ref 1.7–7)
NEUTROPHILS NFR BLD AUTO: 57.2 % (ref 42.7–76)
NRBC BLD AUTO-RTO: 0 /100 WBC (ref 0–0.2)
PLATELET # BLD AUTO: 103 10*3/MM3 (ref 140–450)
PMV BLD AUTO: 9.5 FL (ref 6–12)
RBC # BLD AUTO: 5.11 10*6/MM3 (ref 4.14–5.8)
WBC NRBC COR # BLD: 6.05 10*3/MM3 (ref 3.4–10.8)

## 2021-12-01 PROCEDURE — 36415 COLL VENOUS BLD VENIPUNCTURE: CPT

## 2021-12-01 PROCEDURE — 85025 COMPLETE CBC W/AUTO DIFF WBC: CPT

## 2021-12-01 PROCEDURE — 99213 OFFICE O/P EST LOW 20 MIN: CPT | Performed by: INTERNAL MEDICINE

## 2021-12-01 RX ORDER — OMEPRAZOLE 20 MG/1
20 CAPSULE, DELAYED RELEASE ORAL DAILY
Qty: 90 CAPSULE | Refills: 3 | Status: SHIPPED | OUTPATIENT
Start: 2021-12-01 | End: 2021-12-15 | Stop reason: SDUPTHER

## 2021-12-01 NOTE — PROGRESS NOTES
Subjective   This was an audio and video enabled telemedicine encounter.    REASONS FOR FOLLOWUP: pylephlebitis, FACTOR V LEIDEN MUTATION, ANTIGLYCOPROTEIN ANTIBODY POSITIVE     HISTORY OF PRESENT ILLNESS:      This patient returns today to the office for follow up of the above diagnosis. In the meantime he continues taking care of his ill wife and he is also taking care of his elderly mother 95 who has had a pacemaker and lives in a nursing facility. Overall the patient is not having any abdominal pain, the Prilosec has made a big difference in regard to symptomatology pertinent to this. His appetite is very good, he has not had any heartburn or indigestion, normal bowel activity, normal urination, no jaundice, no distention. He has not had any new health issues at this time.         Past Medical History:   Diagnosis Date   • Abdominal pain    • Arteriosclerotic cardiovascular disease    • Diverticulosis    • Dysphagia    • GERD (gastroesophageal reflux disease)    • H/O Interstitial cystitis    • Hyperlipidemia    • Hypertension    • Internal hemorrhoids    • Prostate enlargement    • Renal cyst, right    • Umbilical hernia         Past Surgical History:   Procedure Laterality Date   • ACHILLES TENDON REPAIR      ruptured tendon   • COLONOSCOPY N/A 2012    Dr Alvarado/[facility]   • SHOULDER SURGERY     • TONSILLECTOMY          Current Outpatient Medications on File Prior to Visit   Medication Sig Dispense Refill   • amLODIPine-benazepril (LOTREL 5-20) 5-20 MG per capsule TAKE 1 CAPSULE DAILY 90 capsule 3   • Coenzyme Q10 150 MG capsule Take 1 tablet by mouth Every Night.     • Eliquis 5 MG tablet tablet TAKE 1 TABLET EVERY 12 HOURS 60 tablet 11   • Melatonin 3 MG capsule Take 1 capsule by mouth Every Night.     • metoprolol succinate XL (TOPROL-XL) 50 MG 24 hr tablet TAKE 1 TABLET DAILY 90 tablet 3   • omeprazole (priLOSEC) 20 MG capsule TAKE ONE CAPSULE BY MOUTH DAILY 30 capsule 1   • pravastatin (PRAVACHOL) 80 MG  tablet TAKE 1 TABLET EVERY NIGHT 90 tablet 3   • tamsulosin (FLOMAX) 0.4 MG capsule 24 hr capsule TAKE 1 CAPSULE DAILY 90 capsule 3     No current facility-administered medications on file prior to visit.        ALLERGIES:    Allergies   Allergen Reactions   • Sulfamethoxazole-Trimethoprim Itching     Abdominal pain as well         Social History     Socioeconomic History   • Marital status:      Spouse name: Amarilys   Tobacco Use   • Smoking status: Former Smoker   • Smokeless tobacco: Never Used   Substance and Sexual Activity   • Alcohol use: No   • Drug use: No   • Sexual activity: Defer        Family History   Problem Relation Age of Onset   • Heart attack Father    • Heart disease Father    • Hypertension Mother         HEMATOLOGY HISTORY:  75-year-old white male who has had abdominal pain for almost 1 month in the right upper quadrant and has been progressively worse and becoming almost epigastric, especially in the morning with an intensity of 7 out of 10. He has occasional nausea but no vomiting. He has sensation of distended abdomen. He has not had excessive burping or belching and neither flatulence. He has been having normal bowel activity with no passage of blood in the stool. No diarrhea. He has not developed any jaundice. Urination has remained normal. He has had minimal swelling in his lower extremities and he states his feet remain cold all the time. Given the persistency of his symptoms and given the fact that he was seen at an urgent care center at [facility] and advised to be seen by Gastroenterology at [place] and given an appointment for 6 weeks, he decided to come to the emergency room. At the time of the emergency room visit, a CT scan of the abdomen was done that documented extensive thrombosis of the portal vein. The extension of the thrombosis was into the splenic vein into the superior mesenteric vein. The patient was placed immediately on heparin. Since then the symptoms have  improved some. He has no nausea or vomiting this morning. His abdomen still remains kind of distended but he has had good bowel activity this morning with no passage of blood in the stool. He has not had any fever, chills or sweats. He denies any cough, sputum production or shortness of breath. He has no bone pain. No joint pain. No rash in the skin. No alopecia. No sores in his mouth and no lesions in the skin otherwise like livedo reticularis. The patient denies any neurological symptoms. He denies any changes in performance status. His weight has remained stable. He is very much indispensable at home. His wife has multiple comorbidities and he is the only one that is able to handle her.    THROMBOPHILIA  CLINICAL AND LABORATORY REPORT:  DATE: 4/ /16 /21     DIAGNOSIS: PYLEPHLEBITIS 3/2021      KEY: P EQUAL TO POSITIVE  , N  EQUAL TO NEGATIVE.    PREPARED BY SENTHIL KELLEY MD STAYS IN THE CHART PERMANENTLY      CLINICAL FACTORS:     OBESITY: ___Y___     DIABETES: __N____.    IMMOBILITY: __N____.    SMOKING HISTORY : ____N________ PACK A DAY/ YEARS ________    LONG TRIPS BY CAR OR AIRPLANE: ___N_____    RECENT SURGERY: ___N_______   LOCATION: ________    TRAUMA: ____N____    MEDICATIONS:   BIRTH CONTROL MEDICATIONS ____N____ ANDROGENS _N______ ESTROGENS __N______ HEPARIN N________ OTHERS ____N___    PREGNANCY:   ____N____.    FAMILY HISTORY OF BLOOD CLOTHS:    POSITIVE:   ________ NEGATIVE __N_____    HEMATOLOGIC DISORDER:  POLYCYTHEMIA VERA ___N____ THROMBOCYTOSIS _N_____    DIC:___N_____  TTP ___N_______    COLLAGEN VASCULAR DISEASE: __N___   DIAGNOSIS: ________________________.    VASCULITIS: TYPE AND LOCATION ____N___________ BIOPSY PROVEN:____________    CANCER DIAGNOSIS: ___N____    TYPE ________    CANCER SCREENING:  BREAST _______ COLON ___Y_____ LUNG ________ GENITOURINARY ___Y______   CT SCANS _____Y______ NORMAL ______ ABNORMAL ________    INDWELLING VASCULAR DEVICE:  ____N_____. LOCATION  "_________    INFLAMMATORY BOWEL DISEASE: ULCERATIVE COLITIS ______N____   CHRON'S DISEASE _____N______    RECENT COVID 19 INFECTION ______N_________.          LABORATORY:    FACTOR V LEIDEN MUTATION: POSITIVE   _Y____ NEGATIVE _____ HETEROZYGOUS __Y______ HOMOZYGOUS ________    PROTHROMBIN GENE MUTATION:  POSITIVE _____ NEGATIVE _N____    ANTITHROMBIN III : NORMAL ___N__   ABNORMAL ______ QUANTIFICATION: _______    PROTEIN S ANTIGEN _____N_____ PROTEIN S FUNCTIONAL ___N_____ PROTEIN C _N________     LUPUS ANTICOAGULANT: POSITIVE _____  NEGATIVE _N____. REPEATED  6 WEEKS LATER ____YES POSITIVE__    ANTICARDIOLIPIN ANTIBODY PROFILE : POSITIVE ______  NEGATIVE _N_____  IGG _____ IGM _____ REPEATED 6 WEEKS LATER _________    ANTI GLYCOPROTEIN ANTIBODY:  POSITIVE __Y___  NEGATIVE  _______   IGG _____ IGM _Y/19____ REPEATED 12 WEEKS LATER __Y IT REMAINS POSITIVE______    ANTIPHOSPATIDIL SERINE ANTIBODY:  POSITIVE _____ NEGATIVE _______    SERUM PROTEIN ELECTROPHORESIS AND IMMUNOFIXATION: NO MONOCLONAL PROTEIN __N______ POSITIVE MONOCLONAL PROTEIN ______ TYPE _________    C REACTIVE PROTEIN HIGH SENSIBILITY: NORMAL __Y_____ ABNORMAL :QUANTIFICATION ________    SEDIMENTATION RATE: _____ MM/H.    FIBRINOGEN LEVEL: _________ NORMAL _______  ABNORMAL _Y/465_________.    LIPID PROFILE:    CHOLESTEROL HIGH ______Y____  TRYGLYCERIDES HIGH  __Y______    HEPARIN ASSOCIATED ANTIPLATELET ANTIBODY: _NA________        Objective     Vitals:    12/01/21 1259   BP: 138/77   Pulse: 53   Resp: 16   Temp: 97.8 °F (36.6 °C)   TempSrc: Temporal   SpO2: 97%   Weight: 92.4 kg (203 lb 9.6 oz)   Height: 180.3 cm (70.98\")   PainSc: 0-No pain     Current Status 9/28/2021   ECOG score 0     Exam: I HAVE PERSONALLY REVIEWED THE HISTORY OF THE PRESENT ILLNESS, PAST MEDICAL HISTORY, FAMILY HISTORY, SOCIAL HISTORY, ALLERGIES, MEDICATIONS STATED ABOVE IN THE  NOTE FROM TODAY.        GENERAL:  Well-developed, well-nourished  Patient  in no acute distress. "   SKIN:  Warm, dry ,NO rashes,NO purpura ,NO petechiae.  HEENT:  Pupils were equal and reactive to light and accomodation, conjunctivae noninjected, no pterygium, normal extraocular movements, normal visual acuity.   NECK:  Supple with good range of motion; no thyromegaly , no other masses, no JVD or bruits, no cervical adenopathies.No carotid artery pain, no carotid abnormal pulsation , NO arterial dance.  LYMPHATICS:  No cervical, NO supraclavicular, NO axillary,NO epitrochlear , NO inguinal adenopathy.  CARDIAC   normal rate and regular rhythm, without murmur,NO rubs NO S3 NO S4 right or left .  LUNGS: normal breath sounds bilateral, no wheezing, rhonchi, crackles or rubs.  VASCULAR VENOUS: no cyanosis, collateral circulation, varicosities, edema, palpable cords, pain, erythema.  ABDOMEN:  Soft, nontender with no hepatomegaly, no splenomegaly,no masses, no ascites, no collateral circulation,no distention,no Clyde sign.  EXTREMITIES  AND SPINE:  No clubbing, cyanosis or edema, no deformities , no pain .No kyphosis, scoliosis, no other deformities, no pain in spine, no pain in ribs , no pain inpelvic bone.  NEUROLOGICAL:  Patient was awake, alert, oriented to time, person and place.        RECENT LABS:  Hematology WBC   Date Value Ref Range Status   12/01/2021 6.05 3.40 - 10.80 10*3/mm3 Final   11/22/2021 6.6 3.4 - 10.8 x10E3/uL Final     RBC   Date Value Ref Range Status   12/01/2021 5.11 4.14 - 5.80 10*6/mm3 Final   11/22/2021 5.32 4.14 - 5.80 x10E6/uL Final     Hemoglobin   Date Value Ref Range Status   12/01/2021 15.6 13.0 - 17.7 g/dL Final     Hematocrit   Date Value Ref Range Status   12/01/2021 45.6 37.5 - 51.0 % Final     Platelets   Date Value Ref Range Status   12/01/2021 103 (L) 140 - 450 10*3/mm3 Final       CBC:    WBC   Date Value Ref Range Status   12/01/2021 6.05 3.40 - 10.80 10*3/mm3 Final   11/22/2021 6.6 3.4 - 10.8 x10E3/uL Final     RBC   Date Value Ref Range Status   12/01/2021 5.11 4.14 -  5.80 10*6/mm3 Final   11/22/2021 5.32 4.14 - 5.80 x10E6/uL Final     Hemoglobin   Date Value Ref Range Status   12/01/2021 15.6 13.0 - 17.7 g/dL Final     Hematocrit   Date Value Ref Range Status   12/01/2021 45.6 37.5 - 51.0 % Final     MCV   Date Value Ref Range Status   12/01/2021 89.2 79.0 - 97.0 fL Final     MCH   Date Value Ref Range Status   12/01/2021 30.5 26.6 - 33.0 pg Final     MCHC   Date Value Ref Range Status   12/01/2021 34.2 31.5 - 35.7 g/dL Final     RDW   Date Value Ref Range Status   12/01/2021 12.5 12.3 - 15.4 % Final     RDW-SD   Date Value Ref Range Status   12/01/2021 41.1 37.0 - 54.0 fl Final     MPV   Date Value Ref Range Status   12/01/2021 9.5 6.0 - 12.0 fL Final     Platelets   Date Value Ref Range Status   12/01/2021 103 (L) 140 - 450 10*3/mm3 Final     Neutrophil %   Date Value Ref Range Status   12/01/2021 57.2 42.7 - 76.0 % Final     Lymphocyte %   Date Value Ref Range Status   12/01/2021 24.8 19.6 - 45.3 % Final     Monocyte %   Date Value Ref Range Status   12/01/2021 12.2 (H) 5.0 - 12.0 % Final     Eosinophil %   Date Value Ref Range Status   12/01/2021 4.3 0.3 - 6.2 % Final     Basophil %   Date Value Ref Range Status   12/01/2021 1.0 0.0 - 1.5 % Final     Immature Grans %   Date Value Ref Range Status   12/01/2021 0.5 0.0 - 0.5 % Final     Neutrophils, Absolute   Date Value Ref Range Status   12/01/2021 3.46 1.70 - 7.00 10*3/mm3 Final     Lymphocytes, Absolute   Date Value Ref Range Status   12/01/2021 1.50 0.70 - 3.10 10*3/mm3 Final     Monocytes, Absolute   Date Value Ref Range Status   12/01/2021 0.74 0.10 - 0.90 10*3/mm3 Final     Eosinophils, Absolute   Date Value Ref Range Status   12/01/2021 0.26 0.00 - 0.40 10*3/mm3 Final     Basophils, Absolute   Date Value Ref Range Status   12/01/2021 0.06 0.00 - 0.20 10*3/mm3 Final     Immature Grans, Absolute   Date Value Ref Range Status   12/01/2021 0.03 0.00 - 0.05 10*3/mm3 Final     nRBC   Date Value Ref Range Status   12/01/2021  0.0 0.0 - 0.2 /100 WBC Final        CMP:    Glucose   Date Value Ref Range Status   11/22/2021 84 65 - 99 mg/dL Final   09/28/2021 92 74 - 124 mg/dL Final     BUN   Date Value Ref Range Status   11/22/2021 20 8 - 27 mg/dL Final   09/28/2021 19 6 - 20 mg/dL Final     Creatinine   Date Value Ref Range Status   11/22/2021 1.35 (H) 0.76 - 1.27 mg/dL Final   10/06/2021 1.30 0.60 - 1.30 mg/dL Final     Comment:     Serial Number: [id number]:  [id number]     Sodium   Date Value Ref Range Status   11/22/2021 140 134 - 144 mmol/L Final   09/28/2021 138 134 - 145 mmol/L Final     Potassium   Date Value Ref Range Status   11/22/2021 5.0 3.5 - 5.2 mmol/L Final   09/28/2021 4.6 3.5 - 4.7 mmol/L Final     Chloride   Date Value Ref Range Status   11/22/2021 106 96 - 106 mmol/L Final   09/28/2021 106 98 - 107 mmol/L Final     CO2   Date Value Ref Range Status   09/28/2021 18.8 (L) 22.0 - 29.0 mmol/L Final     Total CO2   Date Value Ref Range Status   11/22/2021 23 20 - 29 mmol/L Final     Calcium   Date Value Ref Range Status   11/22/2021 9.8 8.6 - 10.2 mg/dL Final   09/28/2021 10.1 8.5 - 10.2 mg/dL Final     Total Protein   Date Value Ref Range Status   09/28/2021 6.6 6.3 - 8.0 g/dL Final     Albumin   Date Value Ref Range Status   11/22/2021 4.2 3.7 - 4.7 g/dL Final   09/28/2021 4.10 3.50 - 5.20 g/dL Final     ALT (SGPT)   Date Value Ref Range Status   11/22/2021 16 0 - 44 IU/L Final   09/28/2021 15 0 - 41 U/L Final     AST (SGOT)   Date Value Ref Range Status   11/22/2021 18 0 - 40 IU/L Final   09/28/2021 21 0 - 40 U/L Final     Alkaline Phosphatase   Date Value Ref Range Status   11/22/2021 87 44 - 121 IU/L Final     Comment:                   **Please note reference interval change**   09/28/2021 87 38 - 116 U/L Final     Total Bilirubin   Date Value Ref Range Status   11/22/2021 0.6 0.0 - 1.2 mg/dL Final   09/28/2021 0.8 0.2 - 1.2 mg/dL Final     eGFR  Am   Date Value Ref Range Status   11/22/2021 59 (L) >59  mL/min/1.73 Final     Comment:     **In accordance with recommendations from the NKF-ASN Task force,**    [facility] is in the process of updating its eGFR calculation to the    2021 CKD-EPI creatinine equation that estimates kidney function    without a race variable.       Globulin   Date Value Ref Range Status   09/28/2021 2.5 1.8 - 3.5 gm/dL Final     A/G Ratio   Date Value Ref Range Status   09/28/2021 1.6 1.1 - 2.4 g/dL Final     BUN/Creatinine Ratio   Date Value Ref Range Status   11/22/2021 15 10 - 24 Final   09/28/2021 15.0 7.3 - 30.0 Final     Anion Gap   Date Value Ref Range Status   09/28/2021 13.2 5.0 - 15.0 mmol/L Final         CT ABDOMEN AND PELVIS WITH CONTRAST     HISTORY: Follow-up portal vein thrombus.     TECHNIQUE: Axial CT images of the abdomen and pelvis were obtained  following administration of intravenous contrast. The patient was given  oral contrast Coronal and sagittal reformats were obtained.     COMPARISON: 06/04/2021.     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 The left portal vein is  narrowed likely related to recanalization. The right branch of the  portal vein is patent and there is continued segmental thrombosis of  portal venous branch best demonstrated on image 38. Recanalized  paraumbilical vein with numerous collaterals demonstrated in the  perigastric region as well as within the omentum. Mildly heterogenous  liver attenuation. Spleen measures 11.5 cm in craniocaudal dimension  without focal abnormality. The pancreas is otherwise normal without  ductal dilatation. Gallbladder is unremarkable. No evidence of biliary  obstruction. Bilateral adrenal gland and kidneys are unremarkable apart  from simple cortical cyst within the left kidney. Smaller cortical  lesions are too small to accurately characterize. The urinary bladder is  partially distended and normal. The prostate gland is enlarged. No  evidence of  bowel obstruction. Appendix is normal. No appreciable bowel  wall thickening. Moderate calcified atherosclerotic plaque is seen  within the abdominal aorta and its branches. There is no pathological  retroperitoneal lymphadenopathy. No ascites.     IMPRESSION:  The splenoportal confluence is completely occluded/severely  narrowed with portal cavernoma formation. Numerous portosystemic  collaterals are seen in the perigastric region, omentum and  pericholecystic space. Narrowed left portal vein secondary to  recanalization. Persistent nonenhancement/thrombus within a segmental  right intrahepatic portal vein branch.     Radiation dose reduction techniques were utilized, including automated  exposure control and exposure modulation based on body size.     This report was finalized on 10/7/2021 1:53 PM by Dr. Doyle Daniel M.D.             Assessment/Plan     There are no diagnoses linked to this encounter.  1.   Portal vein thrombosis diagnosed March 2021:  · Present to ER with abdominal pain, diagnosed pylephlebitis and he was placed on heparin and subsequently Eliquis and discharged home. Since discharge his abdominal pain has almost substantially improved to the point of resolution and he has no abdominal distention.   · heterozygous for factor V Leiden mutation and he has positivity for anti-glycoprotein antibodies. The rest of the thrombophilia workup stated above is negative.   · eviewed on 06/07/2021. He has not had any new manifestations of his portal vein thrombosis in fact no symptoms at all. A CT scan that I have personally reviewed has documented cavernous transformation of the portal vein and recanalization as well as multiple collateral circulation formation. That means that the flow and the venous return from the growth into the liver is appropriately directed. The patient has not had any new thrombosis. His antiglycoprotein antibody remains positive 44 in comparison with previous assessment. He has  also a lupus anticoagulant positive.   · I pointed out to the patient that given the circumstances and his factor V Leiden mutation heterozygosity he is to remain on anticoagulation for the rest of his life.   The patient was reviewed on 12/01/2021. He has not had any further GI symptomatology. The Prilosec has made a big difference in regard to his issues. He has no abdominal distention. His physical exam disclosed no abdominal wall collateral circulation but radiologically his CT scan posted above documents abundant collaterals surrounding the portal system. There are no alterations in the pancreas, gallbladder, liver, spleen, kidney anatomies. Aorta with minimal atherosclerosis, no retroperitoneal adenopathy, no ascites.     I advised him to remain on the Eliquis obviously staying away from trauma.     ·   2.  Abdominal pain:   Seen 9/28/2021 for triage visit due to recent intermittent abdominal pain, epigastric and radiating to the back.  He denies any other associated pain or symptoms.  He does not feel this is reflux he does not have the classic burning up the esophagus.  He however occasionally has a raspy cough.  He has not tried any over-the-counter Prilosec or Pepcid recently.  He states in the past he did have issues with this but not recently.  He denies nausea, edema, shortness of breath, or pain with deep breath.  He states he is not missed any of his Eliquis dosing.  This is reviewed with Dr. Magdaleno today and we will proceed with a CT abdomen pelvis for evaluation due to his portal vein thrombosis.  We will also start the patient on Prilosec 20 mg daily for 1 month to see if this helps his symptoms.  CMP was performed today which was unremarkable.   · Patient was seen via telehealth visit on 10/12/2021 to review CT scans.  Prior to the visit I had reviewed and discussed his CT scans with Dr. Magdaleno who reviewed the images and felt that his CT scan was stable.  Discussed with the patient he is to  remain on anticoagulation with Eliquis 5 mg every 12 hours for the remainder of his life.  We did discuss trying to space the dosing about every 12 hours.  We discussed trying Pepcid complete to see if this would help relieve his reflux symptoms as needed.  Call with worsening abdominal pain or other concerns.  We discussed eating smaller more frequent meals.  The Prilosec has made a big difference in regard to his abdominal symptomatology and he will remain on the present dose 20 mg a day for the rest of the year. He will have a new prescription sent to [facility] in this regard as well. Otherwise no other new issues for him. I advised him to stay away from trauma and minimize any potentiality for falls during winter months, frozen steps, ice and snow.    We will review him back in 6 months with a CBC.           Danie Magdaleno MD

## 2021-12-03 LAB
B2 GLYCOPROT1 IGA SER-ACNC: <9 GPI IGA UNITS (ref 0–25)
B2 GLYCOPROT1 IGG SER-ACNC: <9 GPI IGG UNITS (ref 0–20)
B2 GLYCOPROT1 IGM SER-ACNC: 38 GPI IGM UNITS (ref 0–32)

## 2021-12-07 NOTE — ASSESSMENT & PLAN NOTE
Lipid abnormalities are unchanged.  Pharmacotherapy as ordered.  Lipids will be reassessed at your next office visit/annual physcial.    Patient currently takes pravastatin 80 mg daily and he denies any leg cramps at bedtime or any other side effects of the medication.

## 2021-12-07 NOTE — ASSESSMENT & PLAN NOTE
Hypertension is improving with treatment.  Continue current medications.  Blood pressure will be reassessed at the next regular appointment.    Patient is currently taking metoprolol succinate XL 50 mg q 24 tablet and amlodipine-benazepril 5-20mg every day. His current B/P is 122/74 and he denies any side effects of the medications.

## 2021-12-15 ENCOUNTER — TELEPHONE (OUTPATIENT)
Dept: ONCOLOGY | Facility: CLINIC | Age: 76
End: 2021-12-15

## 2021-12-15 RX ORDER — OMEPRAZOLE 20 MG/1
20 CAPSULE, DELAYED RELEASE ORAL DAILY
Qty: 90 CAPSULE | Refills: 3 | Status: SHIPPED | OUTPATIENT
Start: 2021-12-15 | End: 2022-05-26

## 2021-12-15 NOTE — TELEPHONE ENCOUNTER
Caller: Timi Wolf    Relationship: Self    Best call back number: [phone]    What is the best time to reach you: ASAP    Who are you requesting to speak with (clinical staff, provider,  specific staff member): NURSE    Do you know the name of the person who called:     What was the call regarding: PT SAYS PHARM DIDN'T GET PRILOSEC RX    Do you require a callback: YES

## 2022-05-16 ENCOUNTER — TELEPHONE (OUTPATIENT)
Dept: ONCOLOGY | Facility: CLINIC | Age: 77
End: 2022-05-16

## 2022-05-16 NOTE — TELEPHONE ENCOUNTER
Returned call to patient who is reporting intermittent difficulty swallowing.  He stated that he feels like when he swallows, that whatever he is swallowing gets stuck midway down.  It eventually goes down, but he is wanting to know if he needs to be seen sooner than scheduled.  I questioned if he has a Gastroenterologist and he stated that he sees Dr. Alvarado.  I recommended he contact their office and explain the symptoms to them.  He still wants to know what Dr. Magdaleno thinks.

## 2022-05-16 NOTE — TELEPHONE ENCOUNTER
Discussed w/ Rasta that he could come in to see NP. Called patient back and he already has an appt scheduled next week w/ Dr. Alvarado's NP. Pt will call us back after this appointment if a meeting with us is still warranted. aDrlyn Hopkins RN

## 2022-05-26 ENCOUNTER — OFFICE VISIT (OUTPATIENT)
Dept: GASTROENTEROLOGY | Facility: CLINIC | Age: 77
End: 2022-05-26

## 2022-05-26 VITALS
SYSTOLIC BLOOD PRESSURE: 125 MMHG | WEIGHT: 198.6 LBS | TEMPERATURE: 98.4 F | BODY MASS INDEX: 27.8 KG/M2 | DIASTOLIC BLOOD PRESSURE: 74 MMHG | HEIGHT: 71 IN

## 2022-05-26 DIAGNOSIS — R10.13 EPIGASTRIC PAIN: ICD-10-CM

## 2022-05-26 DIAGNOSIS — R13.10 DYSPHAGIA, UNSPECIFIED TYPE: Primary | ICD-10-CM

## 2022-05-26 DIAGNOSIS — R12 HEARTBURN: ICD-10-CM

## 2022-05-26 PROCEDURE — 99214 OFFICE O/P EST MOD 30 MIN: CPT | Performed by: NURSE PRACTITIONER

## 2022-05-26 RX ORDER — PANTOPRAZOLE SODIUM 40 MG/1
40 TABLET, DELAYED RELEASE ORAL DAILY
Qty: 90 TABLET | Refills: 3 | Status: SHIPPED | OUTPATIENT
Start: 2022-05-26 | End: 2022-09-29 | Stop reason: SDUPTHER

## 2022-05-26 NOTE — PROGRESS NOTES
Chief Complaint   Patient presents with   • Abdominal Pain       HPI    Timi Wolf is a  76 y.o. male here as care as a new patient for complaints of epigastric pain, heartburn, and esophageal dysphagia.    Patient reports being seen by Dr. Alvarado greater than 12 years ago.    Onset of current symptoms approximately 6 weeks ago.  Epigastric pain described as a burning sensation that comes and goes.  Intermittent heartburn.  Describes esophageal dysphagia with solids feels food lodge at the sternal notch and the xiphoid process.  Issues with solids only.  Drinks water for relief.  Denies regurgitation.  He was started on Prilosec 20 mg once daily 2 months ago.  Appetite is good.  His weight stable.    No diarrhea, constipation, rectal bleeding.    Patient reports normal colonoscopy in 2015.  He reports negative Cologuard 1 year ago.    No family history of colon cancer.    Past Medical History:   Diagnosis Date   • Abdominal pain    • Arteriosclerotic cardiovascular disease    • Diverticulosis    • Dysphagia    • GERD (gastroesophageal reflux disease)    • H/O Interstitial cystitis    • Hyperlipidemia    • Hypertension    • Internal hemorrhoids    • Prostate enlargement    • Renal cyst, right    • Umbilical hernia        Past Surgical History:   Procedure Laterality Date   • ACHILLES TENDON REPAIR      ruptured tendon   • COLONOSCOPY N/A 2012    Dr Alvarado/[facility]   • SHOULDER SURGERY     • TONSILLECTOMY         Scheduled Meds:     Continuous Infusions: No current facility-administered medications for this visit.      PRN Meds:     Allergies   Allergen Reactions   • Sulfamethoxazole-Trimethoprim Itching     Abdominal pain as well        Social History     Socioeconomic History   • Marital status:      Spouse name: Amarilys   Tobacco Use   • Smoking status: Former Smoker   • Smokeless tobacco: Never Used   Substance and Sexual Activity   • Alcohol use: No   • Drug use: No   • Sexual activity: Defer       Family  History   Problem Relation Age of Onset   • Heart attack Father    • Heart disease Father    • Hypertension Mother        Review of Systems   Constitutional: Negative for activity change, appetite change, fatigue and unexpected weight change.   HENT: Positive for trouble swallowing.    Eyes: Negative.    Respiratory: Negative.    Cardiovascular: Negative.    Gastrointestinal: Positive for abdominal pain. Negative for abdominal distention, anal bleeding, blood in stool, constipation, diarrhea, nausea, rectal pain and vomiting.   Endocrine: Negative.    Genitourinary: Negative.    Musculoskeletal: Negative.    Allergic/Immunologic: Negative.    Neurological: Negative.    Hematological: Negative.    Psychiatric/Behavioral: Negative.        Vitals:    05/26/22 1332   BP: 125/74   Temp: 98.4 °F (36.9 °C)       Physical Exam  Constitutional:       Appearance: He is well-developed.   Abdominal:      General: Bowel sounds are normal. There is no distension.      Palpations: Abdomen is soft. There is no mass.      Tenderness: There is no abdominal tenderness. There is no guarding.      Hernia: No hernia is present.   Skin:     General: Skin is warm and dry.      Capillary Refill: Capillary refill takes less than 2 seconds.   Neurological:      Mental Status: He is alert and oriented to person, place, and time.   Psychiatric:         Behavior: Behavior normal.     Assessment    Diagnoses and all orders for this visit:    1. Dysphagia, unspecified type (Primary)  -     CBC & Differential  -     Comprehensive Metabolic Panel  -     Celiac Comprehensive Panel  -     Case Request; Standing  -     Case Request    2. Epigastric pain  -     CBC & Differential  -     Comprehensive Metabolic Panel  -     Celiac Comprehensive Panel  -     Case Request; Standing  -     Case Request    3. Heartburn  -     CBC & Differential  -     Comprehensive Metabolic Panel  -     Celiac Comprehensive Panel  -     Case Request; Standing  -     Case  Request    Other orders  -     pantoprazole (PROTONIX) 40 MG EC tablet; Take 1 tablet by mouth Daily.  Dispense: 90 tablet; Refill: 3      Plan    Arrange EGD with Dr. Alvarado for the above-mentioned symptoms.  Differential diagnosis for the dysphagia includes GERD, peptic stricture, ring/band, eosinophilic esophagitis, or esophageal dysmotility or hypersensitivity, malignancy. We will proceed with EGD with biopsies of the distal and mid esophagus, and possible empiric dilation of the esophagus.   Stop omeprazole.  Start Protonix 40 mg once daily.  Labs today as above.  Strict antireflux diet.  Antireflux measures and dietary modifications reviewed. Low acid diet reviewed. Keep head of bed elevated. Stop eating/drinking at least 3 hours prior to bedtime. Eliminate caffeine and carbonated beverages.  Weight loss encouraged if BMI over 25.  Colonoscopy for screening purposes 2025.  Follow-up and further recommendations pending the aforementioned work-up.          SPARKLE Lemus  [facility]  [address] [place] [address]  Office: [phone]

## 2022-05-27 LAB
ALBUMIN SERPL-MCNC: 4.1 G/DL (ref 3.7–4.7)
ALBUMIN/GLOB SERPL: 2.1 {RATIO} (ref 1.2–2.2)
ALP SERPL-CCNC: 82 IU/L (ref 44–121)
ALT SERPL-CCNC: 14 IU/L (ref 0–44)
AST SERPL-CCNC: 15 IU/L (ref 0–40)
BASOPHILS # BLD AUTO: 0.1 X10E3/UL (ref 0–0.2)
BASOPHILS NFR BLD AUTO: 1 %
BILIRUB SERPL-MCNC: 0.5 MG/DL (ref 0–1.2)
BUN SERPL-MCNC: 20 MG/DL (ref 8–27)
BUN/CREAT SERPL: 13 (ref 10–24)
CALCIUM SERPL-MCNC: 9.5 MG/DL (ref 8.6–10.2)
CHLORIDE SERPL-SCNC: 106 MMOL/L (ref 96–106)
CO2 SERPL-SCNC: 21 MMOL/L (ref 20–29)
CREAT SERPL-MCNC: 1.52 MG/DL (ref 0.76–1.27)
EGFRCR SERPLBLD CKD-EPI 2021: 48 ML/MIN/1.73
ENDOMYSIUM IGA SER QL: NEGATIVE
EOSINOPHIL # BLD AUTO: 1.1 X10E3/UL (ref 0–0.4)
EOSINOPHIL NFR BLD AUTO: 14 %
ERYTHROCYTE [DISTWIDTH] IN BLOOD BY AUTOMATED COUNT: 12.9 % (ref 11.6–15.4)
GLIADIN PEPTIDE IGA SER-ACNC: 5 UNITS (ref 0–19)
GLIADIN PEPTIDE IGG SER-ACNC: 2 UNITS (ref 0–19)
GLOBULIN SER CALC-MCNC: 2 G/DL (ref 1.5–4.5)
GLUCOSE SERPL-MCNC: 114 MG/DL (ref 65–99)
HCT VFR BLD AUTO: 45.2 % (ref 37.5–51)
HGB BLD-MCNC: 15.3 G/DL (ref 13–17.7)
IGA SERPL-MCNC: 135 MG/DL (ref 61–437)
IMM GRANULOCYTES # BLD AUTO: 0.1 X10E3/UL (ref 0–0.1)
IMM GRANULOCYTES NFR BLD AUTO: 1 %
LYMPHOCYTES # BLD AUTO: 2 X10E3/UL (ref 0.7–3.1)
LYMPHOCYTES NFR BLD AUTO: 26 %
MCH RBC QN AUTO: 30.1 PG (ref 26.6–33)
MCHC RBC AUTO-ENTMCNC: 33.8 G/DL (ref 31.5–35.7)
MCV RBC AUTO: 89 FL (ref 79–97)
MONOCYTES # BLD AUTO: 0.8 X10E3/UL (ref 0.1–0.9)
MONOCYTES NFR BLD AUTO: 10 %
NEUTROPHILS # BLD AUTO: 3.6 X10E3/UL (ref 1.4–7)
NEUTROPHILS NFR BLD AUTO: 48 %
PLATELET # BLD AUTO: 126 X10E3/UL (ref 150–450)
POTASSIUM SERPL-SCNC: 4.5 MMOL/L (ref 3.5–5.2)
PROT SERPL-MCNC: 6.1 G/DL (ref 6–8.5)
RBC # BLD AUTO: 5.09 X10E6/UL (ref 4.14–5.8)
SODIUM SERPL-SCNC: 141 MMOL/L (ref 134–144)
TTG IGA SER-ACNC: <2 U/ML (ref 0–3)
TTG IGG SER-ACNC: <2 U/ML (ref 0–5)
WBC # BLD AUTO: 7.5 X10E3/UL (ref 3.4–10.8)

## 2022-05-31 NOTE — PROGRESS NOTES
Please inform the patient that lab work shows normal liver function test.  His kidney function is more elevated than previous discuss further with Dr. Ruiz.  Drink plenty of water.  No evidence of anemia on labs.  No evidence of celiac disease.  Await endoscopic findings.

## 2022-06-10 RX ORDER — APIXABAN 5 MG/1
TABLET, FILM COATED ORAL
Qty: 90 TABLET | Refills: 7 | Status: SHIPPED | OUTPATIENT
Start: 2022-06-10 | End: 2023-01-06 | Stop reason: SDUPTHER

## 2022-06-13 ENCOUNTER — TRANSCRIBE ORDERS (OUTPATIENT)
Dept: ADMINISTRATIVE | Facility: HOSPITAL | Age: 77
End: 2022-06-13

## 2022-06-13 DIAGNOSIS — Z01.818 PRE-OP TESTING: Primary | ICD-10-CM

## 2022-06-17 ENCOUNTER — TELEPHONE (OUTPATIENT)
Dept: GASTROENTEROLOGY | Facility: CLINIC | Age: 77
End: 2022-06-17

## 2022-06-17 DIAGNOSIS — Z01.818 PRE-OP TESTING: Primary | ICD-10-CM

## 2022-06-17 NOTE — TELEPHONE ENCOUNTER
RONN patient in person for EGD. Rescheduled to 09/29/2022 with arrival time 7:30am. Prep packet handed to patient in office. Patient advised arrival time may vary based on [facility] guidelines. RONN Davis

## 2022-06-22 ENCOUNTER — OFFICE VISIT (OUTPATIENT)
Dept: ONCOLOGY | Facility: CLINIC | Age: 77
End: 2022-06-22

## 2022-06-22 ENCOUNTER — LAB (OUTPATIENT)
Dept: LAB | Facility: HOSPITAL | Age: 77
End: 2022-06-22

## 2022-06-22 VITALS
WEIGHT: 194 LBS | HEART RATE: 51 BPM | DIASTOLIC BLOOD PRESSURE: 74 MMHG | HEIGHT: 71 IN | SYSTOLIC BLOOD PRESSURE: 138 MMHG | TEMPERATURE: 98.2 F | BODY MASS INDEX: 27.16 KG/M2 | OXYGEN SATURATION: 97 % | RESPIRATION RATE: 16 BRPM

## 2022-06-22 DIAGNOSIS — D69.6 THROMBOCYTOPENIA: ICD-10-CM

## 2022-06-22 DIAGNOSIS — D68.59 HYPERCOAGULABLE STATE: ICD-10-CM

## 2022-06-22 DIAGNOSIS — R13.14 PHARYNGOESOPHAGEAL DYSPHAGIA: ICD-10-CM

## 2022-06-22 DIAGNOSIS — I81 PORTAL VEIN THROMBOSIS: ICD-10-CM

## 2022-06-22 DIAGNOSIS — I81 PORTAL VEIN THROMBOSIS: Primary | ICD-10-CM

## 2022-06-22 DIAGNOSIS — D68.51 FACTOR 5 LEIDEN MUTATION, HETEROZYGOUS: ICD-10-CM

## 2022-06-22 LAB
BASOPHILS # BLD AUTO: 0.07 10*3/MM3 (ref 0–0.2)
BASOPHILS NFR BLD AUTO: 0.9 % (ref 0–1.5)
DEPRECATED RDW RBC AUTO: 41 FL (ref 37–54)
EOSINOPHIL # BLD AUTO: 0.89 10*3/MM3 (ref 0–0.4)
EOSINOPHIL NFR BLD AUTO: 11.5 % (ref 0.3–6.2)
ERYTHROCYTE [DISTWIDTH] IN BLOOD BY AUTOMATED COUNT: 12.7 % (ref 12.3–15.4)
HCT VFR BLD AUTO: 43 % (ref 37.5–51)
HGB BLD-MCNC: 15.1 G/DL (ref 13–17.7)
IMM GRANULOCYTES # BLD AUTO: 0.04 10*3/MM3 (ref 0–0.05)
IMM GRANULOCYTES NFR BLD AUTO: 0.5 % (ref 0–0.5)
LYMPHOCYTES # BLD AUTO: 2.29 10*3/MM3 (ref 0.7–3.1)
LYMPHOCYTES NFR BLD AUTO: 29.7 % (ref 19.6–45.3)
MCH RBC QN AUTO: 31 PG (ref 26.6–33)
MCHC RBC AUTO-ENTMCNC: 35.1 G/DL (ref 31.5–35.7)
MCV RBC AUTO: 88.3 FL (ref 79–97)
MONOCYTES # BLD AUTO: 0.88 10*3/MM3 (ref 0.1–0.9)
MONOCYTES NFR BLD AUTO: 11.4 % (ref 5–12)
NEUTROPHILS NFR BLD AUTO: 3.55 10*3/MM3 (ref 1.7–7)
NEUTROPHILS NFR BLD AUTO: 46 % (ref 42.7–76)
NRBC BLD AUTO-RTO: 0 /100 WBC (ref 0–0.2)
PLATELET # BLD AUTO: 113 10*3/MM3 (ref 140–450)
PLATELETS.RETICULATED NFR BLD AUTO: 2.7 % (ref 0.9–6.5)
PMV BLD AUTO: 10.2 FL (ref 6–12)
RBC # BLD AUTO: 4.87 10*6/MM3 (ref 4.14–5.8)
WBC NRBC COR # BLD: 7.72 10*3/MM3 (ref 3.4–10.8)

## 2022-06-22 PROCEDURE — 36415 COLL VENOUS BLD VENIPUNCTURE: CPT

## 2022-06-22 PROCEDURE — 99214 OFFICE O/P EST MOD 30 MIN: CPT | Performed by: INTERNAL MEDICINE

## 2022-06-22 PROCEDURE — 85055 RETICULATED PLATELET ASSAY: CPT | Performed by: INTERNAL MEDICINE

## 2022-06-22 PROCEDURE — 85025 COMPLETE CBC W/AUTO DIFF WBC: CPT

## 2022-06-22 NOTE — PROGRESS NOTES
Subjective        REASONS FOR FOLLOWUP: pylephlebitis, FACTOR V LEIDEN MUTATION, ANTIGLYCOPROTEIN ANTIBODY POSITIVE     HISTORY OF PRESENT ILLNESS:    DURING THE VISIT WITH THE PATIENT TODAY , PATIENT HAD FACE MASK, I HAD PROPER PROTECTIVE EQUIPMENT, AND I DID HAND HYGIENE WITH SOAP AND WATER BEFORE AND AFTER THE VISIT.  On 06/22/2022, the patient has requested an appointment because he has noticed for the last 6 weeks or so progressive difficulty swallowing. It seems that he chews his food with no difficulties. The food is dispatched into the oropharynx and then in the upper esophagus things get stuck and do not go through. This happens with liquids and solids. Eventually things get loose and he feels the slow movement of food down all the way to the esophagus. He has not had any regurgitation, heartburn, nausea, vomiting, hematemesis or melena. He has not lost any weight. He has not had any sensation of choking so far. No obvious aspiration. He remains on his anticoagulant given his previous history of pylephlebitis, heterozygosity for factor V Leiden mutation, and glycoprotein antibodies positivity. He has not had any clinical bleeding or symptoms of his portal vein thrombosis. His weight is stable. Bowel function and urination are normal. No cardiovascular or respiratory problems. He is very worried about his wife who has been diagnosed recently at [facility] with dementia by neurologist.        Past Medical History:   Diagnosis Date   • Abdominal pain    • Arteriosclerotic cardiovascular disease    • Diverticulosis    • Dysphagia    • GERD (gastroesophageal reflux disease)    • H/O Interstitial cystitis    • Hyperlipidemia    • Hypertension    • Internal hemorrhoids    • Prostate enlargement    • Renal cyst, right    • Umbilical hernia         Past Surgical History:   Procedure Laterality Date   • ACHILLES TENDON REPAIR      ruptured tendon   • COLONOSCOPY N/A 2012    Dr Alvarado/[facility]   • SHOULDER  SURGERY     • TONSILLECTOMY          Current Outpatient Medications on File Prior to Visit   Medication Sig Dispense Refill   • amLODIPine-benazepril (LOTREL 5-20) 5-20 MG per capsule TAKE 1 CAPSULE DAILY 90 capsule 3   • Coenzyme Q10 150 MG capsule Take 1 tablet by mouth Every Night.     • Eliquis 5 MG tablet tablet TAKE 1 TABLET EVERY 12 HOURS 90 tablet 7   • Melatonin 3 MG capsule Take 1 capsule by mouth Every Night.     • metoprolol succinate XL (TOPROL-XL) 50 MG 24 hr tablet TAKE 1 TABLET DAILY 90 tablet 3   • pantoprazole (PROTONIX) 40 MG EC tablet Take 1 tablet by mouth Daily. 90 tablet 3   • pravastatin (PRAVACHOL) 80 MG tablet TAKE 1 TABLET EVERY NIGHT 90 tablet 3   • tamsulosin (FLOMAX) 0.4 MG capsule 24 hr capsule TAKE 1 CAPSULE DAILY 90 capsule 3     No current facility-administered medications on file prior to visit.        ALLERGIES:    Allergies   Allergen Reactions   • Sulfamethoxazole-Trimethoprim Itching     Abdominal pain as well         Social History     Socioeconomic History   • Marital status:      Spouse name: Amarilys   Tobacco Use   • Smoking status: Former Smoker   • Smokeless tobacco: Never Used   Substance and Sexual Activity   • Alcohol use: No   • Drug use: No   • Sexual activity: Defer        Family History   Problem Relation Age of Onset   • Heart attack Father    • Heart disease Father    • Hypertension Mother         HEMATOLOGY HISTORY:  75-year-old white male who has had abdominal pain for almost 1 month in the right upper quadrant and has been progressively worse and becoming almost epigastric, especially in the morning with an intensity of 7 out of 10. He has occasional nausea but no vomiting. He has sensation of distended abdomen. He has not had excessive burping or belching and neither flatulence. He has been having normal bowel activity with no passage of blood in the stool. No diarrhea. He has not developed any jaundice. Urination has remained normal. He has had  minimal swelling in his lower extremities and he states his feet remain cold all the time. Given the persistency of his symptoms and given the fact that he was seen at an urgent care center at [facility] and advised to be seen by Gastroenterology at [place] and given an appointment for 6 weeks, he decided to come to the emergency room. At the time of the emergency room visit, a CT scan of the abdomen was done that documented extensive thrombosis of the portal vein. The extension of the thrombosis was into the splenic vein into the superior mesenteric vein. The patient was placed immediately on heparin. Since then the symptoms have improved some. He has no nausea or vomiting this morning. His abdomen still remains kind of distended but he has had good bowel activity this morning with no passage of blood in the stool. He has not had any fever, chills or sweats. He denies any cough, sputum production or shortness of breath. He has no bone pain. No joint pain. No rash in the skin. No alopecia. No sores in his mouth and no lesions in the skin otherwise like livedo reticularis. The patient denies any neurological symptoms. He denies any changes in performance status. His weight has remained stable. He is very much indispensable at home. His wife has multiple comorbidities and he is the only one that is able to handle her.    THROMBOPHILIA  CLINICAL AND LABORATORY REPORT:  DATE: 4/ /16 /21     DIAGNOSIS: PYLEPHLEBITIS 3/2021      KEY: P EQUAL TO POSITIVE  , N  EQUAL TO NEGATIVE.    PREPARED BY SENTHIL KELLEY MD STAYS IN THE CHART PERMANENTLY      CLINICAL FACTORS:     OBESITY: ___Y___     DIABETES: __N____.    IMMOBILITY: __N____.    SMOKING HISTORY : ____N________ PACK A DAY/ YEARS ________    LONG TRIPS BY CAR OR AIRPLANE: ___N_____    RECENT SURGERY: ___N_______   LOCATION: ________    TRAUMA: ____N____    MEDICATIONS:   BIRTH CONTROL MEDICATIONS ____N____ ANDROGENS _N______ ESTROGENS __N______ HEPARIN N________  OTHERS ____N___    PREGNANCY:   ____N____.    FAMILY HISTORY OF BLOOD CLOTHS:    POSITIVE:   ________ NEGATIVE __N_____    HEMATOLOGIC DISORDER:  POLYCYTHEMIA VERA ___N____ THROMBOCYTOSIS _N_____    DIC:___N_____  TTP ___N_______    COLLAGEN VASCULAR DISEASE: __N___   DIAGNOSIS: ________________________.    VASCULITIS: TYPE AND LOCATION ____N___________ BIOPSY PROVEN:____________    CANCER DIAGNOSIS: ___N____    TYPE ________    CANCER SCREENING:  BREAST _______ COLON ___Y_____ LUNG ________ GENITOURINARY ___Y______   CT SCANS _____Y______ NORMAL ______ ABNORMAL ________    INDWELLING VASCULAR DEVICE:  ____N_____. LOCATION _________    INFLAMMATORY BOWEL DISEASE: ULCERATIVE COLITIS ______N____   CHRON'S DISEASE _____N______    RECENT COVID 19 INFECTION ______N_________.          LABORATORY:    FACTOR V LEIDEN MUTATION: POSITIVE   _Y____ NEGATIVE _____ HETEROZYGOUS __Y______ HOMOZYGOUS ________    PROTHROMBIN GENE MUTATION:  POSITIVE _____ NEGATIVE _N____    ANTITHROMBIN III : NORMAL ___N__   ABNORMAL ______ QUANTIFICATION: _______    PROTEIN S ANTIGEN _____N_____ PROTEIN S FUNCTIONAL ___N_____ PROTEIN C _N________     LUPUS ANTICOAGULANT: POSITIVE _____  NEGATIVE _N____. REPEATED  6 WEEKS LATER ____YES POSITIVE__    ANTICARDIOLIPIN ANTIBODY PROFILE : POSITIVE ______  NEGATIVE _N_____  IGG _____ IGM _____ REPEATED 6 WEEKS LATER _________    ANTI GLYCOPROTEIN ANTIBODY:  POSITIVE __Y___  NEGATIVE  _______   IGG _____ IGM _Y/19____ REPEATED 12 WEEKS LATER __Y IT REMAINS POSITIVE______    ANTIPHOSPATIDIL SERINE ANTIBODY:  POSITIVE _____ NEGATIVE _______    SERUM PROTEIN ELECTROPHORESIS AND IMMUNOFIXATION: NO MONOCLONAL PROTEIN __N______ POSITIVE MONOCLONAL PROTEIN ______ TYPE _________    C REACTIVE PROTEIN HIGH SENSIBILITY: NORMAL __Y_____ ABNORMAL :QUANTIFICATION ________    SEDIMENTATION RATE: _____ MM/H.    FIBRINOGEN LEVEL: _________ NORMAL _______  ABNORMAL _Y/465_________.    LIPID PROFILE:    CHOLESTEROL HIGH  "______Y____  TRYGLYCERIDES HIGH  __Y______    HEPARIN ASSOCIATED ANTIPLATELET ANTIBODY: _NA________        Objective     Vitals:    06/22/22 1507   BP: 138/74   Pulse: 51   Resp: 16   Temp: 98.2 °F (36.8 °C)   TempSrc: Temporal   SpO2: 97%   Weight: 88 kg (194 lb)   Height: 180.3 cm (70.98\")   PainSc: 0-No pain     Current Status 6/22/2022   ECOG score 0     Exam:        GENERAL:  Well-developed, well-nourished  Patient  in no acute distress.   SKIN:  Warm, dry ,NO purpura ,no rash.  HEENT:  Pupils were equal and reactive to light and accomodation, conjunctivae noninjected, normal extraocular movements, normal visual acuity.   NECK:  Supple with good range of motion; no thyromegaly , no JVD or bruits,.No carotid artery pain, no carotid abnormal pulsation   LYMPHATICS:  No cervical, NO supraclavicular, NO axillary, NO inguinal adenopathies.  CARDIAC   normal rate , regular rhythm, without murmur,NO rubs NO S3 NO S4   LUNGS: normal breath sounds bilateral, no wheezing, NO rhonchi, NO crackles ,NO rubs.  VASCULAR VENOUS: no cyanosis, NO collateral circulation, NO varicosities, NO edema, NO palpable cords, NO pain,NO erythema, NO pigmentation of the skin.  ABDOMEN:  Soft, NO pain,no hepatomegaly, no splenomegaly,no masses, no ascites, no collateral circulation,no distention.  EXTREMITIES  AND SPINE:  No clubbing, no cyanosis ,no deformities , no pain .No kyphosis,  no pain in spine, no pain in ribs , no pain in pelvic bone.  NEUROLOGICAL:  Patient was awake, alert, oriented to time, person and place.          RECENT LABS:  Hematology WBC   Date Value Ref Range Status   06/22/2022 7.72 3.40 - 10.80 10*3/mm3 Final   05/26/2022 7.5 3.4 - 10.8 x10E3/uL Final     RBC   Date Value Ref Range Status   06/22/2022 4.87 4.14 - 5.80 10*6/mm3 Final   05/26/2022 5.09 4.14 - 5.80 x10E6/uL Final     Hemoglobin   Date Value Ref Range Status   06/22/2022 15.1 13.0 - 17.7 g/dL Final     Hematocrit   Date Value Ref Range Status "   06/22/2022 43.0 37.5 - 51.0 % Final     Platelets   Date Value Ref Range Status   06/22/2022 113 (L) 140 - 450 10*3/mm3 Final       CBC:    WBC   Date Value Ref Range Status   06/22/2022 7.72 3.40 - 10.80 10*3/mm3 Final   05/26/2022 7.5 3.4 - 10.8 x10E3/uL Final     RBC   Date Value Ref Range Status   06/22/2022 4.87 4.14 - 5.80 10*6/mm3 Final   05/26/2022 5.09 4.14 - 5.80 x10E6/uL Final     Hemoglobin   Date Value Ref Range Status   06/22/2022 15.1 13.0 - 17.7 g/dL Final     Hematocrit   Date Value Ref Range Status   06/22/2022 43.0 37.5 - 51.0 % Final     MCV   Date Value Ref Range Status   06/22/2022 88.3 79.0 - 97.0 fL Final     MCH   Date Value Ref Range Status   06/22/2022 31.0 26.6 - 33.0 pg Final     MCHC   Date Value Ref Range Status   06/22/2022 35.1 31.5 - 35.7 g/dL Final     RDW   Date Value Ref Range Status   06/22/2022 12.7 12.3 - 15.4 % Final     RDW-SD   Date Value Ref Range Status   06/22/2022 41.0 37.0 - 54.0 fl Final     MPV   Date Value Ref Range Status   06/22/2022 10.2 6.0 - 12.0 fL Final     Platelets   Date Value Ref Range Status   06/22/2022 113 (L) 140 - 450 10*3/mm3 Final     Neutrophil %   Date Value Ref Range Status   06/22/2022 46.0 42.7 - 76.0 % Final     Lymphocyte %   Date Value Ref Range Status   06/22/2022 29.7 19.6 - 45.3 % Final     Monocyte %   Date Value Ref Range Status   06/22/2022 11.4 5.0 - 12.0 % Final     Eosinophil %   Date Value Ref Range Status   06/22/2022 11.5 (H) 0.3 - 6.2 % Final     Basophil %   Date Value Ref Range Status   06/22/2022 0.9 0.0 - 1.5 % Final     Immature Grans %   Date Value Ref Range Status   06/22/2022 0.5 0.0 - 0.5 % Final     Neutrophils, Absolute   Date Value Ref Range Status   06/22/2022 3.55 1.70 - 7.00 10*3/mm3 Final     Lymphocytes, Absolute   Date Value Ref Range Status   06/22/2022 2.29 0.70 - 3.10 10*3/mm3 Final     Monocytes, Absolute   Date Value Ref Range Status   06/22/2022 0.88 0.10 - 0.90 10*3/mm3 Final     Eosinophils, Absolute    Date Value Ref Range Status   06/22/2022 0.89 (H) 0.00 - 0.40 10*3/mm3 Final     Basophils, Absolute   Date Value Ref Range Status   06/22/2022 0.07 0.00 - 0.20 10*3/mm3 Final     Immature Grans, Absolute   Date Value Ref Range Status   06/22/2022 0.04 0.00 - 0.05 10*3/mm3 Final     nRBC   Date Value Ref Range Status   06/22/2022 0.0 0.0 - 0.2 /100 WBC Final        CMP:    Glucose   Date Value Ref Range Status   05/26/2022 114 (H) 65 - 99 mg/dL Final   09/28/2021 92 74 - 124 mg/dL Final     BUN   Date Value Ref Range Status   05/26/2022 20 8 - 27 mg/dL Final   09/28/2021 19 6 - 20 mg/dL Final     Creatinine   Date Value Ref Range Status   05/26/2022 1.52 (H) 0.76 - 1.27 mg/dL Final   10/06/2021 1.30 0.60 - 1.30 mg/dL Final     Comment:     Serial Number: [id number]  [id number]     Sodium   Date Value Ref Range Status   05/26/2022 141 134 - 144 mmol/L Final   09/28/2021 138 134 - 145 mmol/L Final     Potassium   Date Value Ref Range Status   05/26/2022 4.5 3.5 - 5.2 mmol/L Final   09/28/2021 4.6 3.5 - 4.7 mmol/L Final     Chloride   Date Value Ref Range Status   05/26/2022 106 96 - 106 mmol/L Final   09/28/2021 106 98 - 107 mmol/L Final     CO2   Date Value Ref Range Status   09/28/2021 18.8 (L) 22.0 - 29.0 mmol/L Final     Total CO2   Date Value Ref Range Status   05/26/2022 21 20 - 29 mmol/L Final     Calcium   Date Value Ref Range Status   05/26/2022 9.5 8.6 - 10.2 mg/dL Final   09/28/2021 10.1 8.5 - 10.2 mg/dL Final     Total Protein   Date Value Ref Range Status   09/28/2021 6.6 6.3 - 8.0 g/dL Final     Albumin   Date Value Ref Range Status   05/26/2022 4.1 3.7 - 4.7 g/dL Final   09/28/2021 4.10 3.50 - 5.20 g/dL Final     ALT (SGPT)   Date Value Ref Range Status   05/26/2022 14 0 - 44 IU/L Final   09/28/2021 15 0 - 41 U/L Final     AST (SGOT)   Date Value Ref Range Status   05/26/2022 15 0 - 40 IU/L Final   09/28/2021 21 0 - 40 U/L Final     Alkaline Phosphatase   Date Value Ref Range Status   05/26/2022 82  44 - 121 IU/L Final   09/28/2021 87 38 - 116 U/L Final     Total Bilirubin   Date Value Ref Range Status   05/26/2022 0.5 0.0 - 1.2 mg/dL Final   09/28/2021 0.8 0.2 - 1.2 mg/dL Final     eGFR  Am   Date Value Ref Range Status   11/22/2021 59 (L) >59 mL/min/1.73 Final     Comment:     **In accordance with recommendations from the NKF-ASN Task force,**    [facility] is in the process of updating its eGFR calculation to the    2021 CKD-EPI creatinine equation that estimates kidney function    without a race variable.       Globulin   Date Value Ref Range Status   09/28/2021 2.5 1.8 - 3.5 gm/dL Final     A/G Ratio   Date Value Ref Range Status   09/28/2021 1.6 1.1 - 2.4 g/dL Final     BUN/Creatinine Ratio   Date Value Ref Range Status   05/26/2022 13 10 - 24 Final   09/28/2021 15.0 7.3 - 30.0 Final     Anion Gap   Date Value Ref Range Status   09/28/2021 13.2 5.0 - 15.0 mmol/L Final                    Assessment & Plan     Diagnoses and all orders for this visit:    1. Portal vein thrombosis (Primary)  -     Immature Platelet Fraction  -     CBC & Differential; Future  -     Comprehensive Metabolic Panel; Future  -     FL Video Swallow With Speech Single Contrast; Future  -     FL Esophagram Complete; Future    2. Hypercoagulable state (HCC)  -     Immature Platelet Fraction  -     CBC & Differential; Future  -     Comprehensive Metabolic Panel; Future  -     FL Video Swallow With Speech Single Contrast; Future  -     FL Esophagram Complete; Future    3. Thrombocytopenia (HCC)  -     Immature Platelet Fraction  -     CBC & Differential; Future  -     Comprehensive Metabolic Panel; Future  -     FL Video Swallow With Speech Single Contrast; Future  -     FL Esophagram Complete; Future    4. Factor 5 Leiden mutation, heterozygous (HCC)  -     Immature Platelet Fraction  -     CBC & Differential; Future  -     Comprehensive Metabolic Panel; Future  -     FL Video Swallow With Speech Single Contrast; Future  -     FL  Esophagram Complete; Future    5. Pharyngoesophageal dysphagia  -     CBC & Differential; Future  -     Comprehensive Metabolic Panel; Future  -     FL Video Swallow With Speech Single Contrast; Future  -     FL Esophagram Complete; Future      1.   Portal vein thrombosis diagnosed March 2021:  · Present to ER with abdominal pain, diagnosed pylephlebitis and he was placed on heparin and subsequently Eliquis and discharged home. Since discharge his abdominal pain has almost substantially improved to the point of resolution and he has no abdominal distention.   · heterozygous for factor V Leiden mutation and he has positivity for anti-glycoprotein antibodies. The rest of the thrombophilia workup stated above is negative.   · eviewed on 06/07/2021. He has not had any new manifestations of his portal vein thrombosis in fact no symptoms at all. A CT scan that I have personally reviewed has documented cavernous transformation of the portal vein and recanalization as well as multiple collateral circulation formation. That means that the flow and the venous return from the growth into the liver is appropriately directed. The patient has not had any new thrombosis. His antiglycoprotein antibody remains positive 44 in comparison with previous assessment. He has also a lupus anticoagulant positive.   · I pointed out to the patient that given the circumstances and his factor V Leiden mutation heterozygosity he is to remain on anticoagulation for the rest of his life.   The patient was reviewed on 12/01/2021. He has not had any further GI symptomatology. The Prilosec has made a big difference in regard to his issues. He has no abdominal distention. His physical exam disclosed no abdominal wall collateral circulation but radiologically his CT scan posted above documents abundant collaterals surrounding the portal system. There are no alterations in the pancreas, gallbladder, liver, spleen, kidney anatomies. Aorta with minimal  atherosclerosis, no retroperitoneal adenopathy, no ascites.     I advised him to remain on the Eliquis obviously staying away from trauma.   On 06/22/2022, the patient was reviewed in regard to his history of thrombophilia. He remains on Eliquis. No clinical bleeding. No new thrombosis. No abdominal symptomatology, no modification in the size of his abdomen, no liver enlargement, no obvious ascites, no obvious collateral circulation in the abdominal wall. He was advised again about prevention of trauma. He will return to see us in 6 months with a CBC and a CMP.      ·   2.  Abdominal pain:   Seen 9/28/2021 for triage visit due to recent intermittent abdominal pain, epigastric and radiating to the back.  He denies any other associated pain or symptoms.  He does not feel this is reflux he does not have the classic burning up the esophagus.  He however occasionally has a raspy cough.  He has not tried any over-the-counter Prilosec or Pepcid recently.  He states in the past he did have issues with this but not recently.  He denies nausea, edema, shortness of breath, or pain with deep breath.  He states he is not missed any of his Eliquis dosing.  This is reviewed with Dr. Magdaleno today and we will proceed with a CT abdomen pelvis for evaluation due to his portal vein thrombosis.  We will also start the patient on Prilosec 20 mg daily for 1 month to see if this helps his symptoms.  CMP was performed today which was unremarkable.   · Patient was seen via telehealth visit on 10/12/2021 to review CT scans.  Prior to the visit I had reviewed and discussed his CT scans with Dr. Magdaleno who reviewed the images and felt that his CT scan was stable.  Discussed with the patient he is to remain on anticoagulation with Eliquis 5 mg every 12 hours for the remainder of his life.  We did discuss trying to space the dosing about every 12 hours.  We discussed trying Pepcid complete to see if this would help relieve his reflux symptoms as  needed.  Call with worsening abdominal pain or other concerns.  We discussed eating smaller more frequent meals.  The Prilosec has made a big difference in regard to his abdominal symptomatology and he will remain on the present dose 20 mg a day for the rest of the year. He will have a new prescription sent to [facility] in this regard as well. Otherwise no other new issues for him. I advised him to stay away from trauma and minimize any potentiality for falls during winter months, frozen steps, ice and snow.  On 06/22/2022, he has no abdominal pain since proton pump inhibitor was initiated. The patient was advised to remain on this medicine. He no longer had any abdominal pain or discomfort.    3.Regarding thrombocytopenia, the patient’s platelet count is around 110,000. No palpable splenomegaly. Radiologically the patient has no obvious splenomegaly but the thrombosis of the portal vein makes suggestion that he could have some sequestration of platelets by the spleen. He has not been taking any medicine that would trigger thrombocytopenia and he has no other comorbidity that would lead into thrombocytopenia. An IPF is pending today. We will watch this in absence of any other intervention.    4.On 06/22/2022, the patient was reviewed because he called the office about dysphagia. In the way how his symptoms are for the last 6 weeks or so, he has oropharyngeal dysphagia and raises the question about cricopharyngeal muscle issues. His oropharynx is normal on the clinical examination today. His neck is normal is the clinical examination today. He is not having any episodes of aspiration.     I advised him to use 2 Altoids before he swallows any food at least 10 minutes before to see if that helps out. Recent data supports this. Also I advised him to do barium swallowing with speech pathologist. He was going to have an upper endoscopy by Dr. Alvarado. This was postponed until this 09/2022 and he does not want to wait  for so long until this is rescheduled. His wife has been diagnosed with dementia and that is why he had to postpone the esophagoscopic procedure.     We will go ahead and schedule the barium swallowing esophagram and we will call him with the report of this. We will share this information with Dr. Alvarado. Otherwise, he had no other issues.     I summarized each one of the points as stated above and that is the plan of care.          Danie Magdaleno MD

## 2022-07-11 ENCOUNTER — HOSPITAL ENCOUNTER (OUTPATIENT)
Dept: GENERAL RADIOLOGY | Facility: HOSPITAL | Age: 77
Discharge: HOME | End: 2022-07-11

## 2022-07-11 DIAGNOSIS — D68.59 HYPERCOAGULABLE STATE: ICD-10-CM

## 2022-07-11 DIAGNOSIS — D68.51 FACTOR 5 LEIDEN MUTATION, HETEROZYGOUS: ICD-10-CM

## 2022-07-11 DIAGNOSIS — D69.6 THROMBOCYTOPENIA: ICD-10-CM

## 2022-07-11 DIAGNOSIS — R13.14 PHARYNGOESOPHAGEAL DYSPHAGIA: ICD-10-CM

## 2022-07-11 DIAGNOSIS — I81 PORTAL VEIN THROMBOSIS: ICD-10-CM

## 2022-07-11 PROCEDURE — 63710000001 BARIUM SULFATE 98 % RECONSTITUTED SUSPENSION: Performed by: INTERNAL MEDICINE

## 2022-07-11 PROCEDURE — A9270 NON-COVERED ITEM OR SERVICE: HCPCS | Performed by: INTERNAL MEDICINE

## 2022-07-11 PROCEDURE — 63710000001 SOD BICARB-CITRIC ACID-SIMETHICONE 2.21-1.53-0.04 G PACK: Performed by: INTERNAL MEDICINE

## 2022-07-11 PROCEDURE — 63710000001 BARIUM SULFATE 60 % CREAM: Performed by: INTERNAL MEDICINE

## 2022-07-11 PROCEDURE — 63710000001 BARIUM SULFATE 96 % RECONSTITUTED SUSPENSION: Performed by: INTERNAL MEDICINE

## 2022-07-11 PROCEDURE — 92611 MOTION FLUOROSCOPY/SWALLOW: CPT | Performed by: SPEECH-LANGUAGE PATHOLOGIST

## 2022-07-11 PROCEDURE — 63710000001 BARIUM SULFATE 700 MG TABLET: Performed by: INTERNAL MEDICINE

## 2022-07-11 PROCEDURE — 74221 X-RAY XM ESOPHAGUS 2CNTRST: CPT

## 2022-07-11 PROCEDURE — 74230 X-RAY XM SWLNG FUNCJ C+: CPT

## 2022-07-11 PROCEDURE — 63710000001 BARIUM SULFATE 40 % RECONSTITUTED SUSPENSION: Performed by: INTERNAL MEDICINE

## 2022-07-11 PROCEDURE — 63710000001 BARIUM SULFATE 40 % SUSPENSION: Performed by: INTERNAL MEDICINE

## 2022-07-11 RX ADMIN — BARIUM SULFATE 4 ML: 980 POWDER, FOR SUSPENSION ORAL at 10:41

## 2022-07-11 RX ADMIN — BARIUM SULFATE 1 TEASPOON(S): 0.6 CREAM ORAL at 10:41

## 2022-07-11 RX ADMIN — BARIUM SULFATE 135 ML: 980 POWDER, FOR SUSPENSION ORAL at 10:15

## 2022-07-11 RX ADMIN — BARIUM SULFATE 50 ML: 400 SUSPENSION ORAL at 10:41

## 2022-07-11 RX ADMIN — BARIUM SULFATE 700 MG: 700 TABLET ORAL at 10:15

## 2022-07-11 RX ADMIN — BARIUM SULFATE 183 ML: 960 POWDER, FOR SUSPENSION ORAL at 10:15

## 2022-07-11 RX ADMIN — BARIUM SULFATE 55 ML: 0.81 POWDER, FOR SUSPENSION ORAL at 10:41

## 2022-07-11 RX ADMIN — ANTACID/ANTIFLATULENT 1 PACKET: 380; 550; 10; 10 GRANULE, EFFERVESCENT ORAL at 10:15

## 2022-07-11 NOTE — MBS/VFSS/FEES
Outpatient Speech Language Pathology   Adult Swallow Initial Evaluation-VFSS    [facility]     Patient Name: Timi Wolf  : 1945  MRN: [id number]  Today's Date: 2022         Visit Date: 2022     SPEECH-LANGUAGE PATHOLOGY EVALUTION - VFSS  Subjective: The patient was seen on this date for a VFSS(Videofluoroscopic Swallowing Study).  Patient was alert and cooperative.    Objective: Risks/benefits were reviewed with the patient, and consent was obtained. The study was completed with SLP and Radiologist present. The patient was seen in lateral view(s). Textures given included barium tablet w/ water, thin liquid, puree consistency, mechanical soft consistency and regular consistency.  Assessment: VFSS completed in conjunction with Dr Solano.  Pt demonstrates no penetration or aspiration with any consistency trialed.  Premature spillage noted only with mixed consistencies.  Mild pharyngeal residue with solids which cleared with double swallow or liquid wash.  Residue observed at cricopharyngeus, back of epiglottis and in valleculae prior to clearing.  Pt complained of sensation of sticking in throat following completion of study.  Dry swallow with a right head turn was reported to reduce sensation. Barium tablet with water passed easily into esophagus without hesitation. Please see esophagram completed this dater for esophageal details.   SLP Findings: Patient presents with functional swallow.   Recommendations: Diet Textures: thin liquid, regular consistency food. Medications should be taken as tolerated   Recommended Strategies: Upright for PO and small bites and sips. Slow rate to improve mastication.  Dry swallow with head turn if sensation of sticking noted. Oral care before breakfast, after all meals and PRN.  Dysphagia therapy is not recommended. Rationale: functional oropharyngeal swallow.        Patient Active Problem List   Diagnosis   • Chronic coronary artery disease   •  Hyperlipidemia   • Hypertension   • Interstitial cystitis (chronic) without hematuria   • Prostate enlargement   • Epigastric pain   • Portal vein thrombosis   • Hypercoagulable state (HCC)   • CKD (chronic kidney disease) 3a   • Thrombocytopenia (HCC)   • Dysphagia   • Heartburn   • Factor 5 Leiden mutation, heterozygous (HCC)        Past Medical History:   Diagnosis Date   • Abdominal pain    • Arteriosclerotic cardiovascular disease    • Diverticulosis    • Dysphagia    • GERD (gastroesophageal reflux disease)    • H/O Interstitial cystitis    • Hyperlipidemia    • Hypertension    • Internal hemorrhoids    • Prostate enlargement    • Renal cyst, right    • Umbilical hernia         Past Surgical History:   Procedure Laterality Date   • ACHILLES TENDON REPAIR      ruptured tendon   • COLONOSCOPY N/A 2012    Dr Alvarado/[facility]   • SHOULDER SURGERY     • TONSILLECTOMY           Visit Dx:     ICD-10-CM ICD-9-CM   1. Pharyngoesophageal dysphagia  R13.14 787.24   2. Portal vein thrombosis  I81 452   3. Hypercoagulable state (HCC)  D68.59 289.81   4. Thrombocytopenia (HCC)  D69.6 287.5   5. Factor 5 Leiden mutation, heterozygous (HCC)  D68.51 289.81            OP SLP Assessment/Plan - 07/11/22 1200        SLP Assessment    Functional Problems Swallowing  -SA    Impact on Function: Swallowing Risk of aspiration;Risk of pneumonia  -SA    Clinical Impression: Swallowing WNL  -SA    Patient/caregiver participated in establishment of treatment plan and goals Yes  -SA    Patient would benefit from skilled therapy intervention No  -SA       SLP Plan    Frequency eval only  -SA          User Key  (r) = Recorded By, (t) = Taken By, (c) = Cosigned By    Initials Name Provider Type    SA Viri Guidry MS CCC-SLP Speech and Language Pathologist                 SLP Adult Swallow Evaluation     Row Name 07/11/22 1015       Rehab Evaluation    Document Type evaluation;other (see comments)  VFSS  -SA    Subjective Information complains of   pills sticking in throat and 2nd swallow of coffee not going down  -SA    Patient Observations alert;cooperative;agree to therapy  -SA    Patient Effort good  -SA    Symptoms Noted During/After Treatment none  -SA       General Information    Patient Profile Reviewed yes  -SA    Current Method of Nutrition regular textures;thin liquids  -SA    Precautions/Limitations, Vision WFL with corrective lenses  -SA    Precautions/Limitations, Hearing WFL;for purposes of eval  -SA    Prior Level of Function-Communication WFL  -SA    Prior Level of Function-Swallowing no diet consistency restrictions  -SA    Plans/Goals Discussed with patient;agreed upon  -SA    Barriers to Rehab none identified  -SA       Pain    Additional Documentation Pain Scale: Numbers Pre/Post-Treatment (Group)  -SA       Pain Scale: Numbers Pre/Post-Treatment    Pretreatment Pain Rating 0/10 - no pain  -SA    Posttreatment Pain Rating 0/10 - no pain  -SA       MBS/VFSS    Utensils Used spoon;cup;straw  -SA    Consistencies Trialed soft textures;regular textures;mixed consistency;pureed;thin liquids;barium pill  -SA       SLP Communication to Radiology    Summary Statement VFSS completed in conjunction with Dr Solano.  Pt demonstrates no penetration or aspiration with any consistency trialed.  Premature spillage noted only with mixed consistencies.  Mild pharyngeal residue with solids which cleared with double swallow or liquid wash.  Barium tablet with water passed easily into esophagus without hesitation.  -SA       SLP Evaluation Clinical Impression    SLP Swallowing Diagnosis functional pharyngeal phase;functional oral phase  -SA    Functional Impact no impact on function  -SA       Recommendations    Therapy Frequency (Swallow) evaluation only  -SA    SLP Diet Recommendation regular textures;thin liquids  -SA    Recommended Precautions and Strategies upright posture during/after eating;small bites of food and sips of liquid  -SA    Oral Care  Recommendations Oral Care BID/PRN  -SA    SLP Rec. for Method of Medication Administration as tolerated  -SA          User Key  (r) = Recorded By, (t) = Taken By, (c) = Cosigned By    Initials Name Provider Type    Viri Davis MS CCC-SLP Speech and Language Pathologist                               OP SLP Education     Row Name 07/11/22 1200       Education    Barriers to Learning No barriers identified  -    Education Provided Described results of evaluation;Patient expressed understanding of evaluation  -    Assessed Learning needs;Learning motivation  -    Learning Motivation Strong  -    Learning Method Explanation  -    Teaching Response Verbalized understanding  -          User Key  (r) = Recorded By, (t) = Taken By, (c) = Cosigned By    Initials Name Effective Dates    Viri Davis MS CCC-SLP 06/01/22 -                          Time Calculation:   SLP Start Time: 1015    Therapy Charges for Today     Code Description Service Date Service Provider Modifiers Qty    [id number] HC ST MOTION FLUORO EVAL SWALLOW 5 7/11/2022 Viri Guidry MS CCC-SLP GN 1                   MS SHARRON AlvaradoSLP  7/11/2022

## 2022-07-13 ENCOUNTER — TELEPHONE (OUTPATIENT)
Dept: ONCOLOGY | Facility: CLINIC | Age: 77
End: 2022-07-13

## 2022-07-13 NOTE — TELEPHONE ENCOUNTER
----- Message from Danie Magdaleno MD sent at 7/13/2022  9:37 AM EDT -----  CALL HIS SWALLOWING MECHANISM IS FINE, OK TO SEE DR MCMILLAN LATER ON, NO EVIDENCE OF ASPIRATION OR TUMOR

## 2022-08-19 ENCOUNTER — TELEPHONE (OUTPATIENT)
Dept: INTERNAL MEDICINE | Facility: CLINIC | Age: 77
End: 2022-08-19

## 2022-08-19 ENCOUNTER — TELEMEDICINE (OUTPATIENT)
Dept: INTERNAL MEDICINE | Facility: CLINIC | Age: 77
End: 2022-08-19

## 2022-08-19 DIAGNOSIS — J06.9 UPPER RESPIRATORY TRACT INFECTION DUE TO COVID-19 VIRUS: Primary | ICD-10-CM

## 2022-08-19 DIAGNOSIS — U07.1 UPPER RESPIRATORY TRACT INFECTION DUE TO COVID-19 VIRUS: Primary | ICD-10-CM

## 2022-08-19 PROCEDURE — 99213 OFFICE O/P EST LOW 20 MIN: CPT | Performed by: FAMILY MEDICINE

## 2022-08-19 NOTE — TELEPHONE ENCOUNTER
PATIENT IS COVID POSITIVE. WIFE IS AT [facility] WITH COVID. . HE TESTED ON Wednesday. SYMPTOMS STARTED Tuesday NIGHT AFTER COMING HOME FROM THE HOSPITAL.  TEMP 100.1-100.5, COUGH, SORE THROAT, SNEEZING.     HE IS REQUESTING MEDICATION TO HELP WITH SYMPTOMS.     [facility] [address] AT [facility] MI[address] FERRER [facility] - [phone] [facility] [phone]   [phone]    CALL BACK NUMBER [phone]

## 2022-08-19 NOTE — ASSESSMENT & PLAN NOTE
Patient agrees to Paxlovid with stipulation of reducing Eliquis to 5 mg daily as opposed to 5 twice daily well on  Paxlovid.  He will also hold pravastatin while taking  Paxlovid.

## 2022-08-19 NOTE — TELEPHONE ENCOUNTER
Provider: DR DOCKERY    Caller: DOMINGUEZ FARLEY    Relationship to Patient: SELF      Phone Number: [phone]    Reason for Call:  RETURNING CALL FROM OFFICE, MESSAGE RELAYED TO PATIENT ABOUT DOXY APPOINTMENT AT 4:00 ON 08/19/22 PER [facility]     PATIENT UNDERSTOOD AND IS WAITING FOR DOXY VISIT     Pt is alert and oriented and has complaints of pain percocet given. Pt up in room and on tele. pt has open wound to back and no drainage noted.

## 2022-08-19 NOTE — TELEPHONE ENCOUNTER
Called and left pt a msg that I have scheduled him for a virtual visit this afternoon to discuss with Dr Joseph DUNLAP FOR [facility] TO [facility]

## 2022-08-19 NOTE — PROGRESS NOTES
"Chief Complaint  URI (Positive COVID-19)    Subjective        Timi Wolf presents to [facility]  Patient has been exposed to his wife who has COVID-19 in the hospital.  He has not developed symptoms over the past 3 days with positive COVID-19 test.  No respiratory distress but does have URI symptoms body aches chills fever.  We discussed treatment and he agrees to  Paxlovid.   Medications are reviewed and left to cut the dose of Eliquis in half from 5 twice daily to 5 mg once daily.  Otherwise hold pravastatin while on Paxlovid.    You have chosen to receive care through a telehealth visit.  Do you consent to use a video/audio connection for your medical care today? Yes      Objective   Vital Signs:  There were no vitals taken for this visit.  Estimated body mass index is 27.07 kg/m² as calculated from the following:    Height as of 6/22/22: 180.3 cm (70.98\").    Weight as of 6/22/22: 88 kg (194 lb).          Physical Exam  HENT:      Head: Normocephalic.   Pulmonary:      Effort: Pulmonary effort is normal.   Neurological:      General: No focal deficit present.      Mental Status: He is alert.   Psychiatric:         Mood and Affect: Mood normal.        Result Review :                Assessment and Plan   Diagnoses and all orders for this visit:    1. Upper respiratory tract infection due to COVID-19 virus (Primary)    Other orders  -     Nirmatrelvir & Ritonavir (PAXLOVID) 20 x 150 MG & 10 x 100MG tablet therapy pack tablet; Take 3 tablets by mouth 2 (Two) Times a Day for 5 days.  Dispense: 30 tablet; Refill: 0             Follow Up   No follow-ups on file.  Patient was given instructions and counseling regarding his condition or for health maintenance advice. Please see specific information pulled into the AVS if appropriate.       "

## 2022-09-21 ENCOUNTER — OFFICE VISIT (OUTPATIENT)
Dept: INTERNAL MEDICINE | Facility: CLINIC | Age: 77
End: 2022-09-21

## 2022-09-21 VITALS
RESPIRATION RATE: 20 BRPM | BODY MASS INDEX: 26.68 KG/M2 | HEIGHT: 71 IN | SYSTOLIC BLOOD PRESSURE: 120 MMHG | HEART RATE: 67 BPM | TEMPERATURE: 98 F | OXYGEN SATURATION: 97 % | DIASTOLIC BLOOD PRESSURE: 70 MMHG | WEIGHT: 190.6 LBS

## 2022-09-21 DIAGNOSIS — I10 PRIMARY HYPERTENSION: Primary | ICD-10-CM

## 2022-09-21 DIAGNOSIS — D68.59 HYPERCOAGULABLE STATE: ICD-10-CM

## 2022-09-21 DIAGNOSIS — E78.2 MIXED HYPERLIPIDEMIA: ICD-10-CM

## 2022-09-21 DIAGNOSIS — R73.01 IMPAIRED FASTING GLUCOSE: ICD-10-CM

## 2022-09-21 PROCEDURE — 99214 OFFICE O/P EST MOD 30 MIN: CPT | Performed by: NURSE PRACTITIONER

## 2022-09-21 NOTE — PROGRESS NOTES
"Chief Complaint  Altered Mental Status (Said he was confused yesterday thought his remote was his cell phone.)    Subjective        Timi Wolf presents to [facility]  History of Present Illness  This is a 76 y/o male presenting to office for complaints of AMS that lasted for one hour yesterday on 9/21/22. Patient reports that his family reported he was \"talking funny\" and wasn't making much sense. Patient reports that they called EMS but he did not go to the ER for further evaluation because he returned back to normal cognitive functioning. Patient reports he did not eat anything at all yesterday and was curious if this could possibly be hypoglycemia. Patient denies any symptoms since this episode. Denies any dizziness or feeling \"off.\" Patient reports he has been back to his normal self.     Objective   Vital Signs:  /70 (BP Location: Left arm)   Pulse 67   Temp 98 °F (36.7 °C) (Temporal)   Resp 20   Ht 180.3 cm (70.98\")   Wt 86.5 kg (190 lb 9.6 oz)   SpO2 97%   BMI 26.60 kg/m²   Estimated body mass index is 26.6 kg/m² as calculated from the following:    Height as of this encounter: 180.3 cm (70.98\").    Weight as of this encounter: 86.5 kg (190 lb 9.6 oz).          Physical Exam  Vitals and nursing note reviewed.   Constitutional:       Appearance: Normal appearance.   HENT:      Head: Normocephalic and atraumatic.      Mouth/Throat:      Mouth: Mucous membranes are moist.   Eyes:      Pupils: Pupils are equal, round, and reactive to light.   Neck:      Vascular: No carotid bruit.   Cardiovascular:      Rate and Rhythm: Normal rate and regular rhythm.      Pulses: Normal pulses.      Heart sounds: Normal heart sounds. No murmur heard.    No gallop.   Pulmonary:      Effort: Pulmonary effort is normal. No respiratory distress.      Breath sounds: Normal breath sounds. No stridor. No wheezing, rhonchi or rales.   Abdominal:      General: Bowel sounds are normal. " There is no distension.      Palpations: Abdomen is soft.      Tenderness: There is no abdominal tenderness.   Musculoskeletal:         General: Normal range of motion.      Cervical back: Normal range of motion and neck supple.   Skin:     General: Skin is warm and dry.   Neurological:      General: No focal deficit present.      Mental Status: He is alert and oriented to person, place, and time. Mental status is at baseline.      Cranial Nerves: No cranial nerve deficit.      Sensory: No sensory deficit.      Motor: No weakness.      Coordination: Coordination normal.      Gait: Gait normal.      Deep Tendon Reflexes: Reflexes normal.   Psychiatric:         Mood and Affect: Mood normal.         Thought Content: Thought content normal.         Judgment: Judgment normal.        Result Review :  The following data was reviewed by: SPARKLE Tavares on 09/21/2022:  Common labs    Common Labs 12/1/21 5/26/22 5/26/22 6/22/22     1354 1354    Glucose   114 (A)    BUN   20    Creatinine   1.52 (A)    Sodium   141    Potassium   4.5    Chloride   106    Calcium   9.5    Total Protein   6.1    Albumin   4.1    Total Bilirubin   0.5    Alkaline Phosphatase   82    AST (SGOT)   15    ALT (SGPT)   14    WBC 6.05 7.5  7.72   Hemoglobin 15.6 15.3  15.1   Hematocrit 45.6 45.2  43.0   Platelets 103 (A) 126 (A)  113 (A)   (A) Abnormal value       Comments are available for some flowsheets but are not being displayed.                     Assessment and Plan   Diagnoses and all orders for this visit:    1. Primary hypertension (Primary)  Assessment & Plan:  Hypertension is improving with treatment.  Continue current treatment regimen.  Blood pressure will be reassessed at the next regular appointment.      2. Mixed hyperlipidemia  Assessment & Plan:  Continues on statin therapy.       3. Impaired fasting glucose  -     Hemoglobin A1c; Future    4. Hypercoagulable state (HCC)  Assessment & Plan:  Continues on eliquis.   Following  with CBC.       We discussed possibilities of TIA-- patient is currently on eliquis, high dose statin, and BP is well controlled. Patient does not have any neurocognitive deficits upon assessment/evaluation today. I do believe this may have been from hypoglycemia-- patient and I discussed making sure to eat at regular intervals to help stabilize blood glucose.        Follow Up   No follow-ups on file.  Patient was given instructions and counseling regarding his condition or for health maintenance advice. Please see specific information pulled into the AVS if appropriate.

## 2022-09-22 LAB
CONV .: NORMAL
Lab: NORMAL
PSA SERPL-MCNC: 1.4 NG/ML (ref 0–4)

## 2022-09-26 ENCOUNTER — TELEPHONE (OUTPATIENT)
Dept: ONCOLOGY | Facility: CLINIC | Age: 77
End: 2022-09-26

## 2022-09-26 ENCOUNTER — TELEPHONE (OUTPATIENT)
Dept: GASTROENTEROLOGY | Facility: CLINIC | Age: 77
End: 2022-09-26

## 2022-09-26 NOTE — TELEPHONE ENCOUNTER
Called pt and advised NPO after MN.  Advised to take AM meds with a sip of water.  Pt states he was just started on Eliquis.  Advised will contact Dr Magdaleno and ask about holding Eliquis.     Called Dr Magdaleno office, regarding holding Eliquis, the RN will ask Dr Magdaleno and call back.

## 2022-09-26 NOTE — TELEPHONE ENCOUNTER
Hub staff attempted to follow warm transfer process and was unsuccessful     Caller: Timi Wolf    Relationship to patient: Self    Best call back number: [phone]    Patient is needing: PATIENT HAS A PROCEDURE SCHEDULED WITH DR. ROSE ON Thursday AT 7:30AM AND IS NEEDING THE PREP PAPERWORK INSTRUCTIONS. OKAY TO TO EMAIL IF POSSIBLE AT [email or web address]

## 2022-09-26 NOTE — TELEPHONE ENCOUNTER
"Claudia Matta is calling to see if patient can stop Elquis for his EGD on 09-29-22.  Spoke with Dr Alvarado (Doc #2) patient to HOLD Eliquis 48 hours before EGD.  Roz Matta took a message for Claudia \"  patient to HOLD Eliquis 48 hours before EGD\"  "

## 2022-09-26 NOTE — TELEPHONE ENCOUNTER
Call from Dorcas with [facility] group.  States Dr Magdaleno out of office.  Anticoag question addressed by Dr Jono Alvarado.  Pt may hold Eliquis for 48 hours prior to EGD.     Message to Claudia LAMBERT

## 2022-09-29 ENCOUNTER — ANESTHESIA EVENT (OUTPATIENT)
Dept: GASTROENTEROLOGY | Facility: HOSPITAL | Age: 77
End: 2022-09-29

## 2022-09-29 ENCOUNTER — ANESTHESIA (OUTPATIENT)
Dept: GASTROENTEROLOGY | Facility: HOSPITAL | Age: 77
End: 2022-09-29

## 2022-09-29 ENCOUNTER — HOSPITAL ENCOUNTER (OUTPATIENT)
Facility: HOSPITAL | Age: 77
Setting detail: HOSPITAL OUTPATIENT SURGERY
Discharge: HOME | End: 2022-09-29
Attending: INTERNAL MEDICINE | Admitting: INTERNAL MEDICINE

## 2022-09-29 VITALS
HEIGHT: 71 IN | DIASTOLIC BLOOD PRESSURE: 65 MMHG | WEIGHT: 193 LBS | SYSTOLIC BLOOD PRESSURE: 114 MMHG | RESPIRATION RATE: 16 BRPM | HEART RATE: 48 BPM | OXYGEN SATURATION: 96 % | BODY MASS INDEX: 27.02 KG/M2

## 2022-09-29 DIAGNOSIS — R10.13 EPIGASTRIC PAIN: ICD-10-CM

## 2022-09-29 DIAGNOSIS — R13.10 DYSPHAGIA, UNSPECIFIED TYPE: ICD-10-CM

## 2022-09-29 DIAGNOSIS — R12 HEARTBURN: ICD-10-CM

## 2022-09-29 PROCEDURE — S0260 H&P FOR SURGERY: HCPCS | Performed by: INTERNAL MEDICINE

## 2022-09-29 PROCEDURE — 88305 TISSUE EXAM BY PATHOLOGIST: CPT | Performed by: INTERNAL MEDICINE

## 2022-09-29 PROCEDURE — 25010000002 PROPOFOL 10 MG/ML EMULSION: Performed by: NURSE ANESTHETIST, CERTIFIED REGISTERED

## 2022-09-29 PROCEDURE — 25010000002 ONDANSETRON PER 1 MG: Performed by: NURSE ANESTHETIST, CERTIFIED REGISTERED

## 2022-09-29 PROCEDURE — 43239 EGD BIOPSY SINGLE/MULTIPLE: CPT | Performed by: INTERNAL MEDICINE

## 2022-09-29 PROCEDURE — 43450 DILATE ESOPHAGUS 1/MULT PASS: CPT | Performed by: INTERNAL MEDICINE

## 2022-09-29 RX ORDER — LIDOCAINE HYDROCHLORIDE 20 MG/ML
INJECTION, SOLUTION INFILTRATION; PERINEURAL AS NEEDED
Status: DISCONTINUED | OUTPATIENT
Start: 2022-09-29 | End: 2022-09-29 | Stop reason: SURG

## 2022-09-29 RX ORDER — SODIUM CHLORIDE, SODIUM LACTATE, POTASSIUM CHLORIDE, CALCIUM CHLORIDE 600; 310; 30; 20 MG/100ML; MG/100ML; MG/100ML; MG/100ML
30 INJECTION, SOLUTION INTRAVENOUS CONTINUOUS PRN
Status: DISCONTINUED | OUTPATIENT
Start: 2022-09-29 | End: 2022-09-29 | Stop reason: HOSPADM

## 2022-09-29 RX ORDER — PROPOFOL 10 MG/ML
VIAL (ML) INTRAVENOUS AS NEEDED
Status: DISCONTINUED | OUTPATIENT
Start: 2022-09-29 | End: 2022-09-29 | Stop reason: SURG

## 2022-09-29 RX ORDER — PANTOPRAZOLE SODIUM 40 MG/1
40 TABLET, DELAYED RELEASE ORAL
Qty: 180 TABLET | Refills: 3 | Status: SHIPPED | OUTPATIENT
Start: 2022-09-29 | End: 2022-10-11 | Stop reason: SDUPTHER

## 2022-09-29 RX ORDER — ONDANSETRON 2 MG/ML
INJECTION INTRAMUSCULAR; INTRAVENOUS AS NEEDED
Status: DISCONTINUED | OUTPATIENT
Start: 2022-09-29 | End: 2022-09-29 | Stop reason: SURG

## 2022-09-29 RX ORDER — PROPOFOL 10 MG/ML
VIAL (ML) INTRAVENOUS CONTINUOUS PRN
Status: DISCONTINUED | OUTPATIENT
Start: 2022-09-29 | End: 2022-09-29 | Stop reason: SURG

## 2022-09-29 RX ADMIN — SODIUM CHLORIDE, POTASSIUM CHLORIDE, SODIUM LACTATE AND CALCIUM CHLORIDE 30 ML/HR: 600; 310; 30; 20 INJECTION, SOLUTION INTRAVENOUS at 08:08

## 2022-09-29 RX ADMIN — Medication 10 MG: at 08:43

## 2022-09-29 RX ADMIN — PROPOFOL 180 MCG/KG/MIN: 10 INJECTION, EMULSION INTRAVENOUS at 08:39

## 2022-09-29 RX ADMIN — Medication 20 MG: at 08:42

## 2022-09-29 RX ADMIN — Medication 20 MG: at 08:41

## 2022-09-29 RX ADMIN — LIDOCAINE HYDROCHLORIDE 30 MG: 20 INJECTION, SOLUTION INFILTRATION; PERINEURAL at 08:39

## 2022-09-29 RX ADMIN — Medication 50 MG: at 08:39

## 2022-09-29 RX ADMIN — ONDANSETRON 4 MG: 2 INJECTION INTRAMUSCULAR; INTRAVENOUS at 08:37

## 2022-09-29 NOTE — ANESTHESIA PREPROCEDURE EVALUATION
Anesthesia Evaluation     Patient summary reviewed   NPO Solid Status: > 8 hours             Airway   Mallampati: II  Neck ROM: full  No difficulty expected  Dental      Pulmonary     breath sounds clear to auscultation  Cardiovascular     ECG reviewed  Rhythm: regular    (+) hypertension, CAD,       Neuro/Psych  GI/Hepatic/Renal/Endo    (+)  GERD,      ROS Comment: Dysphagia      Musculoskeletal     Abdominal    Substance History      OB/GYN          Other   blood dyscrasia thrombocytopenia,                   Anesthesia Plan    ASA 3     MAC       Anesthetic plan, risks, benefits, and alternatives have been provided, discussed and informed consent has been obtained with: patient.        CODE STATUS:

## 2022-09-29 NOTE — ANESTHESIA POSTPROCEDURE EVALUATION
Patient: Timi Wolf    Procedure Summary     Date: 09/29/22 Room / Location:  [facility] /  [facility]    Anesthesia Start: 0835 Anesthesia Stop: 0852    Procedure: ESOPHAGOGASTRODUODENOSCOPY with biopsy, wilson dilation (N/A Esophagus) Diagnosis:       Epigastric pain      Dysphagia, unspecified type      Heartburn      (Epigastric pain [R10.13])      (Dysphagia, unspecified type [R13.10])      (Heartburn [R12])    Surgeons: Noel Alvarado MD Provider: Eliecer Singh MD    Anesthesia Type: MAC ASA Status: 3          Anesthesia Type: MAC    Vitals  Vitals Value Taken Time   /65 09/29/22 0909   Temp     Pulse 48 09/29/22 0909   Resp 16 09/29/22 0909   SpO2 96 % 09/29/22 0909           Post Anesthesia Care and Evaluation    Patient location during evaluation: PACU  Patient participation: complete - patient participated  Level of consciousness: awake  Pain score: 1  Pain management: adequate    Airway patency: patent  Anesthetic complications: No anesthetic complications  PONV Status: none  Cardiovascular status: acceptable  Respiratory status: acceptable  Hydration status: acceptable

## 2022-09-30 LAB
LAB AP CASE REPORT: NORMAL
PATH REPORT.FINAL DX SPEC: NORMAL
PATH REPORT.GROSS SPEC: NORMAL

## 2022-10-06 ENCOUNTER — TELEPHONE (OUTPATIENT)
Dept: GASTROENTEROLOGY | Facility: CLINIC | Age: 77
End: 2022-10-06

## 2022-10-06 NOTE — TELEPHONE ENCOUNTER
----- Message from Noel Alvarado MD sent at 10/4/2022  7:00 AM EDT -----  Pathology benign  Continue PPI  Office visit [facility] 8 to 12 weeks

## 2022-10-06 NOTE — TELEPHONE ENCOUNTER
Patient notified of results and recommendations and verbalized understanding    He said the pantoprazole is too expensive for him and said that he has been on omeprazole, he is unsure if the strength, but is asking if he can take this, as it may not cost as much.

## 2022-10-07 NOTE — TELEPHONE ENCOUNTER
Called and passed on Dr Alvarado's recommendations.    Pt stated he has an old bottle of pantoprazole 40mg he is going to finish what he has.  Advised Dr Marcano had recommended BID dosing.      He will call back at a later date for refill on omeprazole, after he is out of what he has at home.  Also advised him to reach out to his pharmacy to see if a PA needs to be done for the BID dosing on pantoprazole. He will have all this info when he calls back for the refill  Thanks'

## 2022-10-11 ENCOUNTER — TELEPHONE (OUTPATIENT)
Dept: GASTROENTEROLOGY | Facility: CLINIC | Age: 77
End: 2022-10-11

## 2022-10-11 RX ORDER — PANTOPRAZOLE SODIUM 40 MG/1
40 TABLET, DELAYED RELEASE ORAL
Qty: 180 TABLET | Refills: 3 | Status: SHIPPED | OUTPATIENT
Start: 2022-10-11 | End: 2023-02-20 | Stop reason: SDUPTHER

## 2022-10-11 NOTE — TELEPHONE ENCOUNTER
Caller: Timi Wolf    Relationship: Self    Best call back number: [phone]    Requested Prescriptions: PANTOPRAZOLE 40MG 2X DAY  Requested Prescriptions      No prescriptions requested or ordered in this encounter        Pharmacy where request should be sent:  [facility] [phone]    Additional details provided by patient: PT REQUESTS NEW PRESCRIPTION BE SENT TO [facility] AS IT MAY BE CHEAPER    Does the patient have less than a 3 day supply:  [x] Yes  [] No    Victoriano Donis Rep   10/11/22 08:53 EDT

## 2022-11-13 DIAGNOSIS — I10 ESSENTIAL HYPERTENSION: ICD-10-CM

## 2022-11-13 DIAGNOSIS — N40.0 PROSTATE ENLARGEMENT: ICD-10-CM

## 2022-11-14 RX ORDER — TAMSULOSIN HYDROCHLORIDE 0.4 MG/1
CAPSULE ORAL
Qty: 90 CAPSULE | Refills: 3 | Status: SHIPPED | OUTPATIENT
Start: 2022-11-14 | End: 2023-01-05 | Stop reason: SDUPTHER

## 2022-11-14 RX ORDER — METOPROLOL SUCCINATE 50 MG/1
TABLET, EXTENDED RELEASE ORAL
Qty: 90 TABLET | Refills: 3 | Status: SHIPPED | OUTPATIENT
Start: 2022-11-14 | End: 2023-01-05 | Stop reason: SDUPTHER

## 2023-01-05 DIAGNOSIS — N40.0 PROSTATE ENLARGEMENT: ICD-10-CM

## 2023-01-05 DIAGNOSIS — I10 ESSENTIAL HYPERTENSION: ICD-10-CM

## 2023-01-05 DIAGNOSIS — E78.2 MIXED HYPERLIPIDEMIA: ICD-10-CM

## 2023-01-05 RX ORDER — METOPROLOL SUCCINATE 50 MG/1
50 TABLET, EXTENDED RELEASE ORAL DAILY
Qty: 90 TABLET | Refills: 1 | Status: SHIPPED | OUTPATIENT
Start: 2023-01-05

## 2023-01-05 RX ORDER — AMLODIPINE BESYLATE AND BENAZEPRIL HYDROCHLORIDE 5; 20 MG/1; MG/1
1 CAPSULE ORAL DAILY
Qty: 90 CAPSULE | Refills: 1 | Status: SHIPPED | OUTPATIENT
Start: 2023-01-05 | End: 2023-03-14 | Stop reason: SDUPTHER

## 2023-01-05 RX ORDER — PRAVASTATIN SODIUM 80 MG/1
80 TABLET ORAL NIGHTLY
Qty: 90 TABLET | Refills: 1 | Status: SHIPPED | OUTPATIENT
Start: 2023-01-05 | End: 2023-03-14 | Stop reason: SDUPTHER

## 2023-01-05 RX ORDER — TAMSULOSIN HYDROCHLORIDE 0.4 MG/1
1 CAPSULE ORAL DAILY
Qty: 90 CAPSULE | Refills: 1 | Status: SHIPPED | OUTPATIENT
Start: 2023-01-05 | End: 2023-02-13 | Stop reason: SDUPTHER

## 2023-01-05 NOTE — TELEPHONE ENCOUNTER
Caller: Timi Wolf    Relationship: Self    Best call back number: [phone]      Requested Prescriptions:   Requested Prescriptions     Pending Prescriptions Disp Refills   • metoprolol succinate XL (TOPROL-XL) 50 MG 24 hr tablet 90 tablet 3     Sig: Take 1 tablet by mouth Daily.   • tamsulosin (FLOMAX) 0.4 MG capsule 24 hr capsule 90 capsule 3     Sig: Take 1 capsule by mouth Daily.   • pravastatin (PRAVACHOL) 80 MG tablet 90 tablet 3     Sig: Take 1 tablet by mouth Every Night.   • amLODIPine-benazepril (LOTREL 5-20) 5-20 MG per capsule 90 capsule 3     Sig: Take 1 capsule by mouth Daily.        Pharmacy where request should be sent: [facility] - [address] [phone] [facility] [phone] [place]     Additional details provided by patient: IT IS COMING MAIL ORDER.    Does the patient have less than a 3 day supply:  [] Yes  [x] No    Would you like a call back once the refill request has been completed: [] Yes [x] No    If the office needs to give you a call back, can they leave a voicemail: [] Yes [x] No    Neha Roberts, PCT   01/05/23 08:36 EST

## 2023-01-06 ENCOUNTER — TELEPHONE (OUTPATIENT)
Dept: ONCOLOGY | Facility: CLINIC | Age: 78
End: 2023-01-06
Payer: MEDICARE

## 2023-01-06 NOTE — TELEPHONE ENCOUNTER
Caller: Timi Wolf    Relationship: Self    Best call back number: [phone]    Requested Prescriptions:   Requested Prescriptions     Pending Prescriptions Disp Refills   • apixaban (Eliquis) 5 MG tablet tablet 90 tablet 7     Sig: Take 1 tablet by mouth Every 12 (Twelve) Hours.        Pharmacy where request should be sent: [facility] - [facility] [address] [phone] [facility] [phone] FX     Additional details provided by patient: PATIENT IS UPDATING TO NEW PHARMACY    Does the patient have less than a 3 day supply:  [] Yes  [x] No    Would you like a call back once the refill request has been completed: [x] Yes [] No    If the office needs to give you a call back, can they leave a voicemail: [x] Yes [] No    Victoriano Gruber Rep   01/06/23 09:31 EST

## 2023-02-13 ENCOUNTER — TELEPHONE (OUTPATIENT)
Dept: INTERNAL MEDICINE | Facility: CLINIC | Age: 78
End: 2023-02-13
Payer: MEDICARE

## 2023-02-13 DIAGNOSIS — N40.0 PROSTATE ENLARGEMENT: ICD-10-CM

## 2023-02-13 RX ORDER — TAMSULOSIN HYDROCHLORIDE 0.4 MG/1
1 CAPSULE ORAL DAILY
Qty: 90 CAPSULE | Refills: 1 | Status: SHIPPED | OUTPATIENT
Start: 2023-02-13

## 2023-02-13 NOTE — TELEPHONE ENCOUNTER
Caller: Timi Wolf    Relationship: Self    Best call back number:   [phone]          Requested Prescriptions:    tamsulosin (FLOMAX) 0.4 MG capsule 24 hr capsule     Pharmacy where request should be sent:    [facility] ) - [place] - [address] [facility] [phone]  - [phone]   [phone]  Additional details provided by patient: PATIENT IS REQUESTING A NEW 90 DAY PRESCRIPTION WITH REFILLS    Does the patient have less than a 3 day supply:  [x] Yes  [] No    Would you like a call back once the refill request has been completed: [] Yes [x] No    If the office needs to give you a call back, can they leave a voicemail: [] Yes [x] No    Mar Matson, RegSched Rep   02/13/23 08:54 EST     PLEASE ADVISE.

## 2023-02-20 ENCOUNTER — OFFICE VISIT (OUTPATIENT)
Dept: INTERNAL MEDICINE | Facility: CLINIC | Age: 78
End: 2023-02-20
Payer: MEDICARE

## 2023-02-20 VITALS
OXYGEN SATURATION: 94 % | TEMPERATURE: 98 F | SYSTOLIC BLOOD PRESSURE: 135 MMHG | DIASTOLIC BLOOD PRESSURE: 77 MMHG | HEART RATE: 59 BPM | WEIGHT: 196.6 LBS | HEIGHT: 71 IN | BODY MASS INDEX: 27.52 KG/M2

## 2023-02-20 DIAGNOSIS — I81 PORTAL VEIN THROMBOSIS: ICD-10-CM

## 2023-02-20 DIAGNOSIS — R13.10 DYSPHAGIA, UNSPECIFIED TYPE: Primary | ICD-10-CM

## 2023-02-20 DIAGNOSIS — I10 PRIMARY HYPERTENSION: ICD-10-CM

## 2023-02-20 DIAGNOSIS — R12 HEARTBURN: ICD-10-CM

## 2023-02-20 DIAGNOSIS — E78.2 MIXED HYPERLIPIDEMIA: ICD-10-CM

## 2023-02-20 DIAGNOSIS — F32.9 REACTIVE DEPRESSION (SITUATIONAL): ICD-10-CM

## 2023-02-20 DIAGNOSIS — Z00.00 MEDICARE ANNUAL WELLNESS VISIT, SUBSEQUENT: ICD-10-CM

## 2023-02-20 PROCEDURE — 96160 PT-FOCUSED HLTH RISK ASSMT: CPT | Performed by: FAMILY MEDICINE

## 2023-02-20 PROCEDURE — 1170F FXNL STATUS ASSESSED: CPT | Performed by: FAMILY MEDICINE

## 2023-02-20 PROCEDURE — 1125F AMNT PAIN NOTED PAIN PRSNT: CPT | Performed by: FAMILY MEDICINE

## 2023-02-20 PROCEDURE — 99214 OFFICE O/P EST MOD 30 MIN: CPT | Performed by: FAMILY MEDICINE

## 2023-02-20 PROCEDURE — G0439 PPPS, SUBSEQ VISIT: HCPCS | Performed by: FAMILY MEDICINE

## 2023-02-20 PROCEDURE — 1159F MED LIST DOCD IN RCRD: CPT | Performed by: FAMILY MEDICINE

## 2023-02-20 RX ORDER — ESCITALOPRAM OXALATE 5 MG/1
5 TABLET ORAL DAILY
Qty: 30 TABLET | Refills: 2 | Status: SHIPPED | OUTPATIENT
Start: 2023-02-20 | End: 2023-04-04 | Stop reason: SDUPTHER

## 2023-02-20 RX ORDER — PANTOPRAZOLE SODIUM 40 MG/1
40 TABLET, DELAYED RELEASE ORAL
Qty: 180 TABLET | Refills: 3 | Status: SHIPPED | OUTPATIENT
Start: 2023-02-20 | End: 2023-03-30

## 2023-02-20 NOTE — PATIENT INSTRUCTIONS
Medicare Wellness  Personal Prevention Plan of Service     Date of Office Visit:    Encounter Provider:  Stanley Ruiz MD  Place of Service:  [facility]  Patient Name: Timi Wolf  :  1945    As part of the Medicare Wellness portion of your visit today, we are providing you with this personalized preventive plan of services (PPPS). This plan is based upon recommendations of the [place] Preventive Services Task Force (USPSTF) and the Advisory Committee on Immunization Practices (ACIP).    This lists the preventive care services that should be considered, and provides dates of when you are due. Items listed as completed are up-to-date and do not require any further intervention.    Health Maintenance   Topic Date Due    LIPID PANEL  2022    ANNUAL WELLNESS VISIT  2024    TDAP/TD VACCINES (3 - Td or Tdap) 2024    COLORECTAL CANCER SCREENING  2025    HEPATITIS C SCREENING  Completed    COVID-19 Vaccine  Completed    INFLUENZA VACCINE  Completed    Pneumococcal Vaccine 65+  Completed    AAA SCREEN (ONE-TIME)  Completed    ZOSTER VACCINE  Completed       Orders Placed This Encounter   Procedures    Ambulatory Referral to Gastroenterology     Referral Priority:   Routine     Referral Type:   Consultation     Referral Reason:   Specialty Services Required     Referred to Provider:   Noel Alvarado MD     Requested Specialty:   Gastroenterology     Number of Visits Requested:   1       Return in about 6 months (around 2023) for Recheck.

## 2023-02-20 NOTE — PROGRESS NOTES
The ABCs of the Annual Wellness Visit  Subsequent Medicare Wellness Visit    Subjective      Timi Wolf is a 77 y.o. male who presents for a Subsequent Medicare Wellness Visit.    The following portions of the patient's history were reviewed and   updated as appropriate: allergies, current medications, past family history, past medical history, past social history, past surgical history and problem list.    Compared to one year ago, the patient feels his physical   health is the same.    Compared to one year ago, the patient feels his mental   health is worse.    Recent Hospitalizations:  He was not admitted to the hospital during the last year.       Current Medical Providers:  Patient Care Team:  Stanley Ruiz MD as PCP - General (Family Medicine)  Chriss Villatoro Jr., MD as Consulting Physician (Urology)  Luis M Alonso MD as Referring Physician (Infectious Diseases)  Danie Magdaleno MD as Consulting Physician (Hematology and Oncology)    Outpatient Medications Prior to Visit   Medication Sig Dispense Refill   • amLODIPine-benazepril (LOTREL 5-20) 5-20 MG per capsule Take 1 capsule by mouth Daily. 90 capsule 1   • apixaban (Eliquis) 5 MG tablet tablet Take 1 tablet by mouth Every 12 (Twelve) Hours. 90 tablet 1   • Coenzyme Q10 150 MG capsule Take 1 tablet by mouth Every Night.     • Melatonin 3 MG capsule Take 1 capsule by mouth Every Night.     • metoprolol succinate XL (TOPROL-XL) 50 MG 24 hr tablet Take 1 tablet by mouth Daily. 90 tablet 1   • pantoprazole (PROTONIX) 40 MG EC tablet Take 1 tablet by mouth 2 (Two) Times a Day Before Meals. 180 tablet 3   • pravastatin (PRAVACHOL) 80 MG tablet Take 1 tablet by mouth Every Night. 90 tablet 1   • tamsulosin (FLOMAX) 0.4 MG capsule 24 hr capsule Take 1 capsule by mouth Daily. 90 capsule 1     No facility-administered medications prior to visit.       No opioid medication identified on active medication list. I have reviewed chart for other potential  high  "risk medication/s and harmful drug interactions in the elderly.          Aspirin is not on active medication list.  Aspirin use is contraindicated for this patient due to: current use of Eliquis.  .    Patient Active Problem List   Diagnosis   • Chronic coronary artery disease   • Hyperlipidemia   • Hypertension   • Interstitial cystitis (chronic) without hematuria   • Prostate enlargement   • Epigastric pain   • Portal vein thrombosis   • Hypercoagulable state (HCC)   • CKD (chronic kidney disease) 3a   • Thrombocytopenia (HCC)   • Dysphagia   • Heartburn   • Factor 5 Leiden mutation, heterozygous (HCC)   • Upper respiratory tract infection due to COVID-19 virus   • Impaired fasting glucose     Advance Care Planning  Advance Directive is not on file.  ACP discussion was held with the patient during this visit. Patient has an advance directive (not in EMR), copy requested.     Objective    Vitals:    02/20/23 1507   BP: 135/77   BP Location: Right arm   Patient Position: Sitting   Cuff Size: Adult   Pulse: 59   Temp: 98 °F (36.7 °C)   TempSrc: Infrared   SpO2: 94%   Weight: 89.2 kg (196 lb 9.6 oz)   Height: 180.3 cm (71\")   PainSc:   2   PainLoc: Chest     Estimated body mass index is 27.42 kg/m² as calculated from the following:    Height as of this encounter: 180.3 cm (71\").    Weight as of this encounter: 89.2 kg (196 lb 9.6 oz).    BMI is >= 25 and <30. (Overweight) The following options were offered after discussion;: exercise counseling/recommendations      Does the patient have evidence of cognitive impairment?   No            HEALTH RISK ASSESSMENT    Smoking Status:  Social History     Tobacco Use   Smoking Status Former   Smokeless Tobacco Never     Alcohol Consumption:  Social History     Substance and Sexual Activity   Alcohol Use No     Fall Risk Screen:    STEADI Fall Risk Assessment was completed, and patient is at LOW risk for falls.Assessment completed on:2/20/2023    Depression " Screening:  PHQ-2/PHQ-9 Depression Screening 2/20/2023   Little Interest or Pleasure in Doing Things 1-->several days   Feeling Down, Depressed or Hopeless 3-->nearly every day   Trouble Falling or Staying Asleep, or Sleeping Too Much 3-->nearly every day   Feeling Tired or Having Little Energy 0-->not at all   Poor Appetite or Overeating 0-->not at all   Feeling Bad about Yourself - or that You are a Failure or Have Let Yourself or Your Family Down 0-->not at all   Trouble Concentrating on Things, Such as Reading the Newspaper or Watching Television 0-->not at all   Moving or Speaking So Slowly that Other People Could Have Noticed? Or the Opposite - Being So Fidgety 0-->not at all   Thoughts that You Would be Better Off Dead or of Hurting Yourself in Some Way 0-->not at all   PHQ-9: Brief Depression Severity Measure Score 7   If You Checked Off Any Problems, How Difficult Have These Problems Made It For You to Do Your Work, Take Care of Things at Home, or Get Along with Other People? not difficult at all       Health Habits and Functional and Cognitive Screening:  Functional & Cognitive Status 2/20/2023   Do you have difficulty preparing food and eating? No   Do you have difficulty bathing yourself, getting dressed or grooming yourself? No   Do you have difficulty using the toilet? No   Do you have difficulty moving around from place to place? No   Do you have trouble with steps or getting out of a bed or a chair? No   Current Diet Limited Junk Food   Dental Exam Up to date   Eye Exam Up to date   Exercise (times per week) 1 times per week   Current Exercises Include Walking   Current Exercise Activities Include -   Do you need help using the phone?  No   Are you deaf or do you have serious difficulty hearing?  Yes   Do you need help with transportation? No   Do you need help shopping? No   Do you need help preparing meals?  No   Do you need help with housework?  No   Do you need help with laundry? No   Do you need  help taking your medications? No   Do you need help managing money? No   Do you ever drive or ride in a car without wearing a seat belt? No   Have you felt unusual stress, anger or loneliness in the last month? Yes   Who do you live with? Spouse   If you need help, do you have trouble finding someone available to you? No   Have you been bothered in the last four weeks by sexual problems? No   Do you have difficulty concentrating, remembering or making decisions? No       Age-appropriate Screening Schedule:  Refer to the list below for future screening recommendations based on patient's age, sex and/or medical conditions. Orders for these recommended tests are listed in the plan section. The patient has been provided with a written plan.    Health Maintenance   Topic Date Due   • LIPID PANEL  11/22/2022   • TDAP/TD VACCINES (3 - Td or Tdap) 05/18/2024   • INFLUENZA VACCINE  Completed   • ZOSTER VACCINE  Completed                CMS Preventative Services Quick Reference  Risk Factors Identified During Encounter:    Depression/Dysphoria: Prescribed new antidepressant medication treatment. Follow up visit planned.    The above risks/problems have been discussed with the patient.  Pertinent information has been shared with the patient in the After Visit Summary.    There are no diagnoses linked to this encounter.    Follow Up:   Next Medicare Wellness visit to be scheduled in 1 year.      An After Visit Summary and PPPS were made available to the patient.

## 2023-02-20 NOTE — PROGRESS NOTES
"Chief Complaint  Medicare Wellness-subsequent, Depression, portal vein thrombosis, Med Refill (protonix), Heartburn, and Anxiety    Subjective        Timi Wolf presents to [facility]  History of Present Illness  Timi was having a difficult time with current situation putting his mom in a nursing home with some very difficult decisions as well as his wife's immobility and health issues.  He is not suicidal and is dealing with everything but it cyst relatively depressing as far as trying to predict the future.  We will place him on a small dose of Lexapro 5 mg daily and he will give me an update as far as how he is doing on it.    Otherwise he has had the sensation of a lump in the chest but has had esophageal dilatation by gastroenterology but ran out of pantoprazole 40 mg twice daily about 3 weeks ago.  We will provide her refill for at least 3 months and contact [facility] office for follow-up.    Otherwise he has follow-up with Dr. Arndt for portal vein thrombosis and he is on chronic Eliquis 5 mg twice daily.  Blood pressure due to the amlodipine benazepril 5/20 daily and metoprolol XL 50 mg daily.    Cholesterol treated with pravastatin 80 mg daily.    Tamsulosin 0.4 is taken for prostate.      Objective   Vital Signs:  /77 (BP Location: Right arm, Patient Position: Sitting, Cuff Size: Adult)   Pulse 59   Temp 98 °F (36.7 °C) (Infrared)   Ht 180.3 cm (71\")   Wt 89.2 kg (196 lb 9.6 oz)   SpO2 94%   BMI 27.42 kg/m²   Estimated body mass index is 27.42 kg/m² as calculated from the following:    Height as of this encounter: 180.3 cm (71\").    Weight as of this encounter: 89.2 kg (196 lb 9.6 oz).             Physical Exam  Vitals reviewed.   Constitutional:       Appearance: He is well-developed.   HENT:      Head: Normocephalic and atraumatic.      Right Ear: Tympanic membrane and external ear normal.      Left Ear: Tympanic membrane and external ear normal.      Nose: " Nose normal.   Eyes:      Conjunctiva/sclera: Conjunctivae normal.      Pupils: Pupils are equal, round, and reactive to light.   Neck:      Thyroid: No thyromegaly.      Vascular: No JVD.   Cardiovascular:      Rate and Rhythm: Normal rate and regular rhythm.      Heart sounds: Normal heart sounds.   Pulmonary:      Effort: Pulmonary effort is normal.      Breath sounds: Normal breath sounds.   Abdominal:      General: Bowel sounds are normal.      Palpations: Abdomen is soft.   Musculoskeletal:         General: Normal range of motion.      Cervical back: Normal range of motion and neck supple.   Lymphadenopathy:      Cervical: No cervical adenopathy.   Skin:     General: Skin is warm and dry.      Findings: No rash.   Neurological:      Mental Status: He is alert and oriented to person, place, and time.      Cranial Nerves: No cranial nerve deficit.      Coordination: Coordination normal.   Psychiatric:         Attention and Perception: Attention normal.         Mood and Affect: Mood is depressed.         Behavior: Behavior normal.         Thought Content: Thought content normal.         Judgment: Judgment normal.        Result Review :                   Assessment and Plan   Diagnoses and all orders for this visit:    1. Dysphagia, unspecified type (Primary)  Comments:  Pantoprazole 40 mg twice daily follow-up with gastro.  Orders:  -     Ambulatory Referral to Gastroenterology    2. Heartburn  -     Ambulatory Referral to Gastroenterology    3. Mixed hyperlipidemia  Comments:  Pravastatin 80 mg daily    4. Primary hypertension  Comments:  Amlodipine benazepril 5/20 daily metoprolol XL 50 mg daily    5. Portal vein thrombosis  Comments:  Eliquis 5 mg twice daily    6. Medicare annual wellness visit, subsequent    7. Reactive depression (situational)  Comments:  Lexapro 5 mg daily    Other orders  -     pantoprazole (PROTONIX) 40 MG EC tablet; Take 1 tablet by mouth 2 (Two) Times a Day Before Meals.  Dispense: 180  tablet; Refill: 3  -     escitalopram (Lexapro) 5 MG tablet; Take 1 tablet by mouth Daily.  Dispense: 30 tablet; Refill: 2             Follow Up   Return in about 6 months (around 8/20/2023) for Recheck.  Patient was given instructions and counseling regarding his condition or for health maintenance advice. Please see specific information pulled into the AVS if appropriate.

## 2023-02-23 ENCOUNTER — LAB (OUTPATIENT)
Dept: LAB | Facility: HOSPITAL | Age: 78
End: 2023-02-23
Payer: MEDICARE

## 2023-02-23 ENCOUNTER — OFFICE VISIT (OUTPATIENT)
Dept: ONCOLOGY | Facility: CLINIC | Age: 78
End: 2023-02-23
Payer: MEDICARE

## 2023-02-23 VITALS
RESPIRATION RATE: 16 BRPM | SYSTOLIC BLOOD PRESSURE: 119 MMHG | DIASTOLIC BLOOD PRESSURE: 75 MMHG | HEART RATE: 60 BPM | TEMPERATURE: 97.3 F | OXYGEN SATURATION: 96 % | BODY MASS INDEX: 26.95 KG/M2 | HEIGHT: 71 IN | WEIGHT: 192.5 LBS

## 2023-02-23 DIAGNOSIS — R13.14 PHARYNGOESOPHAGEAL DYSPHAGIA: ICD-10-CM

## 2023-02-23 DIAGNOSIS — Z79.01 CURRENT USE OF LONG TERM ANTICOAGULATION: ICD-10-CM

## 2023-02-23 DIAGNOSIS — D68.51 FACTOR 5 LEIDEN MUTATION, HETEROZYGOUS: ICD-10-CM

## 2023-02-23 DIAGNOSIS — D69.6 THROMBOCYTOPENIA: ICD-10-CM

## 2023-02-23 DIAGNOSIS — D68.59 HYPERCOAGULABLE STATE: ICD-10-CM

## 2023-02-23 DIAGNOSIS — I81 PORTAL VEIN THROMBOSIS: ICD-10-CM

## 2023-02-23 DIAGNOSIS — D68.51 FACTOR 5 LEIDEN MUTATION, HETEROZYGOUS: Primary | ICD-10-CM

## 2023-02-23 LAB
ALBUMIN SERPL-MCNC: 4.1 G/DL (ref 3.5–5.2)
ALBUMIN/GLOB SERPL: 1.9 G/DL (ref 1.1–2.4)
ALP SERPL-CCNC: 66 U/L (ref 38–116)
ALT SERPL W P-5'-P-CCNC: 12 U/L (ref 0–41)
ANION GAP SERPL CALCULATED.3IONS-SCNC: 11.5 MMOL/L (ref 5–15)
AST SERPL-CCNC: 15 U/L (ref 0–40)
BASOPHILS # BLD AUTO: 0.05 10*3/MM3 (ref 0–0.2)
BASOPHILS NFR BLD AUTO: 0.8 % (ref 0–1.5)
BILIRUB SERPL-MCNC: 0.9 MG/DL (ref 0.2–1.2)
BUN SERPL-MCNC: 19 MG/DL (ref 6–20)
BUN/CREAT SERPL: 13 (ref 7.3–30)
CALCIUM SPEC-SCNC: 10.1 MG/DL (ref 8.5–10.2)
CHLORIDE SERPL-SCNC: 108 MMOL/L (ref 98–107)
CO2 SERPL-SCNC: 21.5 MMOL/L (ref 22–29)
CREAT SERPL-MCNC: 1.46 MG/DL (ref 0.7–1.3)
DEPRECATED RDW RBC AUTO: 39.6 FL (ref 37–54)
EGFRCR SERPLBLD CKD-EPI 2021: 49.8 ML/MIN/1.73
EOSINOPHIL # BLD AUTO: 0.22 10*3/MM3 (ref 0–0.4)
EOSINOPHIL NFR BLD AUTO: 3.7 % (ref 0.3–6.2)
ERYTHROCYTE [DISTWIDTH] IN BLOOD BY AUTOMATED COUNT: 12.2 % (ref 12.3–15.4)
GLOBULIN UR ELPH-MCNC: 2.2 GM/DL (ref 1.8–3.5)
GLUCOSE SERPL-MCNC: 105 MG/DL (ref 74–124)
HCT VFR BLD AUTO: 44.1 % (ref 37.5–51)
HGB BLD-MCNC: 15.5 G/DL (ref 13–17.7)
IMM GRANULOCYTES # BLD AUTO: 0.02 10*3/MM3 (ref 0–0.05)
IMM GRANULOCYTES NFR BLD AUTO: 0.3 % (ref 0–0.5)
LYMPHOCYTES # BLD AUTO: 1.2 10*3/MM3 (ref 0.7–3.1)
LYMPHOCYTES NFR BLD AUTO: 20.2 % (ref 19.6–45.3)
MCH RBC QN AUTO: 31.3 PG (ref 26.6–33)
MCHC RBC AUTO-ENTMCNC: 35.1 G/DL (ref 31.5–35.7)
MCV RBC AUTO: 89.1 FL (ref 79–97)
MONOCYTES # BLD AUTO: 0.57 10*3/MM3 (ref 0.1–0.9)
MONOCYTES NFR BLD AUTO: 9.6 % (ref 5–12)
NEUTROPHILS NFR BLD AUTO: 3.89 10*3/MM3 (ref 1.7–7)
NEUTROPHILS NFR BLD AUTO: 65.4 % (ref 42.7–76)
NRBC BLD AUTO-RTO: 0 /100 WBC (ref 0–0.2)
PLATELET # BLD AUTO: 104 10*3/MM3 (ref 140–450)
PMV BLD AUTO: 9.4 FL (ref 6–12)
POTASSIUM SERPL-SCNC: 4.5 MMOL/L (ref 3.5–4.7)
PROT SERPL-MCNC: 6.3 G/DL (ref 6.3–8)
RBC # BLD AUTO: 4.95 10*6/MM3 (ref 4.14–5.8)
SODIUM SERPL-SCNC: 141 MMOL/L (ref 134–145)
WBC NRBC COR # BLD: 5.95 10*3/MM3 (ref 3.4–10.8)

## 2023-02-23 PROCEDURE — 36415 COLL VENOUS BLD VENIPUNCTURE: CPT

## 2023-02-23 PROCEDURE — 80053 COMPREHEN METABOLIC PANEL: CPT

## 2023-02-23 PROCEDURE — 85025 COMPLETE CBC W/AUTO DIFF WBC: CPT

## 2023-02-23 PROCEDURE — 99214 OFFICE O/P EST MOD 30 MIN: CPT | Performed by: INTERNAL MEDICINE

## 2023-02-23 NOTE — PROGRESS NOTES
Subjective        REASONS FOR FOLLOWUP: pylephlebitis, FACTOR V LEIDEN MUTATION, ANTIGLYCOPROTEIN ANTIBODY POSITIVE     HISTORY OF PRESENT ILLNESS:    DURING THE VISIT WITH THE PATIENT TODAY , PATIENT HAD FACE MASK, I HAD PROPER PROTECTIVE EQUIPMENT, AND I DID HAND HYGIENE WITH SOAP AND WATER BEFORE AND AFTER THE VISIT.    On 02/23/2023 the patient returns to the office with no abdominal pain, nausea or vomiting. When we saw him 6 months ago he was having dysphagia. He underwent endoscopy and radiological assessment finding no anatomical obstruction. He had dilatation of the esophagus with no benefit. He still believes that sometimes certain foods get stuck in the lower part of the esophagus. Eventually this opens up and goes through. He has not had any regurgitation. Besides this the patient denies any other new problems. He is under a lot of stress with the illness of his wife and his mother also has had new problems and she will require to be relocated in a nursing facility. He is very stressed out about the cost and the logistics of all this.          Past Medical History:   Diagnosis Date   • Abdominal pain    • Arteriosclerotic cardiovascular disease    • Benign prostatic hyperplasia    • Cataract    • Diverticulosis    • Dysphagia    • GERD (gastroesophageal reflux disease)    • H/O Interstitial cystitis    • HL (hearing loss)    • Hyperlipidemia    • Hypertension    • Internal hemorrhoids    • Prostate enlargement    • Pulmonary embolism (HCC)    • Renal cyst, right    • Umbilical hernia         Past Surgical History:   Procedure Laterality Date   • ACHILLES TENDON REPAIR      ruptured tendon   • COLONOSCOPY N/A 2012    Dr Alvarado/[facility]   • ENDOSCOPY     • ENDOSCOPY N/A 9/29/2022    Procedure: ESOPHAGOGASTRODUODENOSCOPY with biopsy, wilson dilation;  Surgeon: Noel Alvarado MD;  Location: [facility] ENDOSCOPY;  Service: Gastroenterology;  Laterality: N/A;  hiatal hernia, gastritis   • SHOULDER SURGERY     •  TONSILLECTOMY          Current Outpatient Medications on File Prior to Visit   Medication Sig Dispense Refill   • amLODIPine-benazepril (LOTREL 5-20) 5-20 MG per capsule Take 1 capsule by mouth Daily. 90 capsule 1   • apixaban (Eliquis) 5 MG tablet tablet Take 1 tablet by mouth Every 12 (Twelve) Hours. 90 tablet 1   • Coenzyme Q10 150 MG capsule Take 1 tablet by mouth Every Night.     • escitalopram (Lexapro) 5 MG tablet Take 1 tablet by mouth Daily. 30 tablet 2   • Melatonin 3 MG capsule Take 1 capsule by mouth Every Night.     • metoprolol succinate XL (TOPROL-XL) 50 MG 24 hr tablet Take 1 tablet by mouth Daily. 90 tablet 1   • pantoprazole (PROTONIX) 40 MG EC tablet Take 1 tablet by mouth 2 (Two) Times a Day Before Meals. 180 tablet 3   • pravastatin (PRAVACHOL) 80 MG tablet Take 1 tablet by mouth Every Night. 90 tablet 1   • tamsulosin (FLOMAX) 0.4 MG capsule 24 hr capsule Take 1 capsule by mouth Daily. 90 capsule 1     No current facility-administered medications on file prior to visit.        ALLERGIES:    Allergies   Allergen Reactions   • Sulfamethoxazole-Trimethoprim Itching     Abdominal pain as well         Social History     Socioeconomic History   • Marital status:      Spouse name: Amarilys   Tobacco Use   • Smoking status: Former   • Smokeless tobacco: Never   Substance and Sexual Activity   • Alcohol use: No   • Drug use: No   • Sexual activity: Defer        Family History   Problem Relation Age of Onset   • Heart attack Father    • Heart disease Father          at 47   • Hypertension Mother         Still living  [facility] 1924 (96 yrs. Old)        HEMATOLOGY HISTORY:  75-year-old white male who has had abdominal pain for almost 1 month in the right upper quadrant and has been progressively worse and becoming almost epigastric, especially in the morning with an intensity of 7 out of 10. He has occasional nausea but no vomiting. He has sensation of distended abdomen. He has not had excessive  burping or belching and neither flatulence. He has been having normal bowel activity with no passage of blood in the stool. No diarrhea. He has not developed any jaundice. Urination has remained normal. He has had minimal swelling in his lower extremities and he states his feet remain cold all the time. Given the persistency of his symptoms and given the fact that he was seen at an urgent care center at [facility] and advised to be seen by Gastroenterology at [place] and given an appointment for 6 weeks, he decided to come to the emergency room. At the time of the emergency room visit, a CT scan of the abdomen was done that documented extensive thrombosis of the portal vein. The extension of the thrombosis was into the splenic vein into the superior mesenteric vein. The patient was placed immediately on heparin. Since then the symptoms have improved some. He has no nausea or vomiting this morning. His abdomen still remains kind of distended but he has had good bowel activity this morning with no passage of blood in the stool. He has not had any fever, chills or sweats. He denies any cough, sputum production or shortness of breath. He has no bone pain. No joint pain. No rash in the skin. No alopecia. No sores in his mouth and no lesions in the skin otherwise like livedo reticularis. The patient denies any neurological symptoms. He denies any changes in performance status. His weight has remained stable. He is very much indispensable at home. His wife has multiple comorbidities and he is the only one that is able to handle her.    THROMBOPHILIA  CLINICAL AND LABORATORY REPORT:  DATE: 4/ /16 /21     DIAGNOSIS: PYLEPHLEBITIS 3/2021      KEY: P EQUAL TO POSITIVE  , N  EQUAL TO NEGATIVE.    PREPARED BY SENTHIL KELLEY MD STAYS IN THE CHART PERMANENTLY      CLINICAL FACTORS:     OBESITY: ___Y___     DIABETES: __N____.    IMMOBILITY: __N____.    SMOKING HISTORY : ____N________ PACK A DAY/ YEARS ________    LONG TRIPS  BY CAR OR AIRPLANE: ___N_____    RECENT SURGERY: ___N_______   LOCATION: ________    TRAUMA: ____N____    MEDICATIONS:   BIRTH CONTROL MEDICATIONS ____N____ ANDROGENS _N______ ESTROGENS __N______ HEPARIN N________ OTHERS ____N___    PREGNANCY:   ____N____.    FAMILY HISTORY OF BLOOD CLOTHS:    POSITIVE:   ________ NEGATIVE __N_____    HEMATOLOGIC DISORDER:  POLYCYTHEMIA VERA ___N____ THROMBOCYTOSIS _N_____    DIC:___N_____  TTP ___N_______    COLLAGEN VASCULAR DISEASE: __N___   DIAGNOSIS: ________________________.    VASCULITIS: TYPE AND LOCATION ____N___________ BIOPSY PROVEN:____________    CANCER DIAGNOSIS: ___N____    TYPE ________    CANCER SCREENING:  BREAST _______ COLON ___Y_____ LUNG ________ GENITOURINARY ___Y______   CT SCANS _____Y______ NORMAL ______ ABNORMAL ________    INDWELLING VASCULAR DEVICE:  ____N_____. LOCATION _________    INFLAMMATORY BOWEL DISEASE: ULCERATIVE COLITIS ______N____   CHRON'S DISEASE _____N______    RECENT COVID 19 INFECTION ______N_________.          LABORATORY:    FACTOR V LEIDEN MUTATION: POSITIVE   _Y____ NEGATIVE _____ HETEROZYGOUS __Y______ HOMOZYGOUS ________    PROTHROMBIN GENE MUTATION:  POSITIVE _____ NEGATIVE _N____    ANTITHROMBIN III : NORMAL ___N__   ABNORMAL ______ QUANTIFICATION: _______    PROTEIN S ANTIGEN _____N_____ PROTEIN S FUNCTIONAL ___N_____ PROTEIN C _N________     LUPUS ANTICOAGULANT: POSITIVE _____  NEGATIVE _N____. REPEATED  6 WEEKS LATER ____YES POSITIVE__    ANTICARDIOLIPIN ANTIBODY PROFILE : POSITIVE ______  NEGATIVE _N_____  IGG _____ IGM _____ REPEATED 6 WEEKS LATER _________    ANTI GLYCOPROTEIN ANTIBODY:  POSITIVE __Y___  NEGATIVE  _______   IGG _____ IGM _Y/19____ REPEATED 12 WEEKS LATER __Y IT REMAINS POSITIVE______    ANTIPHOSPATIDIL SERINE ANTIBODY:  POSITIVE _____ NEGATIVE _______    SERUM PROTEIN ELECTROPHORESIS AND IMMUNOFIXATION: NO MONOCLONAL PROTEIN __N______ POSITIVE MONOCLONAL PROTEIN ______ TYPE _________    C REACTIVE PROTEIN  "HIGH SENSIBILITY: NORMAL __Y_____ ABNORMAL :QUANTIFICATION ________    SEDIMENTATION RATE: _____ MM/H.    FIBRINOGEN LEVEL: _________ NORMAL _______  ABNORMAL _Y/465_________.    LIPID PROFILE:    CHOLESTEROL HIGH ______Y____  TRYGLYCERIDES HIGH  __Y______    HEPARIN ASSOCIATED ANTIPLATELET ANTIBODY: _NA________        Objective     Vitals:    02/23/23 1058   BP: 119/75   Pulse: 60   Resp: 16   Temp: 97.3 °F (36.3 °C)   TempSrc: Temporal   SpO2: 96%   Weight: 87.3 kg (192 lb 8 oz)   Height: 180.3 cm (70.98\")   PainSc:   2   PainLoc: Chest  Comment: Pt feeling discomfort in lower sternum area     Current Status 2/23/2023   ECOG score 0     Exam:        GENERAL:  Well-developed, well-nourished  Patient  in no acute distress.   SKIN:  Warm, dry ,NO purpura ,no rash.  HEENT:  Pupils were equal and reactive to light and accomodation, conjunctivae noninjected, normal extraocular movements, normal visual acuity.   NECK:  Supple with good range of motion; no thyromegaly , no JVD or bruits,.No carotid artery pain, no carotid abnormal pulsation   LYMPHATICS:  No cervical, NO supraclavicular, NO axillary, NO inguinal adenopathies.  CARDIAC   normal rate , regular rhythm, without murmur,NO rubs NO S3 NO S4   LUNGS: normal breath sounds bilateral, no wheezing, NO rhonchi, NO crackles ,NO rubs.  VASCULAR VENOUS: no cyanosis, NO collateral circulation, NO varicosities, NO edema, NO palpable cords, NO pain,NO erythema, NO pigmentation of the skin.  ABDOMEN:  Soft, NO pain,no hepatomegaly, no splenomegaly,no masses, no ascites, no collateral circulation,no distention.large epigastric hernia  EXTREMITIES  AND SPINE:  No clubbing, no cyanosis ,no deformities , no pain .No kyphosis,  no pain in spine, no pain in ribs , no pain in pelvic bone.  NEUROLOGICAL:  Patient was awake, alert, oriented to time, person and place.          RECENT LABS:  Hematology WBC   Date Value Ref Range Status   02/23/2023 5.95 3.40 - 10.80 10*3/mm3 Final "   05/26/2022 7.5 3.4 - 10.8 x10E3/uL Final     RBC   Date Value Ref Range Status   02/23/2023 4.95 4.14 - 5.80 10*6/mm3 Final   05/26/2022 5.09 4.14 - 5.80 x10E6/uL Final     Hemoglobin   Date Value Ref Range Status   02/23/2023 15.5 13.0 - 17.7 g/dL Final     Hematocrit   Date Value Ref Range Status   02/23/2023 44.1 37.5 - 51.0 % Final     Platelets   Date Value Ref Range Status   02/23/2023 104 (L) 140 - 450 10*3/mm3 Final       CBC:    WBC   Date Value Ref Range Status   02/23/2023 5.95 3.40 - 10.80 10*3/mm3 Final   05/26/2022 7.5 3.4 - 10.8 x10E3/uL Final     RBC   Date Value Ref Range Status   02/23/2023 4.95 4.14 - 5.80 10*6/mm3 Final   05/26/2022 5.09 4.14 - 5.80 x10E6/uL Final     Hemoglobin   Date Value Ref Range Status   02/23/2023 15.5 13.0 - 17.7 g/dL Final     Hematocrit   Date Value Ref Range Status   02/23/2023 44.1 37.5 - 51.0 % Final     MCV   Date Value Ref Range Status   02/23/2023 89.1 79.0 - 97.0 fL Final     MCH   Date Value Ref Range Status   02/23/2023 31.3 26.6 - 33.0 pg Final     MCHC   Date Value Ref Range Status   02/23/2023 35.1 31.5 - 35.7 g/dL Final     RDW   Date Value Ref Range Status   02/23/2023 12.2 (L) 12.3 - 15.4 % Final     RDW-SD   Date Value Ref Range Status   02/23/2023 39.6 37.0 - 54.0 fl Final     MPV   Date Value Ref Range Status   02/23/2023 9.4 6.0 - 12.0 fL Final     Platelets   Date Value Ref Range Status   02/23/2023 104 (L) 140 - 450 10*3/mm3 Final     Neutrophil %   Date Value Ref Range Status   02/23/2023 65.4 42.7 - 76.0 % Final     Lymphocyte %   Date Value Ref Range Status   02/23/2023 20.2 19.6 - 45.3 % Final     Monocyte %   Date Value Ref Range Status   02/23/2023 9.6 5.0 - 12.0 % Final     Eosinophil %   Date Value Ref Range Status   02/23/2023 3.7 0.3 - 6.2 % Final     Basophil %   Date Value Ref Range Status   02/23/2023 0.8 0.0 - 1.5 % Final     Immature Grans %   Date Value Ref Range Status   02/23/2023 0.3 0.0 - 0.5 % Final     Neutrophils, Absolute    Date Value Ref Range Status   02/23/2023 3.89 1.70 - 7.00 10*3/mm3 Final     Lymphocytes, Absolute   Date Value Ref Range Status   02/23/2023 1.20 0.70 - 3.10 10*3/mm3 Final     Monocytes, Absolute   Date Value Ref Range Status   02/23/2023 0.57 0.10 - 0.90 10*3/mm3 Final     Eosinophils, Absolute   Date Value Ref Range Status   02/23/2023 0.22 0.00 - 0.40 10*3/mm3 Final     Basophils, Absolute   Date Value Ref Range Status   02/23/2023 0.05 0.00 - 0.20 10*3/mm3 Final     Immature Grans, Absolute   Date Value Ref Range Status   02/23/2023 0.02 0.00 - 0.05 10*3/mm3 Final     nRBC   Date Value Ref Range Status   02/23/2023 0.0 0.0 - 0.2 /100 WBC Final        CMP:    Glucose   Date Value Ref Range Status   02/23/2023 105 74 - 124 mg/dL Final     BUN   Date Value Ref Range Status   02/23/2023 19 6 - 20 mg/dL Final     Creatinine   Date Value Ref Range Status   02/23/2023 1.46 (H) 0.70 - 1.30 mg/dL Final   10/06/2021 1.30 0.60 - 1.30 mg/dL Final     Comment:     Serial Number: [id number]:  [id number]     Sodium   Date Value Ref Range Status   02/23/2023 141 134 - 145 mmol/L Final     Potassium   Date Value Ref Range Status   02/23/2023 4.5 3.5 - 4.7 mmol/L Final     Chloride   Date Value Ref Range Status   02/23/2023 108 (H) 98 - 107 mmol/L Final     CO2   Date Value Ref Range Status   02/23/2023 21.5 (L) 22.0 - 29.0 mmol/L Final     Calcium   Date Value Ref Range Status   02/23/2023 10.1 8.5 - 10.2 mg/dL Final     Total Protein   Date Value Ref Range Status   02/23/2023 6.3 6.3 - 8.0 g/dL Final     Albumin   Date Value Ref Range Status   02/23/2023 4.1 3.5 - 5.2 g/dL Final     ALT (SGPT)   Date Value Ref Range Status   02/23/2023 12 0 - 41 U/L Final     AST (SGOT)   Date Value Ref Range Status   02/23/2023 15 0 - 40 U/L Final     Alkaline Phosphatase   Date Value Ref Range Status   02/23/2023 66 38 - 116 U/L Final     Total Bilirubin   Date Value Ref Range Status   02/23/2023 0.9 0.2 - 1.2 mg/dL Final     eGFR African  Am   Date Value Ref Range Status   11/22/2021 59 (L) >59 mL/min/1.73 Final     Comment:     **In accordance with recommendations from the NKF-ASN Task force,**    [facility] is in the process of updating its eGFR calculation to the    2021 CKD-EPI creatinine equation that estimates kidney function    without a race variable.       Globulin   Date Value Ref Range Status   02/23/2023 2.2 1.8 - 3.5 gm/dL Final     A/G Ratio   Date Value Ref Range Status   02/23/2023 1.9 1.1 - 2.4 g/dL Final     BUN/Creatinine Ratio   Date Value Ref Range Status   02/23/2023 13.0 7.3 - 30.0 Final     Anion Gap   Date Value Ref Range Status   02/23/2023 11.5 5.0 - 15.0 mmol/L Final                    Assessment & Plan     Diagnoses and all orders for this visit:    1. Factor 5 Leiden mutation, heterozygous (HCC) (Primary)  -     CBC & Differential; Future  -     Comprehensive Metabolic Panel; Future    2. Portal vein thrombosis  -     CBC & Differential; Future  -     Comprehensive Metabolic Panel; Future    3. Hypercoagulable state (HCC)  -     CBC & Differential; Future  -     Comprehensive Metabolic Panel; Future    4. Current use of long term anticoagulation  -     CBC & Differential; Future  -     Comprehensive Metabolic Panel; Future      1.   Portal vein thrombosis diagnosed March 2021:  · Present to ER with abdominal pain, diagnosed pylephlebitis and he was placed on heparin and subsequently Eliquis and discharged home. Since discharge his abdominal pain has almost substantially improved to the point of resolution and he has no abdominal distention.   · heterozygous for factor V Leiden mutation and he has positivity for anti-glycoprotein antibodies. The rest of the thrombophilia workup stated above is negative.   · eviewed on 06/07/2021. He has not had any new manifestations of his portal vein thrombosis in fact no symptoms at all. A CT scan that I have personally reviewed has documented cavernous transformation of the portal vein  and recanalization as well as multiple collateral circulation formation. That means that the flow and the venous return from the growth into the liver is appropriately directed. The patient has not had any new thrombosis. His antiglycoprotein antibody remains positive 44 in comparison with previous assessment. He has also a lupus anticoagulant positive.   · I pointed out to the patient that given the circumstances and his factor V Leiden mutation heterozygosity he is to remain on anticoagulation for the rest of his life.   The patient was reviewed on 12/01/2021. He has not had any further GI symptomatology. The Prilosec has made a big difference in regard to his issues. He has no abdominal distention. His physical exam disclosed no abdominal wall collateral circulation but radiologically his CT scan posted above documents abundant collaterals surrounding the portal system. There are no alterations in the pancreas, gallbladder, liver, spleen, kidney anatomies. Aorta with minimal atherosclerosis, no retroperitoneal adenopathy, no ascites.     I advised him to remain on the Eliquis obviously staying away from trauma.   On 06/22/2022, the patient was reviewed in regard to his history of thrombophilia. He remains on Eliquis. No clinical bleeding. No new thrombosis. No abdominal symptomatology, no modification in the size of his abdomen, no liver enlargement, no obvious ascites, no obvious collateral circulation in the abdominal wall. He was advised again about prevention of trauma. He will return to see us in 6 months with a CBC and a CMP.  On 02/23/2023 the patient has no symptoms or signs of pylephlebitis. He has no collateral circulation in his abdominal wall. He has no ascites or pain. No liver enlargement or splenomegaly. He remains on Eliquis and he will remain on this medicine for the time being. He has not developed any clinical bleeding and the hemoglobin, white count and platelet count remain normal. He will be  reviewed again in 6 months or a year.        ·   2.  Abdominal pain:   Seen 9/28/2021 for triage visit due to recent intermittent abdominal pain, epigastric and radiating to the back.  He denies any other associated pain or symptoms.  He does not feel this is reflux he does not have the classic burning up the esophagus.  He however occasionally has a raspy cough.  He has not tried any over-the-counter Prilosec or Pepcid recently.  He states in the past he did have issues with this but not recently.  He denies nausea, edema, shortness of breath, or pain with deep breath.  He states he is not missed any of his Eliquis dosing.  This is reviewed with Dr. Magdaleno today and we will proceed with a CT abdomen pelvis for evaluation due to his portal vein thrombosis.  We will also start the patient on Prilosec 20 mg daily for 1 month to see if this helps his symptoms.  CMP was performed today which was unremarkable.   · Patient was seen via telehealth visit on 10/12/2021 to review CT scans.  Prior to the visit I had reviewed and discussed his CT scans with Dr. Magdaleno who reviewed the images and felt that his CT scan was stable.  Discussed with the patient he is to remain on anticoagulation with Eliquis 5 mg every 12 hours for the remainder of his life.  We did discuss trying to space the dosing about every 12 hours.  We discussed trying Pepcid complete to see if this would help relieve his reflux symptoms as needed.  Call with worsening abdominal pain or other concerns.  We discussed eating smaller more frequent meals.  The Prilosec has made a big difference in regard to his abdominal symptomatology and he will remain on the present dose 20 mg a day for the rest of the year. He will have a new prescription sent to [facility] in this regard as well. Otherwise no other new issues for him. I advised him to stay away from trauma and minimize any potentiality for falls during winter months, frozen steps, ice and snow.  On  06/22/2022, he has no abdominal pain since proton pump inhibitor was initiated. The patient was advised to remain on this medicine. He no longer had any abdominal pain or discomfort.  On 02/23/2023 no evidence of abdominal pain. We documented clinically a large epigastric hernia. This per se has no need for any intervention from my point of view.      3.Regarding thrombocytopenia, the patient’s platelet count is around 110,000. No palpable splenomegaly. Radiologically the patient has no obvious splenomegaly but the thrombosis of the portal vein makes suggestion that he could have some sequestration of platelets by the spleen. He has not been taking any medicine that would trigger thrombocytopenia and he has no other comorbidity that would lead into thrombocytopenia. An IPF is pending today. We will watch this in absence of any other intervention.  On 02/23/2023 his platelet count actually is very good today into the normal limits at 104,000. He will remain in observation from this point of view. It is very likely that an element of passive congestion and splenomegaly associated with portal vein thrombosis triggered sequestration of platelets that has no implications for him at this point.    ·   4.On 06/22/2022, the patient was reviewed because he called the office about dysphagia. In the way how his symptoms are for the last 6 weeks or so, he has oropharyngeal dysphagia and raises the question about cricopharyngeal muscle issues. His oropharynx is normal on the clinical examination today. His neck is normal is the clinical examination today. He is not having any episodes of aspiration.     I advised him to use 2 Altoids before he swallows any food at least 10 minutes before to see if that helps out. Recent data supports this. Also I advised him to do barium swallowing with speech pathologist. He was going to have an upper endoscopy by Dr. Alvarado. This was postponed until this 09/2022 and he does not want to wait  for so long until this is rescheduled. His wife has been diagnosed with dementia and that is why he had to postpone the esophagoscopic procedure.     We will go ahead and schedule the barium swallowing esophagram and we will call him with the report of this. We will share this information with Dr. Alvarado. Otherwise, he had no other issues.     I summarized each one of the points as stated above and that is the plan of care.    On 02/23/2023 I reviewed with the patient his dysphagia workup that documented a normal barium swallow, a normal endoscopy and no obvious anatomical or physiological alteration. Given the symptoms I still believe that he has some asynchrony in the muscle activity of the esophagus that triggers the problem. One thing that he could do is try to have a couple of peppermint candies before he has his meals to see if that loosens up the sphincter and allows things to go through. Otherwise, I have no other suggestions for him at this point.     Given the tremendous stressors in his life including illness in his wife and illness in his mother that will require placement in a nursing facility the patient wants to postpone any other appointments with me until he returns back to see us in a year. As long as he continues with his anticoagulant and careful in regard to trauma avoidance it would be perfectly fine. He will have a CBC and a CMP at that time.    ·       Danie Magadleno MD

## 2023-03-02 ENCOUNTER — TELEPHONE (OUTPATIENT)
Dept: INTERNAL MEDICINE | Facility: CLINIC | Age: 78
End: 2023-03-02
Payer: MEDICARE

## 2023-03-02 NOTE — TELEPHONE ENCOUNTER
Pt called and said you told him to call if this didn't work, it's making him nauseated and he would like you to call him

## 2023-03-02 NOTE — TELEPHONE ENCOUNTER
Caller: Timi Wolf    Relationship: Self    Best call back number: [phone]    What is the best time to reach you: ANY    Who are you requesting to speak with (clinical staff, provider,  specific staff member): CLINICAL STAFF    What was the call regarding: PATIENT REPORTS SIDE EFFECTS FROM LEXAPRO, WOULD LIKE TO DISCUSS CHANGING MEDICATIONS    Do you require a callback: YES

## 2023-03-04 RX ORDER — PAROXETINE 10 MG/1
10 TABLET, FILM COATED ORAL EVERY MORNING
Qty: 30 TABLET | Refills: 2 | Status: SHIPPED | OUTPATIENT
Start: 2023-03-04 | End: 2023-03-30

## 2023-03-14 DIAGNOSIS — E78.2 MIXED HYPERLIPIDEMIA: ICD-10-CM

## 2023-03-14 DIAGNOSIS — I10 ESSENTIAL HYPERTENSION: ICD-10-CM

## 2023-03-14 RX ORDER — PRAVASTATIN SODIUM 80 MG/1
80 TABLET ORAL NIGHTLY
Qty: 90 TABLET | Refills: 1 | Status: SHIPPED | OUTPATIENT
Start: 2023-03-14

## 2023-03-14 RX ORDER — APIXABAN 5 MG/1
TABLET, FILM COATED ORAL
Qty: 180 TABLET | Refills: 3 | Status: SHIPPED | OUTPATIENT
Start: 2023-03-14

## 2023-03-14 RX ORDER — AMLODIPINE BESYLATE AND BENAZEPRIL HYDROCHLORIDE 5; 20 MG/1; MG/1
1 CAPSULE ORAL DAILY
Qty: 90 CAPSULE | Refills: 1 | Status: SHIPPED | OUTPATIENT
Start: 2023-03-14

## 2023-03-14 NOTE — TELEPHONE ENCOUNTER
Caller: Timi Wolf    Relationship: Self    Best call back number: [phone]    Requested Prescriptions:   Requested Prescriptions     Pending Prescriptions Disp Refills   • amLODIPine-benazepril (LOTREL 5-20) 5-20 MG per capsule 90 capsule 1     Sig: Take 1 capsule by mouth Daily.   • pravastatin (PRAVACHOL) 80 MG tablet 90 tablet 1     Sig: Take 1 tablet by mouth Every Night.        Pharmacy where request should be sent: [facility] ([facility] ) - [address] [facility] [phone] [facility] [phone] FX     Does the patient have less than a 3 day supply:  [] Yes  [x] No    Would you like a call back once the refill request has been completed: [x] Yes [] No    If the office needs to give you a call back, can they leave a voicemail: [x] Yes [] No    Victoriano Alcazar Rep   03/14/23 09:14 EDT

## 2023-03-30 ENCOUNTER — OFFICE VISIT (OUTPATIENT)
Dept: GASTROENTEROLOGY | Facility: CLINIC | Age: 78
End: 2023-03-30
Payer: MEDICARE

## 2023-03-30 VITALS — BODY MASS INDEX: 27.58 KG/M2 | TEMPERATURE: 97.9 F | WEIGHT: 197 LBS | HEIGHT: 71 IN

## 2023-03-30 DIAGNOSIS — K21.9 GASTROESOPHAGEAL REFLUX DISEASE, UNSPECIFIED WHETHER ESOPHAGITIS PRESENT: Primary | ICD-10-CM

## 2023-03-30 DIAGNOSIS — R13.10 DYSPHAGIA, UNSPECIFIED TYPE: ICD-10-CM

## 2023-03-30 PROCEDURE — 1160F RVW MEDS BY RX/DR IN RCRD: CPT | Performed by: NURSE PRACTITIONER

## 2023-03-30 PROCEDURE — 99214 OFFICE O/P EST MOD 30 MIN: CPT | Performed by: NURSE PRACTITIONER

## 2023-03-30 PROCEDURE — 1159F MED LIST DOCD IN RCRD: CPT | Performed by: NURSE PRACTITIONER

## 2023-03-30 RX ORDER — PANTOPRAZOLE SODIUM 40 MG/1
40 TABLET, DELAYED RELEASE ORAL DAILY
Qty: 90 TABLET | Refills: 3 | Status: SHIPPED | OUTPATIENT
Start: 2023-03-30

## 2023-03-30 NOTE — PROGRESS NOTES
Chief Complaint   Patient presents with   • Heartburn   • Difficulty Swallowing       HPI    Timi Wolf is a  77 y.o. male here for a follow up visit for heartburn.    This patient follows with Dr. Alvarado and myself.    Reviewed EGD 9/29/2022 -  small hiatal hernia otherwise normal no abnormality to explain dysphagia empiric dilation performed.  Pathology was benign.    On visit today he reports no change in dysphagia symptoms with PPI therapy nor empiric dilation.  He has been taking Protonix twice daily.  Prior to EGD he underwent normal esophagram and normal video swallowing study.  Symptoms come and go approximately twice a month.  He does not seem overly concerned about symptoms.  He is not very interested in undergoing more testing either.  He has been directed to try deltoids with symptoms by his hematologist but has not done so.  He denies nausea, vomiting, odynophagia, poor appetite, or weight loss.    No lower GI complaints    Patient reports normal colonoscopy in 2015.  He reports negative Cologuard 2 year ago.     Past Medical History:   Diagnosis Date   • Abdominal pain    • Arteriosclerotic cardiovascular disease    • Benign prostatic hyperplasia    • Cataract    • Diverticulosis    • Dysphagia    • GERD (gastroesophageal reflux disease)    • H/O Interstitial cystitis    • HL (hearing loss)    • Hyperlipidemia    • Hypertension    • Internal hemorrhoids    • Prostate enlargement    • Pulmonary embolism (HCC)    • Renal cyst, right    • Umbilical hernia        Past Surgical History:   Procedure Laterality Date   • ACHILLES TENDON REPAIR      ruptured tendon   • COLONOSCOPY N/A 2012    Dr Alvarado/[facility]   • ENDOSCOPY     • ENDOSCOPY N/A 09/29/2022    Procedure: ESOPHAGOGASTRODUODENOSCOPY with biopsy, wilson dilation;  Surgeon: Noel Alvarado MD;  Location: [facility] ENDOSCOPY;  Service: Gastroenterology;  Laterality: N/A;  hiatal hernia, gastritis   • SHOULDER SURGERY     • TONSILLECTOMY     • UPPER  GASTROINTESTINAL ENDOSCOPY         Scheduled Meds:     Continuous Infusions: No current facility-administered medications for this visit.      PRN Meds:     Allergies   Allergen Reactions   • Sulfamethoxazole-Trimethoprim Itching     Abdominal pain as well        Social History     Socioeconomic History   • Marital status:      Spouse name: Amarilys   Tobacco Use   • Smoking status: Former     Packs/day: 1.00     Types: Cigarettes   • Smokeless tobacco: Never   • Tobacco comments:     Quit over 30 years ago   Substance and Sexual Activity   • Alcohol use: No   • Drug use: No   • Sexual activity: Defer       Family History   Problem Relation Age of Onset   • Heart attack Father    • Heart disease Father          at 47   • Hypertension Mother         Still living   1926 (96 yrs. Old)       Review of Systems   Constitutional: Negative for activity change, appetite change, fatigue, fever and unexpected weight change.   HENT: Positive for trouble swallowing.    Respiratory: Negative for apnea, cough, choking, chest tightness, shortness of breath and wheezing.    Cardiovascular: Negative for chest pain, palpitations and leg swelling.   Gastrointestinal: Negative for abdominal distention, abdominal pain, anal bleeding, blood in stool, constipation, diarrhea, nausea, rectal pain and vomiting.       Vitals:    23 1000   Temp: 97.9 °F (36.6 °C)       Physical Exam  Constitutional:       Appearance: He is well-developed.   Abdominal:      General: Bowel sounds are normal. There is no distension.      Palpations: Abdomen is soft. There is no mass.      Tenderness: There is no abdominal tenderness. There is no guarding.      Hernia: No hernia is present.   Skin:     General: Skin is warm and dry.      Capillary Refill: Capillary refill takes less than 2 seconds.   Neurological:      Mental Status: He is alert and oriented to person, place, and time.   Psychiatric:         Behavior: Behavior normal.      Assessment    Diagnoses and all orders for this visit:    1. Gastroesophageal reflux disease, unspecified whether esophagitis present (Primary)    2. Dysphagia, unspecified type    Other orders  -     pantoprazole (PROTONIX) 40 MG EC tablet; Take 1 tablet by mouth Daily.  Dispense: 90 tablet; Refill: 3       Plan    Domitila 77-year-old male seen today in follow-up status post EGD prompted for symptoms of dysphagia with normal findings however empiric dilation performed.  Patient reports feeling the same as before procedure and twice daily dosing PPI therapy.  Symptoms come and go once or twice a month.  We discussed possibility of a manometry but he does not want to have further work-up at this time.  He prefers to try peppermint altoids prior to meals when symptoms occur which would treat potential esophageal spasms.  We also discussed reducing down to once daily PPI and monitoring for symptoms and he is agreeable.  He is to call the office if needed otherwise we will see him back 1 year         SPARKLE Lemus  [facility], [place]  [address] [place] [address]  Office: [phone]    I spent 30 minutes caring for Timi on this date of service. This time includes time spent by me in the following activities: preparing for the visit, reviewing tests, obtaining and/or reviewing a separately obtained history, performing a medically appropriate examination and/or evaluation , counseling and educating the patient/family/caregiver, ordering medications, tests, or procedures, documenting information in the medical record, independently interpreting results and communicating that information with the patient/family/caregiver and care coordination.

## 2023-04-04 ENCOUNTER — OFFICE VISIT (OUTPATIENT)
Dept: INTERNAL MEDICINE | Facility: CLINIC | Age: 78
End: 2023-04-04
Payer: MEDICARE

## 2023-04-04 VITALS
WEIGHT: 194 LBS | OXYGEN SATURATION: 98 % | HEART RATE: 53 BPM | BODY MASS INDEX: 27.06 KG/M2 | DIASTOLIC BLOOD PRESSURE: 84 MMHG | SYSTOLIC BLOOD PRESSURE: 138 MMHG

## 2023-04-04 DIAGNOSIS — F32.9 REACTIVE DEPRESSION: Primary | ICD-10-CM

## 2023-04-04 DIAGNOSIS — N18.31 STAGE 3A CHRONIC KIDNEY DISEASE: ICD-10-CM

## 2023-04-04 DIAGNOSIS — I10 PRIMARY HYPERTENSION: ICD-10-CM

## 2023-04-04 DIAGNOSIS — D68.51 FACTOR 5 LEIDEN MUTATION, HETEROZYGOUS: ICD-10-CM

## 2023-04-04 DIAGNOSIS — I81 PORTAL VEIN THROMBOSIS: ICD-10-CM

## 2023-04-04 DIAGNOSIS — I10 ESSENTIAL HYPERTENSION: ICD-10-CM

## 2023-04-04 DIAGNOSIS — Z79.01 CURRENT USE OF LONG TERM ANTICOAGULATION: ICD-10-CM

## 2023-04-04 RX ORDER — ESCITALOPRAM OXALATE 10 MG/1
10 TABLET ORAL DAILY
Qty: 90 TABLET | Refills: 1 | Status: SHIPPED | OUTPATIENT
Start: 2023-04-04

## 2023-04-04 NOTE — ASSESSMENT & PLAN NOTE
Patient's depression is single episode and is moderate without psychosis. Their depression is currently active and the condition is improving with treatment. This will be reassessed at the next regular appointment. F/U as described:patient was prescribed an antidepressant medicine.  Malinda still has a pretty severe reactive depression with no suicidal features.  He has a lot of caregiving responsibilities including his mother in the nursing home and the financial burden that his mother has for that as well as maneuvering the bureaucracy of the nursing home.    His wife has medical issues as well and has mobility issues also.  The Lexapro 5 mg works somewhat during the daytime it seems to run out at night.  We will have him increase his Lexapro to 5 mg twice daily until we get the new dose from mail-order at 10 mg daily.  I will look for extended release Lexapro but I am not aware of an extended release at this time.  If I find that there manufacturing extended release I gladly refill that 1 as well.

## 2023-04-04 NOTE — PROGRESS NOTES
"Chief Complaint  Hypertension, Hyperlipidemia, and Chronic Kidney Disease    Subjective        Timi Wolf presents to [facility]  History of Present Illness  This will be reassessed at the next regular appointment. F/U as described:patient was prescribed an antidepressant medicine.  Malinda still has a pretty severe reactive depression with no suicidal features.  He has a lot of caregiving responsibilities including his mother in the nursing home and the financial burden that his mother has for that as well as maneuvering the bureaucracy of the nursing home.    His wife has medical issues as well and has mobility issues also.  The Lexapro 5 mg works somewhat during the daytime it seems to run out at night.  We will have him increase his Lexapro to 5 mg twice daily until we get the new dose from mail-order at 10 mg daily.  I will look for extended release Lexapro but I am not aware of an extended release at this time.  If I find that there manufacturing extended release I gladly refill that 1 as well.      Otherwise he has portal vein thrombosis and followed by Dr. Arndt on chronic Eliquis 5 mg twice daily.  His platelets are borderline normal.  He has a slight rise in creatinine with normal BUN.  We will monitor this for now and does not appear to be causing any clinical problems.  Consider rechecking labs nonfasting in the future.  Hypertension  This is a chronic problem. The problem is controlled.   Hyperlipidemia    Chronic Kidney Disease        Objective   Vital Signs:  /84 (BP Location: Left arm, Patient Position: Sitting, Cuff Size: Adult)   Pulse 53   Wt 88 kg (194 lb)   SpO2 98%   BMI 27.06 kg/m²   Estimated body mass index is 27.06 kg/m² as calculated from the following:    Height as of 3/30/23: 180.3 cm (71\").    Weight as of this encounter: 88 kg (194 lb).             Physical Exam  Vitals reviewed.   Constitutional:       Appearance: He is well-developed. "   HENT:      Head: Normocephalic and atraumatic.      Right Ear: Tympanic membrane and external ear normal.      Left Ear: Tympanic membrane and external ear normal.      Nose: Nose normal.   Eyes:      Conjunctiva/sclera: Conjunctivae normal.      Pupils: Pupils are equal, round, and reactive to light.   Neck:      Thyroid: No thyromegaly.      Vascular: No JVD.   Cardiovascular:      Rate and Rhythm: Normal rate and regular rhythm.      Heart sounds: Normal heart sounds.   Pulmonary:      Effort: Pulmonary effort is normal.      Breath sounds: Normal breath sounds.   Abdominal:      General: Bowel sounds are normal.      Palpations: Abdomen is soft.   Musculoskeletal:         General: Normal range of motion.      Cervical back: Normal range of motion and neck supple.   Lymphadenopathy:      Cervical: No cervical adenopathy.   Skin:     General: Skin is warm and dry.      Findings: No rash.   Neurological:      Mental Status: He is alert and oriented to person, place, and time.      Cranial Nerves: No cranial nerve deficit.      Coordination: Coordination normal.   Psychiatric:         Attention and Perception: Attention normal.         Mood and Affect: Mood is depressed.         Speech: Speech normal.         Behavior: Behavior normal.         Thought Content: Thought content normal. Thought content does not include suicidal ideation. Thought content does not include suicidal plan.         Cognition and Memory: Cognition normal.         Judgment: Judgment normal.        Result Review :                   Assessment and Plan   Diagnoses and all orders for this visit:    1. Reactive depression (Primary)  Assessment & Plan:  Patient's depression is single episode and is moderate without psychosis. Their depression is currently active and the condition is improving with treatment. This will be reassessed at the next regular appointment. F/U as described:patient was prescribed an antidepressant medicine.  Malinda newton  has a pretty severe reactive depression with no suicidal features.  He has a lot of caregiving responsibilities including his mother in the nursing home and the financial burden that his mother has for that as well as maneuvering the bureaucracy of the nursing home.    His wife has medical issues as well and has mobility issues also.  The Lexapro 5 mg works somewhat during the daytime it seems to run out at night.  We will have him increase his Lexapro to 5 mg twice daily until we get the new dose from mail-order at 10 mg daily.  I will look for extended release Lexapro but I am not aware of an extended release at this time.  If I find that there manufacturing extended release I gladly refill that 1 as well.      2. Essential hypertension    3. Portal vein thrombosis    4. Factor 5 Leiden mutation, heterozygous    5. Current use of long term anticoagulation    6. Stage 3a chronic kidney disease  Assessment & Plan:  Renal condition is unchanged.  Continue current treatment regimen.  Renal condition will be reassessed in 3 months.      7. Primary hypertension  Assessment & Plan:  Hypertension is improving with treatment.  Continue current treatment regimen.  Blood pressure will be reassessed at the next regular appointment.      Other orders  -     escitalopram (Lexapro) 10 MG tablet; Take 1 tablet by mouth Daily.  Dispense: 90 tablet; Refill: 1           Follow Up   No follow-ups on file.  Patient was given instructions and counseling regarding his condition or for health maintenance advice. Please see specific information pulled into the AVS if appropriate.       Answers for HPI/ROS submitted by the patient on 4/3/2023  What is the primary reason for your visit?: Other  Please describe your symptoms.: Depression  Have you had these symptoms before?: Yes  How long have you been having these symptoms?: Greater than 2 weeks  Please list any medications you are currently taking for this condition.: Lexapro 5mg  daily

## 2023-05-05 ENCOUNTER — TELEPHONE (OUTPATIENT)
Dept: INTERNAL MEDICINE | Facility: CLINIC | Age: 78
End: 2023-05-05
Payer: MEDICARE

## 2023-05-05 NOTE — TELEPHONE ENCOUNTER
Caller: Timi Wolf    Relationship: Self    Best call back number:     What is the best time to reach you:     Who are you requesting to speak with (clinical staff, provider,  specific staff member):    Do you know the name of the person who called:    What was the call regarding: PATIENT SAYS THAT HE HAD LABS WITH HIS HEMATOLOGIST LAST MONTH AND HE SAYS HE RECEIVED A MESSAGE FROM DR DOCKERY THAT HE WOULD NEED TO HAVE LABS DONE.  HE WANTS TO KNOW IF THE LABS FROM THE HEMATOLOGIST CAN BE USED.     Do you require a callback: YES

## 2023-08-02 DIAGNOSIS — I10 ESSENTIAL HYPERTENSION: ICD-10-CM

## 2023-08-02 RX ORDER — AMLODIPINE BESYLATE AND BENAZEPRIL HYDROCHLORIDE 5; 20 MG/1; MG/1
1 CAPSULE ORAL DAILY
Qty: 90 CAPSULE | Refills: 3 | Status: SHIPPED | OUTPATIENT
Start: 2023-08-02

## 2023-08-04 DIAGNOSIS — I10 ESSENTIAL HYPERTENSION: ICD-10-CM

## 2023-08-04 DIAGNOSIS — N40.0 PROSTATE ENLARGEMENT: ICD-10-CM

## 2023-08-04 RX ORDER — METOPROLOL SUCCINATE 50 MG/1
50 TABLET, EXTENDED RELEASE ORAL DAILY
Qty: 90 TABLET | Refills: 3 | Status: SHIPPED | OUTPATIENT
Start: 2023-08-04

## 2023-08-04 RX ORDER — TAMSULOSIN HYDROCHLORIDE 0.4 MG/1
1 CAPSULE ORAL DAILY
Qty: 90 CAPSULE | Refills: 3 | Status: SHIPPED | OUTPATIENT
Start: 2023-08-04

## 2023-08-18 RX ORDER — ESCITALOPRAM OXALATE 10 MG/1
10 TABLET ORAL DAILY
Qty: 90 TABLET | Refills: 3 | Status: SHIPPED | OUTPATIENT
Start: 2023-08-18

## 2023-08-20 DIAGNOSIS — E78.2 MIXED HYPERLIPIDEMIA: ICD-10-CM

## 2023-08-21 RX ORDER — PRAVASTATIN SODIUM 80 MG/1
TABLET ORAL
Qty: 90 TABLET | Refills: 3 | Status: SHIPPED | OUTPATIENT
Start: 2023-08-21

## 2023-08-21 NOTE — TELEPHONE ENCOUNTER
Rx Refill Note  Requested Prescriptions     Pending Prescriptions Disp Refills    pravastatin (PRAVACHOL) 80 MG tablet [Pharmacy Med Name: Pravastatin Sodium 80 MG Oral Tablet] 90 tablet 3     Sig: TAKE 1 TABLET BY MOUTH AT NIGHT      Last office visit with prescribing clinician: 4/4/2023   Last telemedicine visit with prescribing clinician: Visit date not found   Next office visit with prescribing clinician: Visit date not found       Vanessa Mckeon MA  08/21/23, 14:42 EDT

## 2023-12-15 ENCOUNTER — TELEPHONE (OUTPATIENT)
Dept: ONCOLOGY | Facility: CLINIC | Age: 78
End: 2023-12-15
Payer: MEDICARE

## 2023-12-15 NOTE — TELEPHONE ENCOUNTER
Provider: Rasta  Caller: patient  Relationship to Patient: self  Call Back Phone Number: [phone]  Reason for Call: Pt calling about getting in sooner for an appt?He has coughing and sharp pain in his chest.

## 2023-12-15 NOTE — TELEPHONE ENCOUNTER
Called the patient back and he stated that he wanted to see Dr. Magdaleno sooner than March because he was diagnosed with bronchitis yesterday. He states he has right upper chest pain and he was started on azithromycin, prednisone, and tesslon pearls. He has only had one days worth of all. He stated that he was worried because his wife got diagnosed with a PE. I talked with Dr. Magdaleno and then let him know we understand the high anxiety r/t the wifes diagnosis but we think he needs to be on the medications prescribed and call us Monday if he was not better and we would order a chest xray. He v/u.

## 2023-12-20 ENCOUNTER — OFFICE VISIT (OUTPATIENT)
Dept: INTERNAL MEDICINE | Facility: CLINIC | Age: 78
End: 2023-12-20
Payer: MEDICARE

## 2023-12-20 ENCOUNTER — HOSPITAL ENCOUNTER (OUTPATIENT)
Facility: HOSPITAL | Age: 78
Discharge: HOME | End: 2023-12-20
Payer: MEDICARE

## 2023-12-20 VITALS
HEIGHT: 71 IN | HEART RATE: 89 BPM | OXYGEN SATURATION: 98 % | BODY MASS INDEX: 27.72 KG/M2 | SYSTOLIC BLOOD PRESSURE: 160 MMHG | WEIGHT: 198 LBS | DIASTOLIC BLOOD PRESSURE: 90 MMHG

## 2023-12-20 DIAGNOSIS — R06.2 WHEEZING: ICD-10-CM

## 2023-12-20 DIAGNOSIS — J06.9 VIRAL UPPER RESPIRATORY TRACT INFECTION: ICD-10-CM

## 2023-12-20 DIAGNOSIS — H66.90 ACUTE OTITIS MEDIA, UNSPECIFIED OTITIS MEDIA TYPE: Primary | ICD-10-CM

## 2023-12-20 PROCEDURE — 99213 OFFICE O/P EST LOW 20 MIN: CPT

## 2023-12-20 PROCEDURE — 71046 X-RAY EXAM CHEST 2 VIEWS: CPT

## 2023-12-20 PROCEDURE — 3080F DIAST BP >= 90 MM HG: CPT

## 2023-12-20 PROCEDURE — 3077F SYST BP >= 140 MM HG: CPT

## 2023-12-20 PROCEDURE — 1159F MED LIST DOCD IN RCRD: CPT

## 2023-12-20 PROCEDURE — 1160F RVW MEDS BY RX/DR IN RCRD: CPT

## 2023-12-20 RX ORDER — AMOXICILLIN AND CLAVULANATE POTASSIUM 875; 125 MG/1; MG/1
1 TABLET, FILM COATED ORAL 2 TIMES DAILY
Qty: 14 TABLET | Refills: 0 | Status: SHIPPED | OUTPATIENT
Start: 2023-12-20 | End: 2023-12-27

## 2023-12-20 RX ORDER — ALBUTEROL SULFATE 90 UG/1
2 AEROSOL, METERED RESPIRATORY (INHALATION) EVERY 4 HOURS PRN
Qty: 8 G | Refills: 1 | Status: SHIPPED | OUTPATIENT
Start: 2023-12-20

## 2023-12-20 RX ORDER — DEXTROMETHORPHAN HYDROBROMIDE AND PROMETHAZINE HYDROCHLORIDE 15; 6.25 MG/5ML; MG/5ML
5 SYRUP ORAL 4 TIMES DAILY PRN
Qty: 180 ML | Refills: 1 | Status: SHIPPED | OUTPATIENT
Start: 2023-12-20

## 2023-12-20 NOTE — PATIENT INSTRUCTIONS
Take antibiotic as prescribed. Recommend cough syrup at night. Take 2 puffs of inhaler every 4 hours as needed for shortness of breath or wheezing. Chest x-ray today. Stay hydrated with water. Follow-up as needed.       
Adequate: hears normal conversation without difficulty

## 2023-12-20 NOTE — PROGRESS NOTES
"Chief Complaint  Nasal Congestion, Cough, and Fatigue (Tested negative for Covid last Friday )    Subjective        Timi Wolf presents to [facility]  History of Present Illness  78-year-old male presenting with cough and congestion.  Patient Dr. Ruiz.  Has had a nagging cough for the past 10 days.  Went to urgent care on 12/14 and diagnosed with bronchitis.  Completed azithromycin, prednisone and Tessalon Perles.  States Tessalon Perles did not help at all.  Symptoms have not improved.  Initially had a fever but has not since the first day.  Has been coughing up productive phlegm.  Cough keeps him up at night.  Has been wheezing occasionally.  Treating with Robitussin, NyQuil and Tylenol.  Cough    Fatigue  Associated symptoms include coughing and fatigue.       Objective   Vital Signs:  /90 (BP Location: Right arm, Patient Position: Sitting, Cuff Size: Adult)   Pulse 89   Ht 180.3 cm (71\")   Wt 89.8 kg (198 lb)   SpO2 98%   BMI 27.62 kg/m²   Estimated body mass index is 27.62 kg/m² as calculated from the following:    Height as of this encounter: 180.3 cm (71\").    Weight as of this encounter: 89.8 kg (198 lb).               Physical Exam  Vitals reviewed.   Constitutional:       Appearance: Normal appearance.   HENT:      Right Ear: Tympanic membrane is bulging.      Left Ear: Tympanic membrane is bulging.      Nose: Congestion present.      Mouth/Throat:      Pharynx: Posterior oropharyngeal erythema present.   Cardiovascular:      Rate and Rhythm: Normal rate and regular rhythm.      Pulses: Normal pulses.      Heart sounds: Normal heart sounds.   Pulmonary:      Effort: Pulmonary effort is normal.      Breath sounds: Wheezing present.   Musculoskeletal:         General: Normal range of motion.      Cervical back: Normal range of motion.   Skin:     General: Skin is warm and dry.      Capillary Refill: Capillary refill takes less than 2 seconds.   Neurological:      " General: No focal deficit present.      Mental Status: He is alert and oriented to person, place, and time.   Psychiatric:         Mood and Affect: Mood normal.         Behavior: Behavior normal.         Thought Content: Thought content normal.         Judgment: Judgment normal.        Result Review :    Common labs          2/23/2023    10:38   Common Labs   Glucose 105    BUN 19    Creatinine 1.46    Sodium 141    Potassium 4.5    Chloride 108    Calcium 10.1    Albumin 4.1    Total Bilirubin 0.9    Alkaline Phosphatase 66    AST (SGOT) 15    ALT (SGPT) 12    WBC 5.95    Hemoglobin 15.5    Hematocrit 44.1    Platelets 104          Current Outpatient Medications on File Prior to Visit   Medication Sig Dispense Refill    amLODIPine-benazepril (LOTREL 5-20) 5-20 MG per capsule TAKE 1 CAPSULE BY MOUTH DAILY 90 capsule 3    Coenzyme Q10 150 MG capsule Take 1 tablet by mouth Every Night.      Eliquis 5 MG tablet tablet TAKE 1 TABLET BY MOUTH EVERY 12  HOURS 180 tablet 3    escitalopram (LEXAPRO) 10 MG tablet TAKE 1 TABLET BY MOUTH DAILY 90 tablet 3    Melatonin 3 MG capsule Take 1 capsule by mouth Every Night.      metoprolol succinate XL (TOPROL-XL) 50 MG 24 hr tablet TAKE 1 TABLET BY MOUTH DAILY 90 tablet 3    pantoprazole (PROTONIX) 40 MG EC tablet Take 1 tablet by mouth Daily. 90 tablet 3    pravastatin (PRAVACHOL) 80 MG tablet TAKE 1 TABLET BY MOUTH AT NIGHT 90 tablet 3    tamsulosin (FLOMAX) 0.4 MG capsule 24 hr capsule TAKE 1 CAPSULE BY MOUTH DAILY 90 capsule 3     No current facility-administered medications on file prior to visit.              Assessment and Plan   Diagnoses and all orders for this visit:    1. Acute otitis media, unspecified otitis media type (Primary)  -     amoxicillin-clavulanate (AUGMENTIN) 875-125 MG per tablet; Take 1 tablet by mouth 2 (Two) Times a Day for 7 days.  Dispense: 14 tablet; Refill: 0    2. Wheezing  -     XR Chest PA & Lateral  -     promethazine-dextromethorphan  (PROMETHAZINE-DM) 6.25-15 MG/5ML syrup; Take 5 mL by mouth 4 (Four) Times a Day As Needed for Cough.  Dispense: 180 mL; Refill: 1  -     albuterol sulfate  (90 Base) MCG/ACT inhaler; Inhale 2 puffs Every 4 (Four) Hours As Needed for Wheezing.  Dispense: 8 g; Refill: 1    3. Viral upper respiratory tract infection  -     XR Chest PA & Lateral  -     promethazine-dextromethorphan (PROMETHAZINE-DM) 6.25-15 MG/5ML syrup; Take 5 mL by mouth 4 (Four) Times a Day As Needed for Cough.  Dispense: 180 mL; Refill: 1  -     albuterol sulfate  (90 Base) MCG/ACT inhaler; Inhale 2 puffs Every 4 (Four) Hours As Needed for Wheezing.  Dispense: 8 g; Refill: 1      Patient Instructions   Take antibiotic as prescribed. Recommend cough syrup at night. Take 2 puffs of inhaler every 4 hours as needed for shortness of breath or wheezing. Chest x-ray today. Stay hydrated with water. Follow-up as needed.              Follow Up   Return if symptoms worsen or fail to improve.  Patient was given instructions and counseling regarding his condition or for health maintenance advice. Please see specific information pulled into the AVS if appropriate.

## 2024-01-05 ENCOUNTER — OFFICE VISIT (OUTPATIENT)
Dept: INTERNAL MEDICINE | Facility: CLINIC | Age: 79
End: 2024-01-05
Payer: MEDICARE

## 2024-01-05 VITALS
BODY MASS INDEX: 28.28 KG/M2 | HEART RATE: 68 BPM | OXYGEN SATURATION: 97 % | SYSTOLIC BLOOD PRESSURE: 134 MMHG | DIASTOLIC BLOOD PRESSURE: 80 MMHG | WEIGHT: 202 LBS | HEIGHT: 71 IN

## 2024-01-05 DIAGNOSIS — H66.90 ACUTE OTITIS MEDIA, UNSPECIFIED OTITIS MEDIA TYPE: Primary | ICD-10-CM

## 2024-01-05 PROCEDURE — 1159F MED LIST DOCD IN RCRD: CPT

## 2024-01-05 PROCEDURE — 3079F DIAST BP 80-89 MM HG: CPT

## 2024-01-05 PROCEDURE — 3075F SYST BP GE 130 - 139MM HG: CPT

## 2024-01-05 PROCEDURE — 1160F RVW MEDS BY RX/DR IN RCRD: CPT

## 2024-01-05 PROCEDURE — 99213 OFFICE O/P EST LOW 20 MIN: CPT

## 2024-01-05 RX ORDER — DOXYCYCLINE HYCLATE 100 MG/1
100 CAPSULE ORAL 2 TIMES DAILY
Qty: 14 CAPSULE | Refills: 0 | Status: SHIPPED | OUTPATIENT
Start: 2024-01-05 | End: 2024-01-12

## 2024-01-05 NOTE — PATIENT INSTRUCTIONS
Take doxycycline as prescribed. Stay hydrated with water.  Limit salt intake. Follow-up as needed.

## 2024-01-05 NOTE — PROGRESS NOTES
"Chief Complaint  Ear Fullness and Cough    Subjective        Timi Wolf presents to [facility]  History of Present Illness  78-year-old male presenting with ear fullness and cough.  Patient Dr. Ruiz.  States that his symptoms improved on 1/1 but have returned.  Has nasal drainage but has not been coughing as much.  Denies fever, difficulty breathing or shortness of breath.  Cough        Objective   Vital Signs:  /80 (BP Location: Right arm, Patient Position: Sitting, Cuff Size: Adult)   Pulse 68   Ht 180.3 cm (71\")   Wt 91.6 kg (202 lb)   SpO2 97%   BMI 28.17 kg/m²   Estimated body mass index is 28.17 kg/m² as calculated from the following:    Height as of this encounter: 180.3 cm (71\").    Weight as of this encounter: 91.6 kg (202 lb).               Physical Exam  Vitals reviewed.   Constitutional:       Appearance: Normal appearance.   HENT:      Right Ear: Tympanic membrane is erythematous and bulging.      Left Ear: Tympanic membrane is erythematous and bulging.   Cardiovascular:      Rate and Rhythm: Normal rate and regular rhythm.      Pulses: Normal pulses.      Heart sounds: Normal heart sounds.   Pulmonary:      Effort: Pulmonary effort is normal.      Breath sounds: Normal breath sounds.   Musculoskeletal:         General: Normal range of motion.      Cervical back: Normal range of motion.   Skin:     General: Skin is warm and dry.      Capillary Refill: Capillary refill takes less than 2 seconds.   Neurological:      General: No focal deficit present.      Mental Status: He is alert and oriented to person, place, and time.   Psychiatric:         Mood and Affect: Mood normal.         Behavior: Behavior normal.         Thought Content: Thought content normal.         Judgment: Judgment normal.        Result Review :    Common labs          2/23/2023    10:38   Common Labs   Glucose 105    BUN 19    Creatinine 1.46    Sodium 141    Potassium 4.5    Chloride 108  "   Calcium 10.1    Albumin 4.1    Total Bilirubin 0.9    Alkaline Phosphatase 66    AST (SGOT) 15    ALT (SGPT) 12    WBC 5.95    Hemoglobin 15.5    Hematocrit 44.1    Platelets 104        Current Outpatient Medications on File Prior to Visit   Medication Sig Dispense Refill    amLODIPine-benazepril (LOTREL 5-20) 5-20 MG per capsule TAKE 1 CAPSULE BY MOUTH DAILY 90 capsule 3    Coenzyme Q10 150 MG capsule Take 1 tablet by mouth Every Night.      Eliquis 5 MG tablet tablet TAKE 1 TABLET BY MOUTH EVERY 12  HOURS 180 tablet 3    escitalopram (LEXAPRO) 10 MG tablet TAKE 1 TABLET BY MOUTH DAILY 90 tablet 3    Melatonin 3 MG capsule Take 1 capsule by mouth Every Night.      metoprolol succinate XL (TOPROL-XL) 50 MG 24 hr tablet TAKE 1 TABLET BY MOUTH DAILY 90 tablet 3    pantoprazole (PROTONIX) 40 MG EC tablet Take 1 tablet by mouth Daily. 90 tablet 3    pravastatin (PRAVACHOL) 80 MG tablet TAKE 1 TABLET BY MOUTH AT NIGHT 90 tablet 3    tamsulosin (FLOMAX) 0.4 MG capsule 24 hr capsule TAKE 1 CAPSULE BY MOUTH DAILY 90 capsule 3    albuterol sulfate  (90 Base) MCG/ACT inhaler Inhale 2 puffs Every 4 (Four) Hours As Needed for Wheezing. (Patient not taking: Reported on 1/5/2024) 8 g 1    promethazine-dextromethorphan (PROMETHAZINE-DM) 6.25-15 MG/5ML syrup Take 5 mL by mouth 4 (Four) Times a Day As Needed for Cough. (Patient not taking: Reported on 1/5/2024) 180 mL 1     No current facility-administered medications on file prior to visit.                Assessment and Plan   Diagnoses and all orders for this visit:    1. Acute otitis media, unspecified otitis media type (Primary)  -     doxycycline (VIBRAMYCIN) 100 MG capsule; Take 1 capsule by mouth 2 (Two) Times a Day for 7 days.  Dispense: 14 capsule; Refill: 0      Patient Instructions   Take doxycycline as prescribed. Stay hydrated with water.  Limit salt intake. Follow-up as needed.         Follow Up   Return if symptoms worsen or fail to improve.  Patient was  given instructions and counseling regarding his condition or for health maintenance advice. Please see specific information pulled into the AVS if appropriate.

## 2024-02-19 RX ORDER — APIXABAN 5 MG/1
TABLET, FILM COATED ORAL
Qty: 180 TABLET | Refills: 3 | Status: SHIPPED | OUTPATIENT
Start: 2024-02-19

## 2024-03-05 ENCOUNTER — OFFICE VISIT (OUTPATIENT)
Dept: ONCOLOGY | Facility: CLINIC | Age: 79
End: 2024-03-05
Payer: MEDICARE

## 2024-03-05 ENCOUNTER — LAB (OUTPATIENT)
Dept: LAB | Facility: HOSPITAL | Age: 79
End: 2024-03-05
Payer: MEDICARE

## 2024-03-05 VITALS
TEMPERATURE: 98 F | HEIGHT: 71 IN | DIASTOLIC BLOOD PRESSURE: 87 MMHG | WEIGHT: 204.4 LBS | RESPIRATION RATE: 18 BRPM | HEART RATE: 57 BPM | BODY MASS INDEX: 28.61 KG/M2 | OXYGEN SATURATION: 97 % | SYSTOLIC BLOOD PRESSURE: 165 MMHG

## 2024-03-05 DIAGNOSIS — N40.0 PROSTATE ENLARGEMENT: ICD-10-CM

## 2024-03-05 DIAGNOSIS — D68.59 HYPERCOAGULABLE STATE: ICD-10-CM

## 2024-03-05 DIAGNOSIS — D69.6 THROMBOCYTOPENIA: ICD-10-CM

## 2024-03-05 DIAGNOSIS — D68.51 FACTOR 5 LEIDEN MUTATION, HETEROZYGOUS: ICD-10-CM

## 2024-03-05 DIAGNOSIS — I81 PORTAL VEIN THROMBOSIS: ICD-10-CM

## 2024-03-05 DIAGNOSIS — Z79.01 CURRENT USE OF LONG TERM ANTICOAGULATION: ICD-10-CM

## 2024-03-05 DIAGNOSIS — D68.51 FACTOR 5 LEIDEN MUTATION, HETEROZYGOUS: Primary | ICD-10-CM

## 2024-03-05 LAB
ALBUMIN SERPL-MCNC: 3.7 G/DL (ref 3.5–5.2)
ALBUMIN/GLOB SERPL: 1.7 G/DL
ALP SERPL-CCNC: 70 U/L (ref 39–117)
ALT SERPL W P-5'-P-CCNC: 10 U/L (ref 1–41)
ANION GAP SERPL CALCULATED.3IONS-SCNC: 9.2 MMOL/L (ref 5–15)
AST SERPL-CCNC: 19 U/L (ref 1–40)
BASOPHILS # BLD AUTO: 0.05 10*3/MM3 (ref 0–0.2)
BASOPHILS NFR BLD AUTO: 0.8 % (ref 0–1.5)
BILIRUB SERPL-MCNC: 0.4 MG/DL (ref 0–1.2)
BUN SERPL-MCNC: 22 MG/DL (ref 8–23)
BUN/CREAT SERPL: 14.2 (ref 7–25)
CALCIUM SPEC-SCNC: 9.4 MG/DL (ref 8.6–10.5)
CHLORIDE SERPL-SCNC: 107 MMOL/L (ref 98–107)
CO2 SERPL-SCNC: 22.8 MMOL/L (ref 22–29)
CREAT SERPL-MCNC: 1.55 MG/DL (ref 0.76–1.27)
DEPRECATED RDW RBC AUTO: 41.8 FL (ref 37–54)
EGFRCR SERPLBLD CKD-EPI 2021: 46.1 ML/MIN/1.73
EOSINOPHIL # BLD AUTO: 0.34 10*3/MM3 (ref 0–0.4)
EOSINOPHIL NFR BLD AUTO: 5.5 % (ref 0.3–6.2)
ERYTHROCYTE [DISTWIDTH] IN BLOOD BY AUTOMATED COUNT: 12.4 % (ref 12.3–15.4)
GLOBULIN UR ELPH-MCNC: 2.2 GM/DL
GLUCOSE SERPL-MCNC: 93 MG/DL (ref 65–99)
HCT VFR BLD AUTO: 43.4 % (ref 37.5–51)
HGB BLD-MCNC: 15.1 G/DL (ref 13–17.7)
IMM GRANULOCYTES # BLD AUTO: 0.04 10*3/MM3 (ref 0–0.05)
IMM GRANULOCYTES NFR BLD AUTO: 0.6 % (ref 0–0.5)
LYMPHOCYTES # BLD AUTO: 1.99 10*3/MM3 (ref 0.7–3.1)
LYMPHOCYTES NFR BLD AUTO: 32 % (ref 19.6–45.3)
MCH RBC QN AUTO: 31.7 PG (ref 26.6–33)
MCHC RBC AUTO-ENTMCNC: 34.8 G/DL (ref 31.5–35.7)
MCV RBC AUTO: 91.2 FL (ref 79–97)
MONOCYTES # BLD AUTO: 0.74 10*3/MM3 (ref 0.1–0.9)
MONOCYTES NFR BLD AUTO: 11.9 % (ref 5–12)
NEUTROPHILS NFR BLD AUTO: 3.06 10*3/MM3 (ref 1.7–7)
NEUTROPHILS NFR BLD AUTO: 49.2 % (ref 42.7–76)
NRBC BLD AUTO-RTO: 0 /100 WBC (ref 0–0.2)
PLATELET # BLD AUTO: 109 10*3/MM3 (ref 140–450)
PMV BLD AUTO: 10.3 FL (ref 6–12)
POTASSIUM SERPL-SCNC: 4.2 MMOL/L (ref 3.5–5.2)
PROT SERPL-MCNC: 5.9 G/DL (ref 6–8.5)
PSA SERPL-MCNC: 1.31 NG/ML (ref 0–4)
RBC # BLD AUTO: 4.76 10*6/MM3 (ref 4.14–5.8)
SODIUM SERPL-SCNC: 139 MMOL/L (ref 136–145)
WBC NRBC COR # BLD AUTO: 6.22 10*3/MM3 (ref 3.4–10.8)

## 2024-03-05 PROCEDURE — 80053 COMPREHEN METABOLIC PANEL: CPT

## 2024-03-05 PROCEDURE — 36415 COLL VENOUS BLD VENIPUNCTURE: CPT

## 2024-03-05 PROCEDURE — 84153 ASSAY OF PSA TOTAL: CPT | Performed by: INTERNAL MEDICINE

## 2024-03-05 PROCEDURE — 85025 COMPLETE CBC W/AUTO DIFF WBC: CPT

## 2024-03-05 NOTE — PROGRESS NOTES
Subjective        REASONS FOR FOLLOWUP: pylephlebitis, FACTOR V LEIDEN MUTATION, ANTIGLYCOPROTEIN ANTIBODY POSITIVE     HISTORY OF PRESENT ILLNESS:    On 03/05/2024, this 78-year-old male who developed portal vein thrombosis along with splenomegaly in the background of heterozygosity for factor V Leiden mutation remains for life anticoagulation with Eliquis returns to the office with no significant symptoms. He has no abdominal pain, distention, jaundice, changes in bowel habits. His appetite is excellent. He has not had any clinical bleeding. He has proper bowel activity. Urination is normal. Besides this no other new problems. His wife remains ill. She also has been diagnosed with pulmonary embolism and she is being seen in our office.          Past Medical History:   Diagnosis Date    Abdominal pain     Arteriosclerotic cardiovascular disease     Benign prostatic hyperplasia     Cataract     Diverticulosis     Dysphagia     GERD (gastroesophageal reflux disease)     H/O Interstitial cystitis     HL (hearing loss)     Hyperlipidemia     Hypertension     Internal hemorrhoids     Prostate enlargement     Pulmonary embolism     Renal cyst, right     Umbilical hernia         Past Surgical History:   Procedure Laterality Date    ACHILLES TENDON REPAIR      ruptured tendon    COLONOSCOPY N/A 2012    Dr Alvarado/[facility]    ENDOSCOPY      ENDOSCOPY N/A 09/29/2022    Procedure: ESOPHAGOGASTRODUODENOSCOPY with biopsy, wilson dilation;  Surgeon: Noel Alvarado MD;  Location: [facility] ENDOSCOPY;  Service: Gastroenterology;  Laterality: N/A;  hiatal hernia, gastritis    SHOULDER SURGERY      TONSILLECTOMY      UPPER GASTROINTESTINAL ENDOSCOPY  2022        Current Outpatient Medications on File Prior to Visit   Medication Sig Dispense Refill    amLODIPine-benazepril (LOTREL 5-20) 5-20 MG per capsule TAKE 1 CAPSULE BY MOUTH DAILY 90 capsule 3    Coenzyme Q10 150 MG capsule Take 1 tablet by mouth Every Night.      escitalopram  (LEXAPRO) 10 MG tablet TAKE 1 TABLET BY MOUTH DAILY 90 tablet 3    Melatonin 3 MG capsule Take 1 capsule by mouth Every Night.      metoprolol succinate XL (TOPROL-XL) 50 MG 24 hr tablet TAKE 1 TABLET BY MOUTH DAILY 90 tablet 3    pantoprazole (PROTONIX) 40 MG EC tablet Take 1 tablet by mouth Daily. 90 tablet 3    pravastatin (PRAVACHOL) 80 MG tablet TAKE 1 TABLET BY MOUTH AT NIGHT 90 tablet 3    tamsulosin (FLOMAX) 0.4 MG capsule 24 hr capsule TAKE 1 CAPSULE BY MOUTH DAILY 90 capsule 3    [DISCONTINUED] Eliquis 5 MG tablet tablet TAKE 1 TABLET BY MOUTH EVERY 12  HOURS 180 tablet 3    [DISCONTINUED] albuterol sulfate  (90 Base) MCG/ACT inhaler Inhale 2 puffs Every 4 (Four) Hours As Needed for Wheezing. (Patient not taking: Reported on 2024) 8 g 1    [DISCONTINUED] promethazine-dextromethorphan (PROMETHAZINE-DM) 6.25-15 MG/5ML syrup Take 5 mL by mouth 4 (Four) Times a Day As Needed for Cough. (Patient not taking: Reported on 2024) 180 mL 1     No current facility-administered medications on file prior to visit.        ALLERGIES:    Allergies   Allergen Reactions    Sulfamethoxazole-Trimethoprim Itching     Abdominal pain as well         Social History     Socioeconomic History    Marital status:      Spouse name: Amarilys   Tobacco Use    Smoking status: Former     Current packs/day: 1.00     Types: Cigarettes    Smokeless tobacco: Never    Tobacco comments:     Quit over 30 years ago   Substance and Sexual Activity    Alcohol use: No    Drug use: No    Sexual activity: Defer        Family History   Problem Relation Age of Onset    Heart attack Father     Heart disease Father          at 47    Hypertension Mother         Still living  [facility] 1924 (96 yrs. Old)        HEMATOLOGY HISTORY:  75-year-old white male who has had abdominal pain for almost 1 month in the right upper quadrant and has been progressively worse and becoming almost epigastric, especially in the morning with an intensity  of 7 out of 10. He has occasional nausea but no vomiting. He has sensation of distended abdomen. He has not had excessive burping or belching and neither flatulence. He has been having normal bowel activity with no passage of blood in the stool. No diarrhea. He has not developed any jaundice. Urination has remained normal. He has had minimal swelling in his lower extremities and he states his feet remain cold all the time. Given the persistency of his symptoms and given the fact that he was seen at an urgent care center at [facility] and advised to be seen by Gastroenterology at [place] and given an appointment for 6 weeks, he decided to come to the emergency room. At the time of the emergency room visit, a CT scan of the abdomen was done that documented extensive thrombosis of the portal vein. The extension of the thrombosis was into the splenic vein into the superior mesenteric vein. The patient was placed immediately on heparin. Since then the symptoms have improved some. He has no nausea or vomiting this morning. His abdomen still remains kind of distended but he has had good bowel activity this morning with no passage of blood in the stool. He has not had any fever, chills or sweats. He denies any cough, sputum production or shortness of breath. He has no bone pain. No joint pain. No rash in the skin. No alopecia. No sores in his mouth and no lesions in the skin otherwise like livedo reticularis. The patient denies any neurological symptoms. He denies any changes in performance status. His weight has remained stable. He is very much indispensable at home. His wife has multiple comorbidities and he is the only one that is able to handle her.    THROMBOPHILIA  CLINICAL AND LABORATORY REPORT:  DATE: 4/ /16 /21     DIAGNOSIS: PYLEPHLEBITIS 3/2021      KEY: P EQUAL TO POSITIVE  , N  EQUAL TO NEGATIVE.    PREPARED BY SENTHIL KELLEY MD STAYS IN THE CHART PERMANENTLY      CLINICAL FACTORS:     OBESITY: ___Y___      DIABETES: __N____.    IMMOBILITY: __N____.    SMOKING HISTORY : ____N________ PACK A DAY/ YEARS ________    LONG TRIPS BY CAR OR AIRPLANE: ___N_____    RECENT SURGERY: ___N_______   LOCATION: ________    TRAUMA: ____N____    MEDICATIONS:   BIRTH CONTROL MEDICATIONS ____N____ ANDROGENS _N______ ESTROGENS __N______ HEPARIN N________ OTHERS ____N___    PREGNANCY:   ____N____.    FAMILY HISTORY OF BLOOD CLOTHS:    POSITIVE:   ________ NEGATIVE __N_____    HEMATOLOGIC DISORDER:  POLYCYTHEMIA VERA ___N____ THROMBOCYTOSIS _N_____    DIC:___N_____  TTP ___N_______    COLLAGEN VASCULAR DISEASE: __N___   DIAGNOSIS: ________________________.    VASCULITIS: TYPE AND LOCATION ____N___________ BIOPSY PROVEN:____________    CANCER DIAGNOSIS: ___N____    TYPE ________    CANCER SCREENING:  BREAST _______ COLON ___Y_____ LUNG ________ GENITOURINARY ___Y______   CT SCANS _____Y______ NORMAL ______ ABNORMAL ________    INDWELLING VASCULAR DEVICE:  ____N_____. LOCATION _________    INFLAMMATORY BOWEL DISEASE: ULCERATIVE COLITIS ______N____   CHRON'S DISEASE _____N______    RECENT COVID 19 INFECTION ______N_________.          LABORATORY:    FACTOR V LEIDEN MUTATION: POSITIVE   _Y____ NEGATIVE _____ HETEROZYGOUS __Y______ HOMOZYGOUS ________    PROTHROMBIN GENE MUTATION:  POSITIVE _____ NEGATIVE _N____    ANTITHROMBIN III : NORMAL ___N__   ABNORMAL ______ QUANTIFICATION: _______    PROTEIN S ANTIGEN _____N_____ PROTEIN S FUNCTIONAL ___N_____ PROTEIN C _N________     LUPUS ANTICOAGULANT: POSITIVE _____  NEGATIVE _N____. REPEATED  6 WEEKS LATER ____YES POSITIVE__    ANTICARDIOLIPIN ANTIBODY PROFILE : POSITIVE ______  NEGATIVE _N_____  IGG _____ IGM _____ REPEATED 6 WEEKS LATER _________    ANTI GLYCOPROTEIN ANTIBODY:  POSITIVE __Y___  NEGATIVE  _______   IGG _____ IGM _Y/19____ REPEATED 12 WEEKS LATER __Y IT REMAINS POSITIVE______    ANTIPHOSPATIDIL SERINE ANTIBODY:  POSITIVE _____ NEGATIVE _______    SERUM PROTEIN ELECTROPHORESIS AND  "IMMUNOFIXATION: NO MONOCLONAL PROTEIN __N______ POSITIVE MONOCLONAL PROTEIN ______ TYPE _________    C REACTIVE PROTEIN HIGH SENSIBILITY: NORMAL __Y_____ ABNORMAL :QUANTIFICATION ________    SEDIMENTATION RATE: _____ MM/H.    FIBRINOGEN LEVEL: _________ NORMAL _______  ABNORMAL _Y/465_________.    LIPID PROFILE:    CHOLESTEROL HIGH ______Y____  TRYGLYCERIDES HIGH  __Y______    HEPARIN ASSOCIATED ANTIPLATELET ANTIBODY: _NA________        Objective     Vitals:    03/05/24 1636   BP: 165/87   Pulse: 57   Resp: 18   Temp: 98 °F (36.7 °C)   TempSrc: Temporal   SpO2: 97%   Weight: 92.7 kg (204 lb 6.4 oz)   Height: 180.3 cm (70.98\")   PainSc: 0-No pain         3/5/2024     4:36 PM   Current Status   ECOG score 0     Exam:              GENERAL:  Well-developed, Patient  in no acute distress.   SKIN:  Warm, dry ,NO purpura ,no rash.  HEENT:  Pupils were equal and reactive to light and accomodation, conjunctivae noninjected,  normal visual acuity.   NECK:  Supple with good range of motion; no thyromegaly , no JVD or bruits,.No carotid artery pain, no carotid abnormal pulsation   LYMPHATICS:  No cervical, NO supraclavicular, NO axillary, NO inguinal adenopathies.  CARDIAC   normal rate , regular rhythm, without murmur,NO rubs NO S3 NO S4   LUNGS: normal breath sounds bilateral, no wheezing, NO rhonchi, NO crackles ,NO rubs.  VASCULAR VENOUS: no cyanosis, NO collateral circulation, NO varicosities, NO edema, NO palpable cords, NO pain,NO erythema, NO pigmentation of the skin.  ABDOMEN:  Soft, NO pain,no hepatomegaly, no splenomegaly,no masses, no ascites, no collateral circulation,no distention.Diastasis of the abdominal recti musculature in the upper abdomen.      EXTREMITIES  AND SPINE:  No clubbing, no cyanosis ,no deformities , no pain .No kyphosis,  no pain in spine, no pain in ribs , no pain in pelvic bone.  NEUROLOGICAL:  Patient was awake, alert, oriented to time, person and place.        RECENT LABS:  Hematology WBC "   Date Value Ref Range Status   03/05/2024 6.22 3.40 - 10.80 10*3/mm3 Final   05/26/2022 7.5 3.4 - 10.8 x10E3/uL Final   03/18/2021 7.8 4.5 - 11.0 10*3/uL Final     RBC   Date Value Ref Range Status   03/05/2024 4.76 4.14 - 5.80 10*6/mm3 Final   05/26/2022 5.09 4.14 - 5.80 x10E6/uL Final   03/18/2021 4.76 4.5 - 5.9 10*6/uL Final     Hemoglobin   Date Value Ref Range Status   03/05/2024 15.1 13.0 - 17.7 g/dL Final   03/18/2021 14.1 13.5 - 17.5 g/dL Final     Hematocrit   Date Value Ref Range Status   03/05/2024 43.4 37.5 - 51.0 % Final   03/18/2021 41.9 41.0 - 53.0 % Final     Platelets   Date Value Ref Range Status   03/05/2024 109 (L) 140 - 450 10*3/mm3 Final   03/18/2021 158 140 - 440 10*3/uL Final       CBC:    WBC   Date Value Ref Range Status   03/05/2024 6.22 3.40 - 10.80 10*3/mm3 Final   05/26/2022 7.5 3.4 - 10.8 x10E3/uL Final   03/18/2021 7.8 4.5 - 11.0 10*3/uL Final     RBC   Date Value Ref Range Status   03/05/2024 4.76 4.14 - 5.80 10*6/mm3 Final   05/26/2022 5.09 4.14 - 5.80 x10E6/uL Final   03/18/2021 4.76 4.5 - 5.9 10*6/uL Final     Hemoglobin   Date Value Ref Range Status   03/05/2024 15.1 13.0 - 17.7 g/dL Final   03/18/2021 14.1 13.5 - 17.5 g/dL Final     Hematocrit   Date Value Ref Range Status   03/05/2024 43.4 37.5 - 51.0 % Final   03/18/2021 41.9 41.0 - 53.0 % Final     MCV   Date Value Ref Range Status   03/05/2024 91.2 79.0 - 97.0 fL Final   03/18/2021 88.0 80.0 - 100.0 fL Final     MCH   Date Value Ref Range Status   03/05/2024 31.7 26.6 - 33.0 pg Final   03/18/2021 29.6 26.0 - 34.0 pg Final     MCHC   Date Value Ref Range Status   03/05/2024 34.8 31.5 - 35.7 g/dL Final   03/18/2021 33.7 31.0 - 37.0 g/dL Final     RDW   Date Value Ref Range Status   03/05/2024 12.4 12.3 - 15.4 % Final   03/18/2021 11.8 (L) 12.0 - 16.8 % Final     RDW-SD   Date Value Ref Range Status   03/05/2024 41.8 37.0 - 54.0 fl Final     MPV   Date Value Ref Range Status   03/05/2024 10.3 6.0 - 12.0 fL Final   03/18/2021  9.3 8.4 - 12.4 fL Final     Platelets   Date Value Ref Range Status   03/05/2024 109 (L) 140 - 450 10*3/mm3 Final   03/18/2021 158 140 - 440 10*3/uL Final     Neutrophil Rel %   Date Value Ref Range Status   03/18/2021 60.7 45 - 80 % Final     Neutrophil %   Date Value Ref Range Status   03/05/2024 49.2 42.7 - 76.0 % Final     Lymphocyte Rel %   Date Value Ref Range Status   03/18/2021 22.3 15 - 50 % Final     Lymphocyte %   Date Value Ref Range Status   03/05/2024 32.0 19.6 - 45.3 % Final     Monocyte %   Date Value Ref Range Status   03/05/2024 11.9 5.0 - 12.0 % Final     Eosinophil %   Date Value Ref Range Status   03/05/2024 5.5 0.3 - 6.2 % Final     Basophil %   Date Value Ref Range Status   03/05/2024 0.8 0.0 - 1.5 % Final     Immature Grans %   Date Value Ref Range Status   03/05/2024 0.6 (H) 0.0 - 0.5 % Final     Neutrophils Absolute   Date Value Ref Range Status   03/18/2021 4.80 2.0 - 8.8 10*3/uL Final     Neutrophils, Absolute   Date Value Ref Range Status   03/05/2024 3.06 1.70 - 7.00 10*3/mm3 Final     Lymphocytes Absolute   Date Value Ref Range Status   03/18/2021 1.70 0.7 - 5.5 10*3/uL Final     Lymphocytes, Absolute   Date Value Ref Range Status   03/05/2024 1.99 0.70 - 3.10 10*3/mm3 Final     Monocytes, Absolute   Date Value Ref Range Status   03/05/2024 0.74 0.10 - 0.90 10*3/mm3 Final     Eosinophils, Absolute   Date Value Ref Range Status   03/05/2024 0.34 0.00 - 0.40 10*3/mm3 Final     Basophils, Absolute   Date Value Ref Range Status   03/05/2024 0.05 0.00 - 0.20 10*3/mm3 Final     Immature Grans, Absolute   Date Value Ref Range Status   03/05/2024 0.04 0.00 - 0.05 10*3/mm3 Final     nRBC   Date Value Ref Range Status   03/05/2024 0.0 0.0 - 0.2 /100 WBC Final        CMP:    Glucose   Date Value Ref Range Status   03/05/2024 93 65 - 99 mg/dL Final     BUN   Date Value Ref Range Status   03/05/2024 22 8 - 23 mg/dL Final     Creatinine   Date Value Ref Range Status   03/05/2024 1.55 (H) 0.76 - 1.27  mg/dL Final   10/06/2021 1.30 0.60 - 1.30 mg/dL Final     Comment:     Serial Number: [id number]:  [id number]     Sodium   Date Value Ref Range Status   03/05/2024 139 136 - 145 mmol/L Final     Potassium   Date Value Ref Range Status   03/05/2024 4.2 3.5 - 5.2 mmol/L Final     Chloride   Date Value Ref Range Status   03/05/2024 107 98 - 107 mmol/L Final     CO2   Date Value Ref Range Status   03/05/2024 22.8 22.0 - 29.0 mmol/L Final     Calcium   Date Value Ref Range Status   03/05/2024 9.4 8.6 - 10.5 mg/dL Final     Total Protein   Date Value Ref Range Status   03/05/2024 5.9 (L) 6.0 - 8.5 g/dL Final     Albumin   Date Value Ref Range Status   03/05/2024 3.7 3.5 - 5.2 g/dL Final     ALT (SGPT)   Date Value Ref Range Status   03/05/2024 10 1 - 41 U/L Final     AST (SGOT)   Date Value Ref Range Status   03/05/2024 19 1 - 40 U/L Final     Alkaline Phosphatase   Date Value Ref Range Status   03/05/2024 70 39 - 117 U/L Final     Total Bilirubin   Date Value Ref Range Status   03/05/2024 0.4 0.0 - 1.2 mg/dL Final     eGFR  Am   Date Value Ref Range Status   11/22/2021 59 (L) >59 mL/min/1.73 Final     Comment:     **In accordance with recommendations from the NKF-ASN Task force,**    [facility] is in the process of updating its eGFR calculation to the    2021 CKD-EPI creatinine equation that estimates kidney function    without a race variable.       Globulin   Date Value Ref Range Status   03/05/2024 2.2 gm/dL Final     A/G Ratio   Date Value Ref Range Status   03/05/2024 1.7 g/dL Final     BUN/Creatinine Ratio   Date Value Ref Range Status   03/05/2024 14.2 7.0 - 25.0 Final     Anion Gap   Date Value Ref Range Status   03/05/2024 9.2 5.0 - 15.0 mmol/L Final                    Assessment & Plan     Diagnoses and all orders for this visit:    1. Factor 5 Leiden mutation, heterozygous (Primary)  -     CBC & Differential; Future  -     Comprehensive Metabolic Panel; Future  -     PSA Diagnostic    2. Current use of  long term anticoagulation  -     CBC & Differential; Future  -     Comprehensive Metabolic Panel; Future  -     PSA Diagnostic    3. Thrombocytopenia  -     CBC & Differential; Future  -     Comprehensive Metabolic Panel; Future  -     PSA Diagnostic    4. Portal vein thrombosis  -     CBC & Differential; Future  -     Comprehensive Metabolic Panel; Future  -     PSA Diagnostic    5. Prostate enlargement  -     CBC & Differential; Future  -     Comprehensive Metabolic Panel; Future  -     PSA Diagnostic    6. Hypercoagulable state  -     CBC & Differential; Future  -     Comprehensive Metabolic Panel; Future  -     PSA Diagnostic    Other orders  -     apixaban (ELIQUIS) 2.5 MG tablet tablet; Take 1 tablet by mouth 2 (Two) Times a Day.  Dispense: 180 tablet; Refill: 3        1.   Portal vein thrombosis diagnosed March 2021:  Present to ER with abdominal pain, diagnosed pylephlebitis and he was placed on heparin and subsequently Eliquis and discharged home. Since discharge his abdominal pain has almost substantially improved to the point of resolution and he has no abdominal distention.   heterozygous for factor V Leiden mutation and he has positivity for anti-glycoprotein antibodies. The rest of the thrombophilia workup stated above is negative.   eviewed on 06/07/2021. He has not had any new manifestations of his portal vein thrombosis in fact no symptoms at all. A CT scan that I have personally reviewed has documented cavernous transformation of the portal vein and recanalization as well as multiple collateral circulation formation. That means that the flow and the venous return from the growth into the liver is appropriately directed. The patient has not had any new thrombosis. His antiglycoprotein antibody remains positive 44 in comparison with previous assessment. He has also a lupus anticoagulant positive.   I pointed out to the patient that given the circumstances and his factor V Leiden mutation  heterozygosity he is to remain on anticoagulation for the rest of his life.   The patient was reviewed on 12/01/2021. He has not had any further GI symptomatology. The Prilosec has made a big difference in regard to his issues. He has no abdominal distention. His physical exam disclosed no abdominal wall collateral circulation but radiologically his CT scan posted above documents abundant collaterals surrounding the portal system. There are no alterations in the pancreas, gallbladder, liver, spleen, kidney anatomies. Aorta with minimal atherosclerosis, no retroperitoneal adenopathy, no ascites.     I advised him to remain on the Eliquis obviously staying away from trauma.   On 06/22/2022, the patient was reviewed in regard to his history of thrombophilia. He remains on Eliquis. No clinical bleeding. No new thrombosis. No abdominal symptomatology, no modification in the size of his abdomen, no liver enlargement, no obvious ascites, no obvious collateral circulation in the abdominal wall. He was advised again about prevention of trauma. He will return to see us in 6 months with a CBC and a CMP.  On 02/23/2023 the patient has no symptoms or signs of pylephlebitis. He has no collateral circulation in his abdominal wall. He has no ascites or pain. No liver enlargement or splenomegaly. He remains on Eliquis and he will remain on this medicine for the time being. He has not developed any clinical bleeding and the hemoglobin, white count and platelet count remain normal. He will be reviewed again in 6 months or a year.    On 03/05/2024, the patient was further reviewed. Since the original consultation in 2021, he has remained on Eliquis at 5 mg twice a day. He has not had any other episodes of thrombosis and I think it is time to modify dosing. Given the low tendency for this condition to recur and given the fact that the patient remains on anticoagulants and he has not had any other new triggering factors, I advised the  patient to modify the dose of Eliquis from 5 mg twice a day to 2.5 mg twice a day. New batch of prescription was sent to his long-term pharmacy. He has no need for any tapering down or transitions of any nature. He will just go one day from 5 mg tablet to 2.5 mg tablet and get going. He was still advised to minimal the potential for trauma and bleeding.          2.  Abdominal pain:   Seen 9/28/2021 for triage visit due to recent intermittent abdominal pain, epigastric and radiating to the back.  He denies any other associated pain or symptoms.  He does not feel this is reflux he does not have the classic burning up the esophagus.  He however occasionally has a raspy cough.  He has not tried any over-the-counter Prilosec or Pepcid recently.  He states in the past he did have issues with this but not recently.  He denies nausea, edema, shortness of breath, or pain with deep breath.  He states he is not missed any of his Eliquis dosing.  This is reviewed with Dr. Magdaleno today and we will proceed with a CT abdomen pelvis for evaluation due to his portal vein thrombosis.  We will also start the patient on Prilosec 20 mg daily for 1 month to see if this helps his symptoms.  CMP was performed today which was unremarkable.   Patient was seen via telehealth visit on 10/12/2021 to review CT scans.  Prior to the visit I had reviewed and discussed his CT scans with Dr. Magdaleno who reviewed the images and felt that his CT scan was stable.  Discussed with the patient he is to remain on anticoagulation with Eliquis 5 mg every 12 hours for the remainder of his life.  We did discuss trying to space the dosing about every 12 hours.  We discussed trying Pepcid complete to see if this would help relieve his reflux symptoms as needed.  Call with worsening abdominal pain or other concerns.  We discussed eating smaller more frequent meals.  The Prilosec has made a big difference in regard to his abdominal symptomatology and he will remain  on the present dose 20 mg a day for the rest of the year. He will have a new prescription sent to [facility] in this regard as well. Otherwise no other new issues for him. I advised him to stay away from trauma and minimize any potentiality for falls during winter months, frozen steps, ice and snow.  On 06/22/2022, he has no abdominal pain since proton pump inhibitor was initiated. The patient was advised to remain on this medicine. He no longer had any abdominal pain or discomfort.  On 02/23/2023 no evidence of abdominal pain. We documented clinically a large epigastric hernia. This per se has no need for any intervention from my point of view.    On 03/05/2024, no abdominal pain documented. The diastasis of his abdominal musculature is no different than before and has no need for intervention.       3.Regarding thrombocytopenia, the patient’s platelet count is around 110,000. No palpable splenomegaly. Radiologically the patient has no obvious splenomegaly but the thrombosis of the portal vein makes suggestion that he could have some sequestration of platelets by the spleen. He has not been taking any medicine that would trigger thrombocytopenia and he has no other comorbidity that would lead into thrombocytopenia. An IPF is pending today. We will watch this in absence of any other intervention.  On 02/23/2023 his platelet count actually is very good today into the normal limits at 104,000. He will remain in observation from this point of view. It is very likely that an element of passive congestion and splenomegaly associated with portal vein thrombosis triggered sequestration of platelets that has no implications for him at this point.    On 03/05/2024, thrombocytopenia is no different today than what it has been before. Associated with splenomegaly in the background of portal vein thrombosis. The spleen clinically is not palpable. His white count is normal. He will remain in observation from this point of  view.        On 03/05/2024, the patient will make the transition to a lower dose Eliquis as posted above and he will be returning to see us back in a year. Today, he requested to have a PSA done. He will be called at home with the report of this. He will be seen back in a year with a CBC and a PSA. CMP will be done as well.          Danie Magdaleno MD

## 2024-03-06 ENCOUNTER — TELEPHONE (OUTPATIENT)
Dept: ONCOLOGY | Facility: CLINIC | Age: 79
End: 2024-03-06
Payer: MEDICARE

## 2024-03-06 NOTE — TELEPHONE ENCOUNTER
----- Message from Danie Magdaleno MD sent at 3/5/2024  6:24 PM EST -----  CALL HIS KIDNEY FUNCTION ABOUT THE SAME, PSA WAS NORMAL

## 2024-03-25 RX ORDER — PANTOPRAZOLE SODIUM 40 MG/1
40 TABLET, DELAYED RELEASE ORAL DAILY
Qty: 90 TABLET | Refills: 0 | Status: SHIPPED | OUTPATIENT
Start: 2024-03-25

## 2024-06-12 RX ORDER — PANTOPRAZOLE SODIUM 40 MG/1
40 TABLET, DELAYED RELEASE ORAL DAILY
Qty: 90 TABLET | Refills: 3 | OUTPATIENT
Start: 2024-06-12

## 2024-06-22 ENCOUNTER — APPOINTMENT (OUTPATIENT)
Dept: MRI IMAGING | Facility: HOSPITAL | Age: 79
End: 2024-06-22
Payer: MEDICARE

## 2024-06-22 ENCOUNTER — HOSPITAL ENCOUNTER (OUTPATIENT)
Facility: HOSPITAL | Age: 79
Setting detail: OBSERVATION
Discharge: HOME | End: 2024-06-23
Attending: EMERGENCY MEDICINE | Admitting: EMERGENCY MEDICINE
Payer: MEDICARE

## 2024-06-22 ENCOUNTER — APPOINTMENT (OUTPATIENT)
Dept: GENERAL RADIOLOGY | Facility: HOSPITAL | Age: 79
End: 2024-06-22
Payer: MEDICARE

## 2024-06-22 ENCOUNTER — APPOINTMENT (OUTPATIENT)
Dept: CT IMAGING | Facility: HOSPITAL | Age: 79
End: 2024-06-22
Payer: MEDICARE

## 2024-06-22 DIAGNOSIS — R51.9 FRONTAL HEADACHE: ICD-10-CM

## 2024-06-22 DIAGNOSIS — G45.4 TRANSIENT GLOBAL AMNESIA: Primary | ICD-10-CM

## 2024-06-22 PROBLEM — R47.01 APHASIA: Status: ACTIVE | Noted: 2024-06-22

## 2024-06-22 PROBLEM — R41.3 AMNESIA: Status: ACTIVE | Noted: 2024-06-22

## 2024-06-22 LAB
ALBUMIN SERPL-MCNC: 3.9 G/DL (ref 3.5–5.2)
ALBUMIN/GLOB SERPL: 1.7 G/DL
ALP SERPL-CCNC: 67 U/L (ref 39–117)
ALT SERPL W P-5'-P-CCNC: 12 U/L (ref 1–41)
ANION GAP SERPL CALCULATED.3IONS-SCNC: 12.1 MMOL/L (ref 5–15)
APTT PPP: 36.3 SECONDS (ref 22.7–35.4)
AST SERPL-CCNC: 15 U/L (ref 1–40)
BASOPHILS # BLD AUTO: 0.04 10*3/MM3 (ref 0–0.2)
BASOPHILS NFR BLD AUTO: 0.5 % (ref 0–1.5)
BILIRUB SERPL-MCNC: 0.8 MG/DL (ref 0–1.2)
BUN SERPL-MCNC: 21 MG/DL (ref 8–23)
BUN/CREAT SERPL: 13.5 (ref 7–25)
CALCIUM SPEC-SCNC: 9.6 MG/DL (ref 8.6–10.5)
CHLORIDE SERPL-SCNC: 109 MMOL/L (ref 98–107)
CHOLEST SERPL-MCNC: 141 MG/DL (ref 0–200)
CO2 SERPL-SCNC: 18.9 MMOL/L (ref 22–29)
CREAT SERPL-MCNC: 1.55 MG/DL (ref 0.76–1.27)
DEPRECATED RDW RBC AUTO: 44.9 FL (ref 37–54)
EGFRCR SERPLBLD CKD-EPI 2021: 46.1 ML/MIN/1.73
EOSINOPHIL # BLD AUTO: 0.09 10*3/MM3 (ref 0–0.4)
EOSINOPHIL NFR BLD AUTO: 1.1 % (ref 0.3–6.2)
ERYTHROCYTE [DISTWIDTH] IN BLOOD BY AUTOMATED COUNT: 13.2 % (ref 12.3–15.4)
GLOBULIN UR ELPH-MCNC: 2.3 GM/DL
GLUCOSE BLDC GLUCOMTR-MCNC: 125 MG/DL (ref 70–130)
GLUCOSE SERPL-MCNC: 128 MG/DL (ref 65–99)
HBA1C MFR BLD: 5.4 % (ref 4.8–5.6)
HCT VFR BLD AUTO: 44.1 % (ref 37.5–51)
HDLC SERPL-MCNC: 41 MG/DL (ref 40–60)
HGB BLD-MCNC: 15 G/DL (ref 13–17.7)
HOLD SPECIMEN: NORMAL
HOLD SPECIMEN: NORMAL
IMM GRANULOCYTES # BLD AUTO: 0.05 10*3/MM3 (ref 0–0.05)
IMM GRANULOCYTES NFR BLD AUTO: 0.6 % (ref 0–0.5)
INR PPP: 1.26 (ref 0.9–1.1)
LDLC SERPL CALC-MCNC: 89 MG/DL (ref 0–100)
LDLC/HDLC SERPL: 2.2 {RATIO}
LYMPHOCYTES # BLD AUTO: 0.96 10*3/MM3 (ref 0.7–3.1)
LYMPHOCYTES NFR BLD AUTO: 11.5 % (ref 19.6–45.3)
MAGNESIUM SERPL-MCNC: 1.9 MG/DL (ref 1.6–2.4)
MCH RBC QN AUTO: 31.1 PG (ref 26.6–33)
MCHC RBC AUTO-ENTMCNC: 34 G/DL (ref 31.5–35.7)
MCV RBC AUTO: 91.5 FL (ref 79–97)
MONOCYTES # BLD AUTO: 0.56 10*3/MM3 (ref 0.1–0.9)
MONOCYTES NFR BLD AUTO: 6.7 % (ref 5–12)
NEUTROPHILS NFR BLD AUTO: 6.64 10*3/MM3 (ref 1.7–7)
NEUTROPHILS NFR BLD AUTO: 79.6 % (ref 42.7–76)
PLATELET # BLD AUTO: 120 10*3/MM3 (ref 140–450)
PMV BLD AUTO: 10.3 FL (ref 6–12)
POTASSIUM SERPL-SCNC: 3.9 MMOL/L (ref 3.5–5.2)
PROT SERPL-MCNC: 6.2 G/DL (ref 6–8.5)
PROTHROMBIN TIME: 16 SECONDS (ref 11.7–14.2)
RBC # BLD AUTO: 4.82 10*6/MM3 (ref 4.14–5.8)
SODIUM SERPL-SCNC: 140 MMOL/L (ref 136–145)
TRIGL SERPL-MCNC: 48 MG/DL (ref 0–150)
TROPONIN T SERPL HS-MCNC: 8 NG/L
VLDLC SERPL-MCNC: 11 MG/DL (ref 5–40)
WBC NRBC COR # BLD AUTO: 8.34 10*3/MM3 (ref 3.4–10.8)
WHOLE BLOOD HOLD COAG: NORMAL
WHOLE BLOOD HOLD SPECIMEN: NORMAL

## 2024-06-22 PROCEDURE — G0378 HOSPITAL OBSERVATION PER HR: HCPCS

## 2024-06-22 PROCEDURE — 85730 THROMBOPLASTIN TIME PARTIAL: CPT | Performed by: PHYSICIAN ASSISTANT

## 2024-06-22 PROCEDURE — 82948 REAGENT STRIP/BLOOD GLUCOSE: CPT

## 2024-06-22 PROCEDURE — 84484 ASSAY OF TROPONIN QUANT: CPT

## 2024-06-22 PROCEDURE — 99291 CRITICAL CARE FIRST HOUR: CPT

## 2024-06-22 PROCEDURE — 70450 CT HEAD/BRAIN W/O DYE: CPT

## 2024-06-22 PROCEDURE — 83036 HEMOGLOBIN GLYCOSYLATED A1C: CPT | Performed by: PHYSICIAN ASSISTANT

## 2024-06-22 PROCEDURE — 71045 X-RAY EXAM CHEST 1 VIEW: CPT

## 2024-06-22 PROCEDURE — 85025 COMPLETE CBC W/AUTO DIFF WBC: CPT

## 2024-06-22 PROCEDURE — 83735 ASSAY OF MAGNESIUM: CPT

## 2024-06-22 PROCEDURE — 36415 COLL VENOUS BLD VENIPUNCTURE: CPT

## 2024-06-22 PROCEDURE — 80061 LIPID PANEL: CPT | Performed by: PHYSICIAN ASSISTANT

## 2024-06-22 PROCEDURE — 93010 ELECTROCARDIOGRAM REPORT: CPT | Performed by: INTERNAL MEDICINE

## 2024-06-22 PROCEDURE — 80053 COMPREHEN METABOLIC PANEL: CPT

## 2024-06-22 PROCEDURE — 93005 ELECTROCARDIOGRAM TRACING: CPT

## 2024-06-22 PROCEDURE — 70551 MRI BRAIN STEM W/O DYE: CPT

## 2024-06-22 PROCEDURE — 85610 PROTHROMBIN TIME: CPT | Performed by: PHYSICIAN ASSISTANT

## 2024-06-22 RX ORDER — ASPIRIN 81 MG/1
324 TABLET, CHEWABLE ORAL ONCE
Status: DISCONTINUED | OUTPATIENT
Start: 2024-06-22 | End: 2024-06-22

## 2024-06-22 RX ORDER — SODIUM CHLORIDE 0.9 % (FLUSH) 0.9 %
10 SYRINGE (ML) INJECTION AS NEEDED
Status: DISCONTINUED | OUTPATIENT
Start: 2024-06-22 | End: 2024-06-23 | Stop reason: HOSPADM

## 2024-06-22 RX ORDER — UREA 10 %
2.5 LOTION (ML) TOPICAL NIGHTLY
Status: DISCONTINUED | OUTPATIENT
Start: 2024-06-22 | End: 2024-06-23 | Stop reason: HOSPADM

## 2024-06-22 RX ORDER — PRAVASTATIN SODIUM 40 MG
80 TABLET ORAL NIGHTLY
Status: DISCONTINUED | OUTPATIENT
Start: 2024-06-22 | End: 2024-06-23 | Stop reason: HOSPADM

## 2024-06-22 RX ORDER — ASPIRIN 300 MG/1
300 SUPPOSITORY RECTAL DAILY
Status: DISCONTINUED | OUTPATIENT
Start: 2024-06-22 | End: 2024-06-23

## 2024-06-22 RX ORDER — ATORVASTATIN CALCIUM 20 MG/1
40 TABLET, FILM COATED ORAL NIGHTLY
Status: DISCONTINUED | OUTPATIENT
Start: 2024-06-22 | End: 2024-06-22

## 2024-06-22 RX ORDER — ONDANSETRON 2 MG/ML
4 INJECTION INTRAMUSCULAR; INTRAVENOUS EVERY 6 HOURS PRN
Status: DISCONTINUED | OUTPATIENT
Start: 2024-06-22 | End: 2024-06-23 | Stop reason: HOSPADM

## 2024-06-22 RX ORDER — SODIUM CHLORIDE 9 MG/ML
40 INJECTION, SOLUTION INTRAVENOUS AS NEEDED
Status: DISCONTINUED | OUTPATIENT
Start: 2024-06-22 | End: 2024-06-23 | Stop reason: HOSPADM

## 2024-06-22 RX ORDER — LISINOPRIL 20 MG/1
20 TABLET ORAL
Status: DISCONTINUED | OUTPATIENT
Start: 2024-06-23 | End: 2024-06-23 | Stop reason: HOSPADM

## 2024-06-22 RX ORDER — SODIUM CHLORIDE 0.9 % (FLUSH) 0.9 %
10 SYRINGE (ML) INJECTION EVERY 12 HOURS SCHEDULED
Status: DISCONTINUED | OUTPATIENT
Start: 2024-06-22 | End: 2024-06-23 | Stop reason: HOSPADM

## 2024-06-22 RX ORDER — ASPIRIN 325 MG
325 TABLET ORAL ONCE
Status: COMPLETED | OUTPATIENT
Start: 2024-06-22 | End: 2024-06-22

## 2024-06-22 RX ORDER — ESCITALOPRAM OXALATE 10 MG/1
10 TABLET ORAL DAILY
Status: DISCONTINUED | OUTPATIENT
Start: 2024-06-22 | End: 2024-06-23 | Stop reason: HOSPADM

## 2024-06-22 RX ORDER — PANTOPRAZOLE SODIUM 40 MG/1
40 TABLET, DELAYED RELEASE ORAL
Status: DISCONTINUED | OUTPATIENT
Start: 2024-06-23 | End: 2024-06-23 | Stop reason: HOSPADM

## 2024-06-22 RX ORDER — AMLODIPINE BESYLATE 5 MG/1
5 TABLET ORAL
Status: DISCONTINUED | OUTPATIENT
Start: 2024-06-23 | End: 2024-06-23 | Stop reason: HOSPADM

## 2024-06-22 RX ORDER — METOPROLOL SUCCINATE 50 MG/1
50 TABLET, EXTENDED RELEASE ORAL DAILY
Status: DISCONTINUED | OUTPATIENT
Start: 2024-06-23 | End: 2024-06-23 | Stop reason: HOSPADM

## 2024-06-22 RX ORDER — TAMSULOSIN HYDROCHLORIDE 0.4 MG/1
0.4 CAPSULE ORAL DAILY
Status: DISCONTINUED | OUTPATIENT
Start: 2024-06-23 | End: 2024-06-23 | Stop reason: HOSPADM

## 2024-06-22 RX ORDER — ASPIRIN 325 MG
325 TABLET ORAL DAILY
Status: DISCONTINUED | OUTPATIENT
Start: 2024-06-22 | End: 2024-06-23

## 2024-06-22 RX ADMIN — ASPIRIN 325 MG: 325 TABLET ORAL at 22:42

## 2024-06-22 RX ADMIN — PRAVASTATIN SODIUM 80 MG: 40 TABLET ORAL at 22:42

## 2024-06-22 RX ADMIN — APIXABAN 2.5 MG: 2.5 TABLET, FILM COATED ORAL at 22:42

## 2024-06-22 RX ADMIN — Medication 2.5 MG: at 22:42

## 2024-06-22 RX ADMIN — ESCITALOPRAM OXALATE 10 MG: 10 TABLET, FILM COATED ORAL at 22:42

## 2024-06-22 RX ADMIN — Medication 10 ML: at 22:45

## 2024-06-22 NOTE — PROGRESS NOTES
MD ATTESTATION NOTE    The JOSE JUAN and I have discussed this patient's history, physical exam, and treatment plan.  I have reviewed the documentation and personally had a face to face interaction with the patient. I affirm the documentation and agree with the treatment and plan.  The attached note describes my personal findings.      I provided a substantive portion of the care of the patient.  I personally performed the physical exam in its entirety, and below are my findings.      Brief HPI: This is a 78-year-old gentleman that does have a history of portal vein thrombosis little over 2 years ago that is on Eliquis 2.5 mg twice daily.  He has had no other clotting disorder no history of strokes that he is aware of in the past.  He has a history of hyperlipidemia, hypertension, chronic kidney disease as well.  This morning somewhere around 10:00 he was going to  a prescription for his wife and he could not get out the words of her birthdate.  He knew what he wanted to say but he was unable to get words out.  He also felt let when he was able to talk he felt like his speech was off and maybe a little slurred.  He had no other neurologic symptoms he did not have any vision change he did not have any weakness to arms and legs.  He was able to walk and stand he actually drove to the emergency department.  Denied any pain in the head or neck.  The symptoms lasted for about 1 hour and is completely resolved at this time.  He was sent to the emergency department for further evaluation and neurologic evaluation.    General : Elderly male.  Friendly.  Patient is awake alert and oriented.  Patient is in no acute cardiovascular respiratory distress.  HEENT: NCAT.  Pupils are equal round reactive to light extraocular muscles are intact.  Patient has no new visual impairment he does have chronic visual impairment.  No visual field deficit  CV: Heart is regular rate and rhythm  Respiratory: CTA bilaterally  Abd: Soft and  nontender and obese  Ext: No acute abnormalities.  Intact distal pulses that are equal strong and symmetric.  No coolness or cyanosis or pallor  Skin: No rash  Neuro: Cranial nerves II through XII grossly intact as tested.  No acute lateralizing deficits.  NIH stroke scale is 0 on my evaluation  Psych: Normal mood and affect    I have reviewed the patient's vital signs, lab work, EKG and imaging.    Plan:  1.  Transient speech change.  This seems to be concerning for potential TIA with speech impairment.  He is on Eliquis 2.5 mg twice daily.  Currently his symptoms have 100% resolved and has a normal neurologic exam at this time.  There is been communication with the stroke neurologist and he will come and evaluate the patient.  So far his workup has been unremarkable including a CT scan of his head and lab work.  Will can order an MRI of the brain.  Patient did receive aspirin.  Informed patient of the initial test that we will order and my clinical concerns.  All questions answered at this time.        Note Disclaimer: At [facility], we believe that sharing information builds trust and better relationships. You are receiving this note because you recently visited [facility]. It is possible you will see health information before a provider has talked with you about it. This kind of information can be easy to misunderstand. To help you fully understand what it means for your health, we urge you to discuss this note with your provider.

## 2024-06-22 NOTE — ED NOTES
Pt to Er from home via [facility].  Per report pt has been having headache since noon and had an episode of forgetfulness while at [facility]. Pt states he knew he was there to  wife's prescription but was unable to remember her name/name of medication or get his words out. C-stat negative for EMS.    Pt was given norco by family member prior to EMS arrival and has been experiencing nausea and vomiting since.

## 2024-06-22 NOTE — ED NOTES
Nursing report ED to floor  Timi Wolf  78 y.o.  male     Timi Wolf is a 78 y.o. male who presents to the ED c/o acute headache.  Reports having a rather abrupt onset of a frontal headache that is sharp and severe.  It began around 11 AM at the earliest when he went to go get a prescription for his wife at the pharmacy.  He could not member her birthday or his birthday.  No history of similar.  He feels that his symptoms lasted for a couple hours but he is now doing better in terms of the forgetfulness but still has the headache.     HPI :  HPI (Adult)  Stated Reason for Visit: headache, difficulty finding words/getting words out, vomiting  History Obtained From: EMS, patient    Chief Complaint  Chief Complaint   Patient presents with    Headache    Vomiting    Speech problem       Admitting doctor:   Favian Briceno MD    Admitting diagnosis:   There were no encounter diagnoses.    Code status:   Current Code Status       Date Active Code Status Order ID Comments User Context       6/22/2024 1730 CPR (Attempt to Resuscitate) [id number]  Riki Tatum III, PA ED        Question Answer    Code Status (Patient has no pulse and is not breathing) CPR (Attempt to Resuscitate)    Medical Interventions (Patient has pulse or is breathing) Full Support    Level Of Support Discussed With Patient                    Allergies:   Sulfamethoxazole-trimethoprim    Isolation:   No active isolations    Intake and Output  No intake or output data in the 24 hours ending 06/22/24 1744    Weight:   There were no vitals filed for this visit.    Most recent vitals:   Vitals:    06/22/24 1546 06/22/24 1601 06/22/24 1701 06/22/24 1731   BP:  166/83 147/76 141/79   Pulse: 68 74 63 73   Resp:       Temp:       SpO2: 95% 95% 92% 93%       Active LDAs/IV Access:   Lines, Drains & Airways       Active LDAs       Name Placement date Placement time Site Days    Peripheral IV 06/22/24 1422 Left;Posterior Forearm 06/22/24  1422   Forearm  less than 1                    Labs (abnormal labs have a star):   Labs Reviewed   COMPREHENSIVE METABOLIC PANEL - Abnormal; Notable for the following components:       Result Value    Glucose 128 (*)     Creatinine 1.55 (*)     Chloride 109 (*)     CO2 18.9 (*)     eGFR 46.1 (*)     All other components within normal limits    Narrative:     GFR Normal >60  Chronic Kidney Disease <60  Kidney Failure <15    The GFR formula is only valid for adults with stable renal function between ages 18 and 70.   CBC WITH AUTO DIFFERENTIAL - Abnormal; Notable for the following components:    Platelets 120 (*)     Neutrophil % 79.6 (*)     Lymphocyte % 11.5 (*)     Immature Grans % 0.6 (*)     All other components within normal limits   SINGLE HS TROPONIN T - Normal    Narrative:     High Sensitive Troponin T Reference Range:  <14.0 ng/L- Negative Female for AMI  <22.0 ng/L- Negative Male for AMI  >=14 - Abnormal Female indicating possible myocardial injury.  >=22 - Abnormal Male indicating possible myocardial injury.   Clinicians would have to utilize clinical acumen, EKG, Troponin, and serial changes to determine if it is an Acute Myocardial Infarction or myocardial injury due to an underlying chronic condition.        MAGNESIUM - Normal   POCT GLUCOSE FINGERSTICK - Normal   RAINBOW DRAW    Narrative:     The following orders were created for panel order [place] Draw.  Procedure                               Abnormality         Status                     ---------                               -----------         ------                     Green Top (Gel)[[id number]]                                  Final result               Lavender Top[[id number]]                                     Final result               Gold Top - SST[[id number]]                                   Final result               Light Blue Top[[id number]]                                   Final result                 Please view results for these tests  on the individual orders.   URINALYSIS W/ MICROSCOPIC IF INDICATED (NO CULTURE)   HEMOGLOBIN A1C   LIPID PANEL   APTT   PROTIME-INR   POCT GLUCOSE FINGERSTICK   POCT GLUCOSE FINGERSTICK   POCT GLUCOSE FINGERSTICK   POCT GLUCOSE FINGERSTICK   POCT GLUCOSE FINGERSTICK   CBC AND DIFFERENTIAL    Narrative:     The following orders were created for panel order CBC & Differential.  Procedure                               Abnormality         Status                     ---------                               -----------         ------                     CBC Auto Differential[[id number]]        Abnormal            Final result                 Please view results for these tests on the individual orders.   GREEN TOP   LAVENDER TOP   GOLD TOP - SST   LIGHT BLUE TOP       EKG:   ECG 12 Lead ED Triage Standing Order; Weak / Dizzy / AMS   Preliminary Result   HEART RATE= 72  bpm   RR Interval= 833  ms   WY Interval= 226  ms   P Horizontal Axis= 240  deg   P Front Axis= 24  deg   QRSD Interval= 99  ms   QT Interval= 423  ms   QTcB= 463  ms   QRS Axis= -35  deg   T Wave Axis= 21  deg   - ABNORMAL ECG -   Sinus rhythm   Prolonged WY interval   Probable left atrial enlargement   Inferior infarct, old   Anterior infarct, old   Electronically Signed By:    Date and Time of Study: 2024-06-22 14:29:48          Meds given in ED:   Medications   sodium chloride 0.9 % flush 10 mL (has no administration in time range)   sodium chloride 0.9 % flush 10 mL (has no administration in time range)   sodium chloride 0.9 % flush 10 mL (has no administration in time range)   sodium chloride 0.9 % infusion 40 mL (has no administration in time range)   atorvastatin (LIPITOR) tablet 40 mg (has no administration in time range)   ondansetron (ZOFRAN) injection 4 mg (has no administration in time range)   aspirin tablet 325 mg (325 mg Oral Not Given 6/22/24 1741)     Or   aspirin suppository 300 mg ( Rectal Not Given:  See Alt 6/22/24 1741)       Imaging  results:  CT Head Without Contrast    Result Date: 6/22/2024   No CT evidence for acute intracranial pathology.   Radiation dose reduction techniques were utilized, including automated exposure control and exposure modulation based on body size.  This report was finalized on 6/22/2024 3:58 PM by Dr. Mike Alcala M.D on Workstation: [id number]       Ambulatory status:   - supervision    Social issues:   Social History     Socioeconomic History    Marital status:      Spouse name: Amarilys   Tobacco Use    Smoking status: Former     Current packs/day: 1.00     Types: Cigarettes    Smokeless tobacco: Never    Tobacco comments:     Quit over 30 years ago   Substance and Sexual Activity    Alcohol use: No    Drug use: No    Sexual activity: Defer       Peripheral Neurovascular  Peripheral Neurovascular (Adult)  Peripheral Neurovascular WDL: WDL    Neuro Cognitive  Neuro Cognitive (Adult)  Cognitive/Neuro/Behavioral WDL: .WDL except  Orientation: oriented x 4  Last Known Well Date: 06/22/24  Last Known Well Time: 1200  Memory Deficit: short-term loss  Additional Documentation: Last Known Well Date (Row), Last Known Well Time (Row), Dizziness Assessment (Group), Memory Deficit (Row), NIH Stroke Scale (group)  Headache Assessment  Headache Location: temporal  Severity Rating (0-10): 6  Description/Character: throbbing  Associated Signs/Symptoms: dizziness, weakness  Pupils  Pupil PERRLA: yes  NIH Stroke Scale  Interval: baseline  1a. Level of Consciousness: 0-->Alert, keenly responsive  1b. LOC Questions: 0-->Answers both questions correctly  1c. LOC Commands: 0-->Performs both tasks correctly  2. Best Gaze: 0-->Normal  3. Visual: 0-->No visual loss  4. Facial Palsy: 0-->Normal symmetrical movements  5a. Motor Arm, Left: 0-->No drift, limb holds 90 (or 45) degrees for full 10 secs  5b. Motor Arm, Right: 0-->No drift, limb holds 90 (or 45) degrees for full 10 secs  6a. Motor Leg, Left: 0-->No drift, leg holds 30  degree position for full 5 secs  6b. Motor Leg, Right: 0-->No drift, leg holds 30 degree position for full 5 secs  7. Limb Ataxia: 0-->Absent  8. Sensory: 0-->Normal, no sensory loss  9. Best Language: 0-->No aphasia, normal  10. Dysarthria: 0-->Normal  11. Extinction and Inattention (formerly Neglect): 0-->No abnormality  Total (NIH Stroke Scale): 0  Dizziness Assessment  Dizziness Reported Symptoms: intermittent  Dizziness Occurs With: no activity    Learning  Learning Assessment (Adult)  Learning Readiness and Ability: no barriers identified  Education Provided  Person Taught: patient  Teaching Method: verbal instruction  Teaching Focus: symptom/problem overview  Education Outcome Evaluation: eager to learn, acceptance expressed, verbalizes understanding, needs reinforcement    Respiratory  Respiratory (Adult)  Airway WDL: WDL  Respiratory WDL  Respiratory WDL: WDL, rhythm/pattern  Rhythm/Pattern, Respiratory: no shortness of breath reported, depth regular, pattern regular, unlabored    Abdominal Pain       Pain Assessments  Pain (Adult)  (0-10) Pain Rating: Rest: 6  Pain Location: head    NIH Stroke Scale  NIH Stroke Scale  Interval: baseline  1a. Level of Consciousness: 0-->Alert, keenly responsive  1b. LOC Questions: 0-->Answers both questions correctly  1c. LOC Commands: 0-->Performs both tasks correctly  2. Best Gaze: 0-->Normal  3. Visual: 0-->No visual loss  4. Facial Palsy: 0-->Normal symmetrical movements  5a. Motor Arm, Left: 0-->No drift, limb holds 90 (or 45) degrees for full 10 secs  5b. Motor Arm, Right: 0-->No drift, limb holds 90 (or 45) degrees for full 10 secs  6a. Motor Leg, Left: 0-->No drift, leg holds 30 degree position for full 5 secs  6b. Motor Leg, Right: 0-->No drift, leg holds 30 degree position for full 5 secs  7. Limb Ataxia: 0-->Absent  8. Sensory: 0-->Normal, no sensory loss  9. Best Language: 0-->No aphasia, normal  10. Dysarthria: 0-->Normal  11. Extinction and Inattention  (formerly Neglect): 0-->No abnormality  Total (NIH Stroke Scale): 0    Carlos Wolf RN  06/22/24 17:44 EDT

## 2024-06-22 NOTE — ED PROVIDER NOTES
EMERGENCY DEPARTMENT ENCOUNTER    Room Number:  24/24  PCP: Stanley Ruiz MD    HPI:  Chief Complaint: Headache  A complete HPI/ROS/PMH/PSH/SH/FH are unobtainable due to: None  Context: Timi Wolf is a 78 y.o. male who presents to the ED c/o acute headache.  Reports having a rather abrupt onset of a frontal headache that is sharp and severe.  It began around 11 AM at the earliest when he went to go get a prescription for his wife at the pharmacy.  He could not member her birthday or his birthday.  No history of similar.  He feels that his symptoms lasted for a couple hours but he is now doing better in terms of the forgetfulness but still has the headache.        PAST MEDICAL HISTORY  Active Ambulatory Problems     Diagnosis Date Noted    Chronic coronary artery disease 06/18/2016    Hyperlipidemia 06/18/2016    Hypertension 06/18/2016    Interstitial cystitis (chronic) without hematuria 01/09/2017    Prostate enlargement 01/09/2017    Epigastric pain 03/22/2021    Portal vein thrombosis 03/22/2021    Hypercoagulable state 03/22/2021    CKD (chronic kidney disease) 3a 03/22/2021    Thrombocytopenia 04/16/2021    Dysphagia 05/26/2022    Heartburn 05/26/2022    Factor 5 Leiden mutation, heterozygous 06/22/2022    Upper respiratory tract infection due to COVID-19 virus 08/19/2022    Impaired fasting glucose 09/21/2022    Current use of long term anticoagulation 02/23/2023    Reactive depression 04/04/2023     Resolved Ambulatory Problems     Diagnosis Date Noted    No Resolved Ambulatory Problems     Past Medical History:   Diagnosis Date    Abdominal pain     Arteriosclerotic cardiovascular disease     Benign prostatic hyperplasia     Cataract     Diverticulosis     GERD (gastroesophageal reflux disease)     H/O Interstitial cystitis     HL (hearing loss)     Internal hemorrhoids     Pulmonary embolism     Renal cyst, right     Umbilical hernia          PAST SURGICAL HISTORY  Past Surgical History:   Procedure  Laterality Date    ACHILLES TENDON REPAIR      ruptured tendon    COLONOSCOPY N/A     Dr Alvarado/[facility]    ENDOSCOPY      ENDOSCOPY N/A 2022    Procedure: ESOPHAGOGASTRODUODENOSCOPY with biopsy, wilson dilation;  Surgeon: Noel Alvarado MD;  Location: [facility] ENDOSCOPY;  Service: Gastroenterology;  Laterality: N/A;  hiatal hernia, gastritis    SHOULDER SURGERY      TONSILLECTOMY      UPPER GASTROINTESTINAL ENDOSCOPY           FAMILY HISTORY  Family History   Problem Relation Age of Onset    Heart attack Father     Heart disease Father          at 47    Hypertension Mother         Still living   1926 (96 yrs. Old)         SOCIAL HISTORY  Social History     Socioeconomic History    Marital status:      Spouse name: Amarilys   Tobacco Use    Smoking status: Former     Current packs/day: 1.00     Types: Cigarettes    Smokeless tobacco: Never    Tobacco comments:     Quit over 30 years ago   Substance and Sexual Activity    Alcohol use: No    Drug use: No    Sexual activity: Defer         ALLERGIES  Sulfamethoxazole-trimethoprim        REVIEW OF SYSTEMS  Review of Systems     Included in HPI  All systems reviewed and negative except for those discussed in HPI.       PHYSICAL EXAM  ED Triage Vitals [24 1410]   Temp Heart Rate Resp BP SpO2   98 °F (36.7 °C) 64 20 (!) 186/72 96 %      Temp src Heart Rate Source Patient Position BP Location FiO2 (%)   -- -- -- -- --       Physical Exam      GENERAL: no acute distress  HENT: nares patent  EYES: no scleral icterus  CV: regular rhythm, normal rate  RESPIRATORY: normal effort, clear to auscultation bilaterally  ABDOMEN: soft  MUSCULOSKELETAL: no deformity  NEURO:   Recent and remote memory functions are normal. The patient is attentive with normal concentration. Language is fluent. Speech is clear. The speech is non-dysarthric. Fund of knowledge is normal.   Symmetric smile with no facial droop.  Eyes close shut strongly  bilaterally.  Symmetric eyebrow raise bilaterally.  EOMI, PERRL  CN II-XII grossly normal otherwise.  5/5 strength to extremities.  No pronator drift.  Intact FNF.  No meningismus.  NIHSS 0  PSYCH:  calm, cooperative  SKIN: warm, dry    Vital signs and nursing notes reviewed.          LAB RESULTS  Recent Results (from the past 24 hour(s))   Comprehensive Metabolic Panel    Collection Time: 06/22/24  2:22 PM    Specimen: Blood   Result Value Ref Range    Glucose 128 (H) 65 - 99 mg/dL    BUN 21 8 - 23 mg/dL    Creatinine 1.55 (H) 0.76 - 1.27 mg/dL    Sodium 140 136 - 145 mmol/L    Potassium 3.9 3.5 - 5.2 mmol/L    Chloride 109 (H) 98 - 107 mmol/L    CO2 18.9 (L) 22.0 - 29.0 mmol/L    Calcium 9.6 8.6 - 10.5 mg/dL    Total Protein 6.2 6.0 - 8.5 g/dL    Albumin 3.9 3.5 - 5.2 g/dL    ALT (SGPT) 12 1 - 41 U/L    AST (SGOT) 15 1 - 40 U/L    Alkaline Phosphatase 67 39 - 117 U/L    Total Bilirubin 0.8 0.0 - 1.2 mg/dL    Globulin 2.3 gm/dL    A/G Ratio 1.7 g/dL    BUN/Creatinine Ratio 13.5 7.0 - 25.0    Anion Gap 12.1 5.0 - 15.0 mmol/L    eGFR 46.1 (L) >60.0 mL/min/1.73   Single High Sensitivity Troponin T    Collection Time: 06/22/24  2:22 PM    Specimen: Blood   Result Value Ref Range    HS Troponin T 8 <22 ng/L   Magnesium    Collection Time: 06/22/24  2:22 PM    Specimen: Blood   Result Value Ref Range    Magnesium 1.9 1.6 - 2.4 mg/dL   Green Top (Gel)    Collection Time: 06/22/24  2:22 PM   Result Value Ref Range    Extra Tube Hold for add-ons.    Lavender Top    Collection Time: 06/22/24  2:22 PM   Result Value Ref Range    Extra Tube hold for add-on    Gold Top - SST    Collection Time: 06/22/24  2:22 PM   Result Value Ref Range    Extra Tube Hold for add-ons.    Light Blue Top    Collection Time: 06/22/24  2:22 PM   Result Value Ref Range    Extra Tube Hold for add-ons.    CBC Auto Differential    Collection Time: 06/22/24  2:22 PM    Specimen: Blood   Result Value Ref Range    WBC 8.34 3.40 - 10.80 10*3/mm3    RBC  4.82 4.14 - 5.80 10*6/mm3    Hemoglobin 15.0 13.0 - 17.7 g/dL    Hematocrit 44.1 37.5 - 51.0 %    MCV 91.5 79.0 - 97.0 fL    MCH 31.1 26.6 - 33.0 pg    MCHC 34.0 31.5 - 35.7 g/dL    RDW 13.2 12.3 - 15.4 %    RDW-SD 44.9 37.0 - 54.0 fl    MPV 10.3 6.0 - 12.0 fL    Platelets 120 (L) 140 - 450 10*3/mm3    Neutrophil % 79.6 (H) 42.7 - 76.0 %    Lymphocyte % 11.5 (L) 19.6 - 45.3 %    Monocyte % 6.7 5.0 - 12.0 %    Eosinophil % 1.1 0.3 - 6.2 %    Basophil % 0.5 0.0 - 1.5 %    Immature Grans % 0.6 (H) 0.0 - 0.5 %    Neutrophils, Absolute 6.64 1.70 - 7.00 10*3/mm3    Lymphocytes, Absolute 0.96 0.70 - 3.10 10*3/mm3    Monocytes, Absolute 0.56 0.10 - 0.90 10*3/mm3    Eosinophils, Absolute 0.09 0.00 - 0.40 10*3/mm3    Basophils, Absolute 0.04 0.00 - 0.20 10*3/mm3    Immature Grans, Absolute 0.05 0.00 - 0.05 10*3/mm3   ECG 12 Lead ED Triage Standing Order; Weak / Dizzy / AMS    Collection Time: 06/22/24  2:29 PM   Result Value Ref Range    QT Interval 423 ms    QTC Interval 463 ms   POC Glucose Once    Collection Time: 06/22/24  2:30 PM    Specimen: Blood   Result Value Ref Range    Glucose 125 70 - 130 mg/dL       Ordered the above labs and reviewed the results.        RADIOLOGY  XR Chest 1 View    Result Date: 6/22/2024  XR CHEST 1 VW-  Clinical: Acute mental status change  COMPARISON examination 12/20/2023  FINDINGS: Shallow inspiratory effort with some crowding of the bronchovascular markings, atelectasis or infiltrate at the left lung base. The cardiomediastinal silhouette is stable. The right lung is clear. No vascular congestion is demonstrated. The remainder is unremarkable.  This report was finalized on 6/22/2024 2:42 PM by Dr. Pardeep Garcia M.D on Workstation: [id number]       Ordered the above noted radiological studies. Reviewed by me in PACS.        MEDICATIONS GIVEN IN ER  Medications   sodium chloride 0.9 % flush 10 mL (has no administration in time range)         ORDERS PLACED DURING THIS VISIT:  Orders Placed  This Encounter   Procedures    XR Chest 1 View    CT Head Without Contrast    [place] Draw    Comprehensive Metabolic Panel    Single High Sensitivity Troponin T    Magnesium    Urinalysis With Microscopic If Indicated (No Culture) - Urine, Clean Catch    CBC Auto Differential    NPO Diet NPO Type: Strict NPO    Undress & Gown    Continuous Pulse Oximetry    Vital Signs    Orthostatic Blood Pressure    Oxygen Therapy- Nasal Cannula; Titrate 1-6 LPM Per SpO2; 90 - 95%    POC Glucose Once    POC Glucose Once    ECG 12 Lead ED Triage Standing Order; Weak / Dizzy / AMS    Insert Peripheral IV    Fall Precautions    CBC & Differential    Green Top (Gel)    Lavender Top    Gold Top - SST    Light Blue Top         OUTPATIENT MEDICATION MANAGEMENT:  Current Facility-Administered Medications Ordered in Epic   Medication Dose Route Frequency Provider Last Rate Last Admin    sodium chloride 0.9 % flush 10 mL  10 mL Intravenous PRN Emergency, Triage Protocol, MD         Current Outpatient Medications Ordered in Epic   Medication Sig Dispense Refill    amLODIPine-benazepril (LOTREL 5-20) 5-20 MG per capsule TAKE 1 CAPSULE BY MOUTH DAILY 90 capsule 3    apixaban (ELIQUIS) 2.5 MG tablet tablet Take 1 tablet by mouth 2 (Two) Times a Day. 180 tablet 3    Coenzyme Q10 150 MG capsule Take 1 tablet by mouth Every Night.      escitalopram (LEXAPRO) 10 MG tablet TAKE 1 TABLET BY MOUTH DAILY 90 tablet 3    Melatonin 3 MG capsule Take 1 capsule by mouth Every Night.      metoprolol succinate XL (TOPROL-XL) 50 MG 24 hr tablet TAKE 1 TABLET BY MOUTH DAILY 90 tablet 3    pantoprazole (PROTONIX) 40 MG EC tablet TAKE 1 TABLET BY MOUTH DAILY 90 tablet 0    pravastatin (PRAVACHOL) 80 MG tablet TAKE 1 TABLET BY MOUTH AT NIGHT 90 tablet 3    tamsulosin (FLOMAX) 0.4 MG capsule 24 hr capsule TAKE 1 CAPSULE BY MOUTH DAILY 90 capsule 3       PROCEDURES  Critical Care    Performed by: Jesus Helton II, MD  Authorized by: Jesus Helton II,  MD    Critical care provider statement:     Critical care time (minutes):  30    Critical care was necessary to treat or prevent imminent or life-threatening deterioration of the following conditions:  CNS failure or compromise    Critical care was time spent personally by me on the following activities:  Ordering and review of laboratory studies, ordering and review of radiographic studies, pulse oximetry, discussions with consultants, examination of patient, obtaining history from patient or surrogate and re-evaluation of patient's condition            MEDICAL DECISION MAKING, PROGRESS, and CONSULTS    Discussion below represents my analysis of pertinent findings related to patient's condition, differential diagnosis, treatment plan and final disposition.                Differential diagnosis:    Intracranial hemorrhage, stroke, TIA, complicated migraine    Patient is not a candidate for TNK due to NIH stroke scale of 0.  Furthermore, he is on Eliquis with last dose this morning.                 Independent interpretation of labs, radiology studies, and discussions with consultants:  ED Course as of 06/22/24 1452   Sat Jun 22, 2024   1444 EKG independently interpreted by myself.  Time 2:29 PM.  Sinus rhythm.  Heart 72.  First-degree AV block.  Left atrial normality.  No acute ST abnormality.  Low voltage in the precordial leads.  Prolonged R wave progression. [TD]      ED Course User Index  [TD] Jesus Helton II, MD         CT head acutely negative.    I discussed the case with Dr. Prather, stroke neuro. Recommends CTA and MRI w/wo.     I discussed with NIK Kyle who will admit.        DIAGNOSIS  Final diagnoses:   Transient global amnesia   Frontal headache         DISPOSITION  Admit      Latest Documented Vital Signs:  As of 14:52 EDT  BP- 158/95 HR- 71 Temp- 98 °F (36.7 °C) O2 sat- 91%      --    Please note that portions of this were completed with a voice recognition program.       Note  Disclaimer: At [facility], we believe that sharing information builds trust and better relationships. You are receiving this note because you are receiving care at [facility] or recently visited. It is possible you will see health information before a provider has talked with you about it. This kind of information can be easy to misunderstand. To help you fully understand what it means for your health, we urge you to discuss this note with your provider.         Jesus Helton II, MD  06/22/24 2641       Jesus Helton II, MD  06/22/24 [id number]

## 2024-06-22 NOTE — H&P
[facility]   HISTORY AND PHYSICAL    Patient Name: Timi Wolf  : 1945  MRN: [id number]  Primary Care Physician:  Stanley Ruiz MD  Date of admission: 2024    Subjective   Subjective     Chief Complaint:   Chief Complaint   Patient presents with    Headache    Vomiting    Speech problem         HPI:    Timi Wolf is a 78 y.o. male with significant past medical history of atherosclerotic cardiovascular disease, BPH, PE, hypertension, hyperlipidemia on Eliquis for a past portal vein clot who presented to the ED complaining of headache and expressive aphasia.  Patient reports there was a rather abrupt onset of expressive aphasia.  Symptoms began roughly 7 hours ago at 11 AM.  Patient reports he went to fill a prescription for his wife at the pharmacy, knew her birthday was 1946 but could not seem to get it out when speaking with the pharmacist.  He states he was confused from baseline and continued to have trouble expressing words and thoughts that lasted for roughly a few hours.  Symptoms slowly improved and the patient states he now feels he is back to baseline.  There is no ataxia, visual disturbance, numbness, tingling, weakness in extremities or face.  He reports the headache continues but is very mild.  The headache was said to be in the vicinity of the left frontal sinus and above the left eye and radiate across the forehead to the right.  Patient's case was discussed by the ED physician with a neurologist and it was felt the patient would be better served by admission to the ED observation unit for further evaluation.    In the patient's ED workup he was assessed and NIHSS score 0.  Speech and thought process are said to be clear.  Strength and gross sensation intact globally.  No drift or ataxia.  Chest x-ray showed no acute process.  High-sensitivity troponin was 8.  Sodium 140, potassium 3.9, CO2 18.9, anion gap 12.1, creatinine 1.55 which appears to have been the same  on 3/5/2024.  Glucose 128, transaminases and T. bili levels normal.  WBC 8.34, hemoglobin 15, platelets 120 which appears baseline.  CT head without contrast showed no acute process.  EKG showed a QT interval of 423 and a QTc of 463.  The ED physician noted, first-degree AV block, recorded heart rate 72/sinus no acute ST abnormality.  Prolonged R wave progression.    Review of Systems   All systems were reviewed and negative except for: That listed above.    Personal History     Past Medical History:   Diagnosis Date    Abdominal pain     Arteriosclerotic cardiovascular disease     Benign prostatic hyperplasia     Cataract     Diverticulosis     Dysphagia     GERD (gastroesophageal reflux disease)     H/O Interstitial cystitis     HL (hearing loss)     Hyperlipidemia     Hypertension     Internal hemorrhoids     Prostate enlargement     Pulmonary embolism     Renal cyst, right     Umbilical hernia        Past Surgical History:   Procedure Laterality Date    ACHILLES TENDON REPAIR      ruptured tendon    COLONOSCOPY N/A 2012    Dr Alvarado/[facility]    ENDOSCOPY      ENDOSCOPY N/A 09/29/2022    Procedure: ESOPHAGOGASTRODUODENOSCOPY with biopsy, wilson dilation;  Surgeon: Noel Alvarado MD;  Location: [facility] ENDOSCOPY;  Service: Gastroenterology;  Laterality: N/A;  hiatal hernia, gastritis    SHOULDER SURGERY      TONSILLECTOMY      UPPER GASTROINTESTINAL ENDOSCOPY  2022       Family History: family history includes Heart attack in his father; Heart disease in his father; Hypertension in his mother. Otherwise pertinent FHx was reviewed and not pertinent to current issue.    Social History:  reports that he has quit smoking. His smoking use included cigarettes. He has never used smokeless tobacco. He reports that he does not drink alcohol and does not use drugs.    Home Medications:  Coenzyme Q10, Melatonin, amLODIPine-benazepril, apixaban, escitalopram, metoprolol succinate XL, pantoprazole, pravastatin, and  tamsulosin    Allergies:  Allergies   Allergen Reactions    Sulfamethoxazole-Trimethoprim Itching     Abdominal pain as well        Objective   Objective     Vitals:   Temp:  [97.9 °F (36.6 °C)-98 °F (36.7 °C)] 97.9 °F (36.6 °C)  Heart Rate:  [63-89] 83  Resp:  [20-22] 22  BP: (141-188)/(72-95) 188/83  Physical Exam    Constitutional: Awake, alert   Eyes: PERRLA, sclerae anicteric, no conjunctival injection   HENT: NCAT, mucous membranes moist   Neck: Supple, no thyromegaly, no lymphadenopathy, trachea midline   Respiratory: Clear to auscultation bilaterally, nonlabored respirations    Cardiovascular: RRR, no murmurs, rubs, or gallops, palpable pedal pulses bilaterally   Gastrointestinal: Positive bowel sounds, soft, nontender, nondistended   Musculoskeletal: No bilateral ankle edema, no clubbing or cyanosis to extremities   Psychiatric: Appropriate affect, cooperative   Neurologic: Oriented x 3, strength symmetric in all extremities, Cranial Nerves grossly intact to confrontation, speech clear   Skin: No rashes     Result Review    Result Review:  I have personally reviewed the results from the time of this admission to 6/22/2024 18:56 EDT and agree with these findings:  [x]  Laboratory list / accordion  []  Microbiology  [x]  Radiology  [x]  EKG/Telemetry   []  Cardiology/Vascular   []  Pathology  []  Old records  []  Other:        Assessment & Plan   Assessment / Plan     Brief Patient Summary:  Timi Wolf is a 78 y.o. male who on Eliquis for past portal vein thrombus.  Patient had a abrupt onset of aphasia while picking up meds for his wife at the pharmacy earlier today.  The aphasia was said to continue for roughly 2 hours and gradually improved.  Patient experienced no other strokelike symptoms that he can recall.  NIH was 0 in the ED.  He was admitted to the ED observation unit for further evaluation.    Active Hospital Problems:  Active Hospital Problems    Diagnosis     **Amnesia      Plan:      Aphasia/confusion  -MRI brain without contrast  -Neuro following  -TTE echo  -Serial neurochecks  -Continue Eliquis    Hypertension  -Continue home amlodipine  -Continue home metoprolol    Hyperlipidemia  -Lipitor 40 mg p.o.  -Hold home pravastatin          VTE Prophylaxis:  Mechanical VTE prophylaxis orders are present.        CODE STATUS:    Level Of Support Discussed With: Patient  Code Status (Patient has no pulse and is not breathing): CPR (Attempt to Resuscitate)  Medical Interventions (Patient has pulse or is breathing): Full Support    Admission Status:  I believe this patient meets observation status.    Electronically signed by Riki Tatum III, PA, 06/22/24, 5:43 PM EDT.        75 minutes has been spent by [facility] providers in the care of this patient while under observation status      I have worn appropriate PPE during this patient encounter, sanitized my hands both with entering and exiting patient's room.    I have discussed plan of care with patient including advance care plan and/or surrogate decision maker.  Patient advises that their spouse/Amarilys Wolf will be their primary surrogate decision maker

## 2024-06-23 ENCOUNTER — APPOINTMENT (OUTPATIENT)
Dept: CARDIOLOGY | Facility: HOSPITAL | Age: 79
End: 2024-06-23
Payer: MEDICARE

## 2024-06-23 ENCOUNTER — READMISSION MANAGEMENT (OUTPATIENT)
Dept: CALL CENTER | Facility: HOSPITAL | Age: 79
End: 2024-06-23
Payer: MEDICARE

## 2024-06-23 VITALS
OXYGEN SATURATION: 91 % | BODY MASS INDEX: 28.56 KG/M2 | TEMPERATURE: 98.6 F | DIASTOLIC BLOOD PRESSURE: 78 MMHG | HEIGHT: 71 IN | SYSTOLIC BLOOD PRESSURE: 144 MMHG | WEIGHT: 204 LBS | HEART RATE: 75 BPM | RESPIRATION RATE: 18 BRPM

## 2024-06-23 LAB
ALBUMIN SERPL-MCNC: 3.7 G/DL (ref 3.5–5.2)
ALBUMIN/GLOB SERPL: 1.5 G/DL
ALP SERPL-CCNC: 62 U/L (ref 39–117)
ALT SERPL W P-5'-P-CCNC: 11 U/L (ref 1–41)
ANION GAP SERPL CALCULATED.3IONS-SCNC: 10.9 MMOL/L (ref 5–15)
AORTIC ARCH: 2.7 CM
AORTIC DIMENSIONLESS INDEX: 0.9 (DI)
ASCENDING AORTA: 3.6 CM
AST SERPL-CCNC: 14 U/L (ref 1–40)
AV HCM GRAD VALS: 40 MMHG
AV LVOT PEAK GRADIENT: 11 MMHG
BACTERIA UR QL AUTO: NORMAL /HPF
BH CV ECHO MEAS - ACS: 1.61 CM
BH CV ECHO MEAS - AO MAX PG: 13.2 MMHG
BH CV ECHO MEAS - AO MEAN PG: 8 MMHG
BH CV ECHO MEAS - AO ROOT DIAM: 3.9 CM
BH CV ECHO MEAS - AO V2 MAX: 182 CM/SEC
BH CV ECHO MEAS - AO V2 VTI: 40.8 CM
BH CV ECHO MEAS - AVA(I,D): 2.9 CM2
BH CV ECHO MEAS - EDV(CUBED): 117.6 ML
BH CV ECHO MEAS - EDV(MOD-SP2): 84 ML
BH CV ECHO MEAS - EDV(MOD-SP4): 131 ML
BH CV ECHO MEAS - EF(MOD-BP): 71.2 %
BH CV ECHO MEAS - EF(MOD-SP2): 71.4 %
BH CV ECHO MEAS - EF(MOD-SP4): 71.8 %
BH CV ECHO MEAS - ESV(CUBED): 30.9 ML
BH CV ECHO MEAS - ESV(MOD-SP2): 24 ML
BH CV ECHO MEAS - ESV(MOD-SP4): 37 ML
BH CV ECHO MEAS - FS: 35.9 %
BH CV ECHO MEAS - IVS/LVPW: 1.09 CM
BH CV ECHO MEAS - IVSD: 1.2 CM
BH CV ECHO MEAS - LAT PEAK E' VEL: 6.4 CM/SEC
BH CV ECHO MEAS - LV DIASTOLIC VOL/BSA (35-75): 61.6 CM2
BH CV ECHO MEAS - LV MASS(C)D: 213.3 GRAMS
BH CV ECHO MEAS - LV MAX PG: 10 MMHG
BH CV ECHO MEAS - LV MEAN PG: 6 MMHG
BH CV ECHO MEAS - LV SYSTOLIC VOL/BSA (12-30): 17.4 CM2
BH CV ECHO MEAS - LV V1 MAX: 158 CM/SEC
BH CV ECHO MEAS - LV V1 VTI: 37.2 CM
BH CV ECHO MEAS - LVIDD: 4.9 CM
BH CV ECHO MEAS - LVIDS: 3.1 CM
BH CV ECHO MEAS - LVOT AREA: 3.2 CM2
BH CV ECHO MEAS - LVOT DIAM: 2.03 CM
BH CV ECHO MEAS - LVPWD: 1.1 CM
BH CV ECHO MEAS - MED PEAK E' VEL: 6.2 CM/SEC
BH CV ECHO MEAS - MV A DUR: 0.11 SEC
BH CV ECHO MEAS - MV A MAX VEL: 88.8 CM/SEC
BH CV ECHO MEAS - MV DEC SLOPE: 270.9 CM/SEC2
BH CV ECHO MEAS - MV DEC TIME: 0.25 SEC
BH CV ECHO MEAS - MV E MAX VEL: 81.4 CM/SEC
BH CV ECHO MEAS - MV E/A: 0.92
BH CV ECHO MEAS - MV MAX PG: 2.9 MMHG
BH CV ECHO MEAS - MV MEAN PG: 1.16 MMHG
BH CV ECHO MEAS - MV P1/2T: 96.8 MSEC
BH CV ECHO MEAS - MV V2 VTI: 33 CM
BH CV ECHO MEAS - MVA(P1/2T): 2.27 CM2
BH CV ECHO MEAS - MVA(VTI): 3.6 CM2
BH CV ECHO MEAS - PA ACC TIME: 0.16 SEC
BH CV ECHO MEAS - PA V2 MAX: 130.3 CM/SEC
BH CV ECHO MEAS - PULM A REVS DUR: 0.11 SEC
BH CV ECHO MEAS - PULM A REVS VEL: 31.4 CM/SEC
BH CV ECHO MEAS - PULM DIAS VEL: 41.4 CM/SEC
BH CV ECHO MEAS - PULM S/D: 1.82
BH CV ECHO MEAS - PULM SYS VEL: 75.4 CM/SEC
BH CV ECHO MEAS - QP/QS: 0.54
BH CV ECHO MEAS - RAP SYSTOLE: 3 MMHG
BH CV ECHO MEAS - RV MAX PG: 4.6 MMHG
BH CV ECHO MEAS - RV V1 MAX: 107.7 CM/SEC
BH CV ECHO MEAS - RV V1 VTI: 24.9 CM
BH CV ECHO MEAS - RVOT DIAM: 1.82 CM
BH CV ECHO MEAS - RVSP: 11 MMHG
BH CV ECHO MEAS - SUP REN AO DIAM: 1.6 CM
BH CV ECHO MEAS - SV(LVOT): 120.2 ML
BH CV ECHO MEAS - SV(MOD-SP2): 60 ML
BH CV ECHO MEAS - SV(MOD-SP4): 94 ML
BH CV ECHO MEAS - SV(RVOT): 64.6 ML
BH CV ECHO MEAS - SVI(LVOT): 56.5 ML/M2
BH CV ECHO MEAS - SVI(MOD-SP2): 28.2 ML/M2
BH CV ECHO MEAS - SVI(MOD-SP4): 44.2 ML/M2
BH CV ECHO MEAS - TAPSE (>1.6): 3.2 CM
BH CV ECHO MEAS - TR MAX PG: 7.8 MMHG
BH CV ECHO MEAS - TR MAX VEL: 139.8 CM/SEC
BH CV ECHO MEASUREMENTS AVERAGE E/E' RATIO: 12.92
BH CV ECHO SHUNT ASSESSMENT PERFORMED (HIDDEN SCRIPTING): 1
BH CV XLRA - RV BASE: 3.2 CM
BH CV XLRA - RV LENGTH: 8.5 CM
BH CV XLRA - RV MID: 3.2 CM
BH CV XLRA - TDI S': 17.1 CM/SEC
BILIRUB SERPL-MCNC: 0.8 MG/DL (ref 0–1.2)
BILIRUB UR QL STRIP: NEGATIVE
BUN SERPL-MCNC: 23 MG/DL (ref 8–23)
BUN/CREAT SERPL: 15.4 (ref 7–25)
CALCIUM SPEC-SCNC: 9.8 MG/DL (ref 8.6–10.5)
CHLORIDE SERPL-SCNC: 108 MMOL/L (ref 98–107)
CLARITY UR: CLEAR
CO2 SERPL-SCNC: 21.1 MMOL/L (ref 22–29)
COLOR UR: YELLOW
CREAT SERPL-MCNC: 1.49 MG/DL (ref 0.76–1.27)
DEPRECATED RDW RBC AUTO: 41.9 FL (ref 37–54)
EGFRCR SERPLBLD CKD-EPI 2021: 48.3 ML/MIN/1.73
ERYTHROCYTE [DISTWIDTH] IN BLOOD BY AUTOMATED COUNT: 12.7 % (ref 12.3–15.4)
FOLATE SERPL-MCNC: 15.4 NG/ML (ref 4.78–24.2)
GLOBULIN UR ELPH-MCNC: 2.4 GM/DL
GLUCOSE SERPL-MCNC: 87 MG/DL (ref 65–99)
GLUCOSE UR STRIP-MCNC: NEGATIVE MG/DL
HCT VFR BLD AUTO: 42.7 % (ref 37.5–51)
HGB BLD-MCNC: 14.8 G/DL (ref 13–17.7)
HGB UR QL STRIP.AUTO: ABNORMAL
HYALINE CASTS UR QL AUTO: NORMAL /LPF
KETONES UR QL STRIP: NEGATIVE
LEFT ATRIUM VOLUME INDEX: 40.1 ML/M2
LEUKOCYTE ESTERASE UR QL STRIP.AUTO: NEGATIVE
MCH RBC QN AUTO: 31.4 PG (ref 26.6–33)
MCHC RBC AUTO-ENTMCNC: 34.7 G/DL (ref 31.5–35.7)
MCV RBC AUTO: 90.5 FL (ref 79–97)
NITRITE UR QL STRIP: NEGATIVE
PH UR STRIP.AUTO: 5.5 [PH] (ref 5–8)
PLATELET # BLD AUTO: 116 10*3/MM3 (ref 140–450)
PMV BLD AUTO: 10.2 FL (ref 6–12)
POTASSIUM SERPL-SCNC: 3.9 MMOL/L (ref 3.5–5.2)
PROT SERPL-MCNC: 6.1 G/DL (ref 6–8.5)
PROT UR QL STRIP: ABNORMAL
RBC # BLD AUTO: 4.72 10*6/MM3 (ref 4.14–5.8)
RBC # UR STRIP: NORMAL /HPF
REF LAB TEST METHOD: NORMAL
SINUS: 3.4 CM
SODIUM SERPL-SCNC: 140 MMOL/L (ref 136–145)
SP GR UR STRIP: 1.02 (ref 1–1.03)
SQUAMOUS #/AREA URNS HPF: NORMAL /HPF
STJ: 2.9 CM
TSH SERPL DL<=0.05 MIU/L-ACNC: 1 UIU/ML (ref 0.27–4.2)
UROBILINOGEN UR QL STRIP: ABNORMAL
VIT B12 BLD-MCNC: 487 PG/ML (ref 211–946)
WBC # UR STRIP: NORMAL /HPF
WBC NRBC COR # BLD AUTO: 8.26 10*3/MM3 (ref 3.4–10.8)

## 2024-06-23 PROCEDURE — 99221 1ST HOSP IP/OBS SF/LOW 40: CPT | Performed by: NURSE PRACTITIONER

## 2024-06-23 PROCEDURE — 82746 ASSAY OF FOLIC ACID SERUM: CPT | Performed by: NURSE PRACTITIONER

## 2024-06-23 PROCEDURE — 80053 COMPREHEN METABOLIC PANEL: CPT | Performed by: PHYSICIAN ASSISTANT

## 2024-06-23 PROCEDURE — G0378 HOSPITAL OBSERVATION PER HR: HCPCS

## 2024-06-23 PROCEDURE — 93306 TTE W/DOPPLER COMPLETE: CPT

## 2024-06-23 PROCEDURE — 84443 ASSAY THYROID STIM HORMONE: CPT | Performed by: NURSE PRACTITIONER

## 2024-06-23 PROCEDURE — 85027 COMPLETE CBC AUTOMATED: CPT | Performed by: PHYSICIAN ASSISTANT

## 2024-06-23 PROCEDURE — 93306 TTE W/DOPPLER COMPLETE: CPT | Performed by: INTERNAL MEDICINE

## 2024-06-23 PROCEDURE — 25510000001 PERFLUTREN (DEFINITY) 8.476 MG IN SODIUM CHLORIDE (PF) 0.9 % 10 ML INJECTION: Performed by: PHYSICIAN ASSISTANT

## 2024-06-23 PROCEDURE — 82607 VITAMIN B-12: CPT | Performed by: NURSE PRACTITIONER

## 2024-06-23 PROCEDURE — 81001 URINALYSIS AUTO W/SCOPE: CPT

## 2024-06-23 RX ADMIN — AMLODIPINE BESYLATE 5 MG: 5 TABLET ORAL at 09:04

## 2024-06-23 RX ADMIN — LISINOPRIL 20 MG: 20 TABLET ORAL at 09:05

## 2024-06-23 RX ADMIN — APIXABAN 2.5 MG: 2.5 TABLET, FILM COATED ORAL at 09:05

## 2024-06-23 RX ADMIN — METOPROLOL SUCCINATE 50 MG: 50 TABLET, FILM COATED, EXTENDED RELEASE ORAL at 09:05

## 2024-06-23 RX ADMIN — TAMSULOSIN HYDROCHLORIDE 0.4 MG: 0.4 CAPSULE ORAL at 09:04

## 2024-06-23 RX ADMIN — Medication 10 ML: at 09:05

## 2024-06-23 RX ADMIN — PERFLUTREN 2 ML: 6.52 INJECTION, SUSPENSION INTRAVENOUS at 10:02

## 2024-06-23 RX ADMIN — PANTOPRAZOLE SODIUM 40 MG: 40 TABLET, DELAYED RELEASE ORAL at 05:01

## 2024-06-23 NOTE — OUTREACH NOTE
Prep Survey      Flowsheet Row Responses   [facility] patient discharged from? [place]   Is LACE score < 7 ? Yes   Eligibility [facility]   Date of Admission 06/22/24   Date of Discharge 06/23/24   Discharge Disposition Home or Self Care   Discharge diagnosis Aphasia with associated headache   Does the patient have one of the following disease processes/diagnoses(primary or secondary)? Other   Does the patient have Home health ordered? No   Is there a DME ordered? No   Prep survey completed? Yes            Meri SOTELO - Registered Nurse

## 2024-06-23 NOTE — PLAN OF CARE
Goal Outcome Evaluation:      Vital signs within the normal range. Up ad francoise. Nil c/o head ache this shift. Nil dizziness. Nil c/o n/v. Seen by neurology. Discharge order in place. Discharge instruction/plan given to patient. Nil concerns voiced.

## 2024-06-23 NOTE — CONSULTS
"Neurology Consult Note    Consult Date: 6/23/2024    Referring MD: No ref. provider found    Reason for Consult I have been asked to see the patient in neurological consultation to render advice and opinion regarding \"stroke\"    Timi Wolf is a 78 y.o. male with hypertension, hyperlipidemia, CAD, BPH, history of portal vein thrombus and factor V Leiden on Eliquis 2.5 mg BID who presented via EMS 6/22 with complaints of headache and altered mental status/difficulty speaking.    He reports yesterday morning he was doing fine, woke in his normal state of health.  He went to the grocery store to  his wife's prescriptions at the pharmacy and he was able to tell the pharmacy staff her name but could not get her birthdate out though he states he knew it.  He then developed a fairly severe left frontal headache.  He did not get her prescription and proceeded to do his groceries stopping noting mild confusion during that.  He denies associated unilateral weakness/numbness, difficulty walking.  He did describe some difficulty seeing but denies true loss of vision and attributes vision change to his headache.  When he got home he had persistent headache so he attempted to lay down but could not.  He told his wife he called his son who called EMS.  He took one of his wife's Norco as well at home.  In the EMS he became nauseated which she attributed to carsickness.  Symptoms lasted approximately 1 hour then gradually resolved.  Currently he is at baseline without a headache.  Denies previous similar symptoms.    BP was 186/72 on arrival.  CMP notable for creatinine of 1.55, CBC with platelets of 120.  Initial CT head was negative for acute findings, MRI brain without contrast was also unremarkable for acute findings but per Dr. Prather showed approximately 2 chronic microhemorrhages, probable cerebral amyloid angiopathy.    The patient was seen in the cardiology/echo dept.  Past Medical/Surgical Hx:  Past Medical " History:   Diagnosis Date    Abdominal pain     Arteriosclerotic cardiovascular disease     Benign prostatic hyperplasia     Cataract     Diverticulosis     Dysphagia     GERD (gastroesophageal reflux disease)     H/O Interstitial cystitis     HL (hearing loss)     Hyperlipidemia     Hypertension     Internal hemorrhoids     Prostate enlargement     Pulmonary embolism     Renal cyst, right     Umbilical hernia      Past Surgical History:   Procedure Laterality Date    ACHILLES TENDON REPAIR      ruptured tendon    COLONOSCOPY N/A 2012    Dr Alvarado/[facility]    ENDOSCOPY      ENDOSCOPY N/A 09/29/2022    Procedure: ESOPHAGOGASTRODUODENOSCOPY with biopsy, wilson dilation;  Surgeon: Noel Alvarado MD;  Location: [facility] ENDOSCOPY;  Service: Gastroenterology;  Laterality: N/A;  hiatal hernia, gastritis    SHOULDER SURGERY      TONSILLECTOMY      UPPER GASTROINTESTINAL ENDOSCOPY  2022       Medications On Admission  Medications Prior to Admission   Medication Sig Dispense Refill Last Dose    amLODIPine-benazepril (LOTREL 5-20) 5-20 MG per capsule TAKE 1 CAPSULE BY MOUTH DAILY 90 capsule 3     apixaban (ELIQUIS) 2.5 MG tablet tablet Take 1 tablet by mouth 2 (Two) Times a Day. 180 tablet 3     Coenzyme Q10 150 MG capsule Take 1 tablet by mouth Every Night.       escitalopram (LEXAPRO) 10 MG tablet TAKE 1 TABLET BY MOUTH DAILY 90 tablet 3     Melatonin 3 MG capsule Take 1 capsule by mouth Every Night.       metoprolol succinate XL (TOPROL-XL) 50 MG 24 hr tablet TAKE 1 TABLET BY MOUTH DAILY 90 tablet 3     pantoprazole (PROTONIX) 40 MG EC tablet TAKE 1 TABLET BY MOUTH DAILY 90 tablet 0     pravastatin (PRAVACHOL) 80 MG tablet TAKE 1 TABLET BY MOUTH AT NIGHT 90 tablet 3     tamsulosin (FLOMAX) 0.4 MG capsule 24 hr capsule TAKE 1 CAPSULE BY MOUTH DAILY 90 capsule 3        Allergies:  Allergies   Allergen Reactions    Sulfamethoxazole-Trimethoprim Itching     Abdominal pain as well        Social Hx:  Social History     Socioeconomic  "History    Marital status:      Spouse name: Amarilys   Tobacco Use    Smoking status: Former     Current packs/day: 0.00     Types: Cigarettes     Quit date: 1994     Years since quittin.0    Smokeless tobacco: Never    Tobacco comments:     Quit over 30 years ago   Vaping Use    Vaping status: Never Used   Substance and Sexual Activity    Alcohol use: No    Drug use: No    Sexual activity: Defer       Family Hx:  Family History   Problem Relation Age of Onset    Heart attack Father     Heart disease Father          at 47    Hypertension Mother         Still living   1926 (96 yrs. Old)       REVIEW OF SYSTEMS:   Constitutional: [No fevers, chills, sweats or weight loss/gain]   Eye: [No double visionor loss of vision]   HEENT: [No tenderness, dizziness, or tinnitus. Normal smell and taste. Normal speech and swallowing] +h/a  Respiratory: [No shortness of breath, coughing, wheezing]   Cardiovascular: [No Chest pain, palpitations, syncope, GARG]   Gastrointestinal: [Normal bowel function. No nausea, vomiting, diarrhea]   Genitourinary: [Normal bladder function]   Musculoskeletal: [No trauma, joint or neck pain, myalgias, cramping or weakness]   Skin: [No itching, burning, pain, rashes, or birthmarks]   Endocrinology: [No heat or cold intolerance]   Psychiatric: [No sleep disturbance. No anxiety or depression]   Neurologic: [See HPI, above]       Exam    /78 (BP Location: Right arm, Patient Position: Standing)   Pulse 75   Temp 98.6 °F (37 °C) (Oral)   Resp 18   Ht 180.3 cm (71\")   Wt 92.5 kg (204 lb)   SpO2 91%   BMI 28.45 kg/m²   General appearance: Well developed, well nourished, well groomed, alert and cooperative.   HEENT: Normocephalic.   Neck: Supple.  Cardiac: Regular rate and rhythm.   Peripheral Vasculature: Radial pulses are equal and symmetric.  Chest Exam: Clear to auscultation bilaterally, no wheezes, no rhonchi.  Extremities: Normal, no edema.   Skin: No rashes or " birthmarks.     Higher integrative function: Oriented to time, place, person, intact recent and remote memory, attention span, concentration and language. Spontaneous speech, fund of vocabulary are normal.   CN II: Normal visual fields.   CN III IV VI: Extraocular movements are full without nystagmus. Pupils are equal, round, and reactive to light.   CN V: Normal facial sensation.  CN VII: Facial movements are symmetric, no weakness.   CN VIII: Auditory acuity is normal.   CN IX & X: Symmetric palatal movement.   CN XI: Sternocleidomastoid and trapezius are normal. No weakness.   CN XII: The tongue is midline.   Motor: Normal muscle strength, bulk, and tone in upper and lower extremities. No fasciculations, rigidity, spasticity or abnormal movements.   Sensation: Normal light touch in all extremities.   Station and gait: Deferred.  Muscle stretch reflexes: Reflexes are normal and symmetric in the upper and lower extremities.   Coordination: Finger to nose test showed no dysmetria. Rapid alternating movements were normal. Heel to shin normal.           DATA:    Lab Results   Component Value Date    GLUCOSE 87 06/23/2024    CALCIUM 9.8 06/23/2024     06/23/2024    K 3.9 06/23/2024    CO2 21.1 (L) 06/23/2024     (H) 06/23/2024    BUN 23 06/23/2024    CREATININE 1.49 (H) 06/23/2024    EGFRIFAFRI 59 (L) 11/22/2021    EGFRIFNONA 51 (L) 11/22/2021    BCR 15.4 06/23/2024    ANIONGAP 10.9 06/23/2024     Lab Results   Component Value Date    WBC 8.26 06/23/2024    HGB 14.8 06/23/2024    HCT 42.7 06/23/2024    MCV 90.5 06/23/2024     (L) 06/23/2024     Lab Results   Component Value Date    CHOL 141 06/22/2024     Lab Results   Component Value Date    HDL 41 06/22/2024    HDL 38 (L) 11/22/2021    HDL 42 11/18/2020     Lab Results   Component Value Date    LDL 89 06/22/2024    LDL 96 11/22/2021    LDL 75 11/18/2020     Lab Results   Component Value Date    TRIG 48 06/22/2024    TRIG 82 11/22/2021    TRIG 75  11/18/2020     Lab Results   Component Value Date    HGBA1C 5.40 06/22/2024     Lab Results   Component Value Date    INR 1.26 (H) 06/22/2024    INR 1.07 03/22/2021    PROTIME 16.0 (H) 06/22/2024    PROTIME 13.7 03/22/2021     Lab review: B12 47, folate 15.4, TSH1.0, hemoglobin A1c 5.40, LDL 89.  Imaging review: MRI brain images viewed by me, no acute findings seen.  Adult Transthoracic Echo Complete W/ Cont if Necessary Per Protocol (With Agitated Saline)    Result Date: 6/23/2024    Left ventricular systolic function is normal. Left ventricular ejection fraction appears to be greater than 70%.   Left ventricular wall thickness is consistent with mild concentric hypertrophy.   Left ventricular outflow tract peak flow gradient at rest is 11 mmHg. Left ventricular outflow tract peak gradient with valsalva is 40 mmHg.   Left ventricular diastolic function is consistent with (grade I) impaired relaxation.   Normal right ventricular cavity size and systolic function noted.   The left atrial cavity is mild to moderately dilated   Saline test results are negative.   The aortic valve leaflets are moderately calcified (aortic sclerosis)   Calculated right ventricular systolic pressure from tricuspid regurgitation is 11 mmHg   Borderline dilation of the aortic root is present. Aortic root = 3.9 cm   There is no evidence of pericardial effusion.     MRI Brain Without Contrast    Result Date: 6/23/2024  BRAIN MRI WITHOUT CONTRAST  HISTORY: Stroke, follow up  COMPARISON: None.  FINDINGS:  Multiplanar images of the head were obtained without gadolinium. No definite areas of restricted diffusion are seen to suggest acute infarct. There is atrophy. There is periventricular and deep white matter microangiopathic change. There is no midline shift or mass effect. The intracranial flow voids appear intact. No abnormality is seen on susceptibility weighted imaging. There is mucosal thickening within the ethmoid sinuses. There is trace  fluid within the left mastoid air cells.      1. No acute intracranial abnormality.  This report was finalized on 6/23/2024 1:49 AM by Dr. Minoo Crum M.D on Workstation: [id number]      CT Head Without Contrast    Result Date: 6/22/2024  CT HEAD WITHOUT CONTRAST  CLINICAL HISTORY: left frontal headache at 11am, on eliquis.  TECHNIQUE: CT scan of the head was obtained with 3 mm axial soft tissue algorithm images. No intravenous contrast was administered. Sagittal and coronal reconstructions were obtained.  COMPARISON: No previous similar studies are available for comparison.  FINDINGS:   The ventricles, sulci, and cisterns are age-appropriate. There are moderate changes of chronic small vessel ischemic phenomena. The basal ganglia and thalami are unremarkable in appearance. The posterior fossa structures are within normal limits.       No CT evidence for acute intracranial pathology.   Radiation dose reduction techniques were utilized, including automated exposure control and exposure modulation based on body size.  This report was finalized on 6/22/2024 3:58 PM by Dr. Mkie Alcala M.D on Workstation: [id number]      XR Chest 1 View    Result Date: 6/22/2024  XR CHEST 1 VW-  Clinical: Acute mental status change  COMPARISON examination 12/20/2023  FINDINGS: Shallow inspiratory effort with some crowding of the bronchovascular markings, atelectasis or infiltrate at the left lung base. The cardiomediastinal silhouette is stable. The right lung is clear. No vascular congestion is demonstrated. The remainder is unremarkable.  This report was finalized on 6/22/2024 2:42 PM by Dr. Pardeep Garcia M.D on Workstation: [id number]       Impression/plan:  1) episode of aphasia with associated headache, suspect related to hypertensive urgency with +/- TGA.  Low suspicion for TIA.  Continue Eliquis 2.5 mg twice daily for history of portal vein thrombosis/factor V Leiden, no antiplatelet needed.  Recommend patient to  monitor blood pressure twice daily for at least 10 to 14 days and record readings and take to next visit with PCP.    2) history of portal vein thrombosis/factor V Leiden, on Eliquis 2.5 mg twice daily    3) hypertensive urgency    Discussed with Dr. Prather.  Neurology will sign off, please call if further questions or concerns.

## 2024-06-23 NOTE — PLAN OF CARE
Goal Outcome Evaluation:   Patient admitted to obs unit for Aphasia X2 hrs/headache was at Pharmacy picking up med for wife. Neuro consult pending, TTE echo pending, MRI brain without contrast complete, morning labs complete,Patient is on Eliquis for portal vein thrombus. NIHSS  zero and neuro checks WDL. Fall precautions in place. Patient agrees with plan of care.

## 2024-06-23 NOTE — DISCHARGE SUMMARY
ED OBSERVATION PROGRESS/DISCHARGE SUMMARY    Date of Admission: 6/22/2024   LOS: 0 days   PCP: Stanley Ruiz MD    Final Diagnosis: Aphasia with associated headache    Hospital Outcome:     Mr. Wolf was admitted to the ED observation for further evaluation due to an episode of aphasia.  CT head showed no acute intracranial pathology.  MRI shows no acute abnormalities.  He had an echocardiogram which showed normal LV systolic function, ejection fraction appears to be greater than 70%.  Saline test was negative.  Aortic valve leaflets were moderately calcified.  No evidence of pericardial effusion.  He was hypertensive on arrival.  Blood pressure has improved since arrival.  Neurology was consulted.  They advised continuing his Eliquis at his current dose.  He should monitor his blood pressure twice daily for 10 to 14 days and record readings for his primary care doctor.  He will be discharged home.    ROS:  General: no fevers, chills  Respiratory: no cough, dyspnea  Cardiovascular: no chest pain, palpitations  Abdomen: No abdominal pain, nausea, vomiting, or diarrhea  Neurologic: No focal weakness    Objective   Physical Exam:  I have reviewed the vital signs.  Temp:  [97.9 °F (36.6 °C)-98.6 °F (37 °C)] 98.6 °F (37 °C)  Heart Rate:  [56-90] 75  Resp:  [16-22] 18  BP: (133-188)/(69-95) 144/78  General Appearance:    Alert, cooperative, no distress  Head:    Normocephalic, atraumatic  Eyes:    Sclerae anicteric  Neck:   Supple, no mass  Lungs: Clear to auscultation bilaterally, respirations unlabored  Heart: Regular rate and rhythm, S1 and S2 normal, no murmur, rub or gallop  Abdomen:  Soft, non-tender, bowel sounds active, nondistended  Extremities: No clubbing, cyanosis, or edema to lower extremities  Pulses:  2+ and symmetric in distal lower extremities  Skin: No rashes   Neurologic: Oriented x3, Normal strength to extremities    Results Review:    I have reviewed the labs, radiology results and diagnostic  studies.    Results from last 7 days   Lab Units 06/23/24  0459   WBC 10*3/mm3 8.26   HEMOGLOBIN g/dL 14.8   HEMATOCRIT % 42.7   PLATELETS 10*3/mm3 116*     Results from last 7 days   Lab Units 06/23/24  0459 06/22/24  1422   SODIUM mmol/L 140 140   POTASSIUM mmol/L 3.9 3.9   CHLORIDE mmol/L 108* 109*   CO2 mmol/L 21.1* 18.9*   BUN mg/dL 23 21   CREATININE mg/dL 1.49* 1.55*   CALCIUM mg/dL 9.8 9.6   BILIRUBIN mg/dL 0.8 0.8   ALK PHOS U/L 62 67   ALT (SGPT) U/L 11 12   AST (SGOT) U/L 14 15   GLUCOSE mg/dL 87 128*     Imaging Results (Last 24 Hours)       Procedure Component Value Units Date/Time    MRI Brain Without Contrast [[id number]] Collected: 06/23/24 0144     Updated: 06/23/24 0152    Narrative:      BRAIN MRI WITHOUT CONTRAST     HISTORY: Stroke, follow up     COMPARISON: None.     FINDINGS:  Multiplanar images of the head were obtained without  gadolinium. No definite areas of restricted diffusion are seen to  suggest acute infarct. There is atrophy. There is periventricular and  deep white matter microangiopathic change. There is no midline shift or  mass effect. The intracranial flow voids appear intact. No abnormality  is seen on susceptibility weighted imaging. There is mucosal thickening  within the ethmoid sinuses. There is trace fluid within the left mastoid  air cells.       Impression:      1. No acute intracranial abnormality.     This report was finalized on 6/23/2024 1:49 AM by Dr. Minoo Crum M.D on Workstation: [id number]       CT Head Without Contrast [[id number]] Collected: 06/22/24 1553     Updated: 06/22/24 1601    Narrative:      CT HEAD WITHOUT CONTRAST     CLINICAL HISTORY: left frontal headache at 11am, on eliquis.     TECHNIQUE: CT scan of the head was obtained with 3 mm axial soft tissue  algorithm images. No intravenous contrast was administered. Sagittal and  coronal reconstructions were obtained.     COMPARISON: No previous similar studies are available for comparison.      FINDINGS:       The ventricles, sulci, and cisterns are age-appropriate. There are  moderate changes of chronic small vessel ischemic phenomena. The basal  ganglia and thalami are unremarkable in appearance. The posterior fossa  structures are within normal limits.       Impression:         No CT evidence for acute intracranial pathology.        Radiation dose reduction techniques were utilized, including automated  exposure control and exposure modulation based on body size.     This report was finalized on 6/22/2024 3:58 PM by Dr. Mike Alcala M.D  on Workstation: [id number]       XR Chest 1 View [[id number]] Collected: 06/22/24 1439     Updated: 06/22/24 1445    Narrative:      XR CHEST 1 VW-     Clinical: Acute mental status change     COMPARISON examination 12/20/2023     FINDINGS: Shallow inspiratory effort with some crowding of the  bronchovascular markings, atelectasis or infiltrate at the left lung  base. The cardiomediastinal silhouette is stable. The right lung is  clear. No vascular congestion is demonstrated. The remainder is  unremarkable.     This report was finalized on 6/22/2024 2:42 PM by Dr. Pardeep Garcia M.D  on Workstation: [id number]               I have reviewed the medications.  ---------------------------------------------------------------------------------------------  Assessment & Plan   Assessment/Problem List    Aphasia    Plan:    Aphasia/confusion  Headache  -CT head without contrast negative for acute intracranial pathology  -MRI brain without contrast negative for acute abnormality  -Echocardiogram showed normal LV systolic function, ejection fraction appears to be greater than 70%.  Saline test was negative.  Aortic valve leaflets were moderately calcified.  No evidence of pericardial effusion.   -TSH, B12, folate normal  -Neurology consulted, patient has been cleared to discharge home    Factor V Leiden  History of portal vein thrombosis  -Continue Eliquis  -Follows with  hematology    Thrombocytopenia  -Chronic, platelet count 116, appears to be close to his baseline  -Continue to follow with outpatient hematology     Hypertension  -Continue home regimen  -Monitor your blood pressure twice daily for the next 10 to 14 days.  Record numbers and bring to your next appointment with your primary care provider.     Hyperlipidemia  -Continue pravastatin    CKD  -Renal function appears to be at his baseline    Disposition: Home    Follow-up after Discharge: Discharge summary    This note will serve as a discharge summary    Isha Feldman, SPARKLE 06/23/24 12:00 EDT    I have worn appropriate PPE during this patient encounter, sanitized my hands both with entering and exiting patient's room.    32 minutes has been spent by [facility] providers in the care of this patient while under observation status

## 2024-06-23 NOTE — PROGRESS NOTES
MD Attestation Note    I supervised care provided by the midlevel provider.    The JOSE JUAN and I have discussed this patient's history, physical exam, and treatment plan. I have reviewed the documentation and personally had a face to face interaction with the patient  I affirm the documentation and agree with the treatment and plan.       My personal findings are:        Subjective:  Patient asymptomatic this morning, denies any complaints.  No acute events overnight.        Objective:      PHYSICAL EXAM  Vitals:    06/23/24 0450 06/23/24 0500 06/23/24 0721 06/23/24 0724   BP: 154/78  (!) 183/87 168/83   BP Location: Right arm  Right arm Right arm   Patient Position: Lying  Lying Lying   Pulse: 60 68 60 65   Resp: 18  18    Temp: 98.4 °F (36.9 °C)  98.4 °F (36.9 °C)    TempSrc: Oral  Oral    SpO2:   97%    Weight:       Height:             GENERAL: no acute distress  HENT: nares patent  EYES: no scleral icterus  CV: regular rhythm, normal rate  RESPIRATORY: normal effort  ABDOMEN: soft  MUSCULOSKELETAL: no deformity  NEURO: alert, moves all extremities, follows commands  PSYCH:  calm, cooperative  SKIN: warm, dry    Vital signs and nursing notes reviewed.      Assessment/ Plan:  Patient presenting with transient speech change, currently resolved.  No other complaints.  MRI demonstrating no acute abnormality.  Plan for neurology consultation today.

## 2024-06-24 ENCOUNTER — TRANSITIONAL CARE MANAGEMENT TELEPHONE ENCOUNTER (OUTPATIENT)
Dept: CALL CENTER | Facility: HOSPITAL | Age: 79
End: 2024-06-24
Payer: MEDICARE

## 2024-06-24 LAB
QT INTERVAL: 423 MS
QTC INTERVAL: 463 MS

## 2024-06-24 NOTE — OUTREACH NOTE
Call Center TCM Note      Flowsheet Row Responses   [facility] patient discharged from? [place]   Does the patient have one of the following disease processes/diagnoses(primary or secondary)? Other   TCM attempt successful? Yes   Call start time 0856   Call end time 0900   Discharge diagnosis Aphasia with associated headache   Meds reviewed with patient/caregiver? Yes   Is the patient having any side effects they believe may be caused by any medication additions or changes? No   Does the patient have all medications ordered at discharge? Yes   Is the patient taking all medications as directed (includes completed medication regime)? Yes   Does the patient have an appointment with their PCP within 7-14 days of discharge? No appointments available   Nursing Interventions Routed TCM call to PCP office   Psychosocial issues? No   Did the patient receive a copy of their discharge instructions? Yes   Nursing interventions Reviewed instructions with patient   What is the patient's perception of their health status since discharge? Improving   Is the patient/caregiver able to teach back signs and symptoms related to disease process for when to call PCP? Yes   Is the patient/caregiver able to teach back signs and symptoms related to disease process for when to call 911? Yes   Is the patient/caregiver able to teach back the hierarchy of who to call/visit for symptoms/problems? PCP, Specialist, Home health nurse, Urgent Care, ED, 911 Yes   If the patient is a current smoker, are they able to teach back resources for cessation? Not a smoker   Additional teach back comments Had a slight headache this morning but took a tylenol and it is better. Pt is keeping a bp log   TCM call completed? Yes   Wrap up additional comments Will  message office for an appt.   Call end time 0900            Sue Muhammad LPN    6/24/2024, 09:01 EDT

## 2024-06-27 ENCOUNTER — TELEPHONE (OUTPATIENT)
Dept: INTERNAL MEDICINE | Facility: CLINIC | Age: 79
End: 2024-06-27
Payer: MEDICARE

## 2024-06-27 RX ORDER — PANTOPRAZOLE SODIUM 40 MG/1
40 TABLET, DELAYED RELEASE ORAL DAILY
Qty: 90 TABLET | Refills: 3 | Status: SHIPPED | OUTPATIENT
Start: 2024-06-27

## 2024-06-27 NOTE — TELEPHONE ENCOUNTER
Patient is calling with concerns about Blood Pressures.  BP is too high.  Verified #.        Please advise

## 2024-06-28 ENCOUNTER — OFFICE VISIT (OUTPATIENT)
Dept: INTERNAL MEDICINE | Facility: CLINIC | Age: 79
End: 2024-06-28
Payer: MEDICARE

## 2024-06-28 VITALS
SYSTOLIC BLOOD PRESSURE: 130 MMHG | HEART RATE: 55 BPM | OXYGEN SATURATION: 96 % | TEMPERATURE: 98.6 F | DIASTOLIC BLOOD PRESSURE: 70 MMHG | BODY MASS INDEX: 28.14 KG/M2 | HEIGHT: 71 IN | WEIGHT: 201 LBS

## 2024-06-28 DIAGNOSIS — I10 PRIMARY HYPERTENSION: Primary | ICD-10-CM

## 2024-06-28 RX ORDER — AMLODIPINE BESYLATE AND BENAZEPRIL HYDROCHLORIDE 10; 20 MG/1; MG/1
1 CAPSULE ORAL DAILY
Qty: 30 CAPSULE | Refills: 2 | Status: SHIPPED | OUTPATIENT
Start: 2024-06-28 | End: 2024-07-12

## 2024-06-28 NOTE — PROGRESS NOTES
"Transitional Care Follow Up Visit  Subjective     Timi Wolf is a 78 y.o. male who presents for a transitional care management visit.    Within 48 business hours after discharge our office contacted him via telephone to coordinate his care and needs.      I reviewed and discussed the details of that call along with the discharge summary, hospital problems, inpatient lab results, inpatient diagnostic studies, and consultation reports with Timi.     Current outpatient and discharge medications have been reconciled for the patient.  Reviewed by: SPARKLE Darby          6/23/2024     3:05 PM   Date of TCM Phone Call   [facility]   Date of Admission 6/22/2024   Date of Discharge 6/23/2024   Discharge Disposition Home or Self Care     Risk for Readmission (LACE) No data recorded      History of Present Illness   Course During Hospital Stay:      \"Mr. Wolf was admitted to the ED observation for further evaluation due to an episode of aphasia.  CT head showed no acute intracranial pathology.  MRI shows no acute abnormalities.  He had an echocardiogram which showed normal LV systolic function, ejection fraction appears to be greater than 70%.  Saline test was negative.  Aortic valve leaflets were moderately calcified.  No evidence of pericardial effusion.  He was hypertensive on arrival.  Blood pressure has improved since arrival.  Neurology was consulted.  They advised continuing his Eliquis at his current dose.  He should monitor his blood pressure twice daily for 10 to 14 days and record readings for his primary care doctor.  He will be discharged home.\"   - pt states his BP at home have been ranging from 160-130/ 70-80.    - Pt denies anymore confusion, but states he has had a headache over his right and left eye. Pt states when he drinks coffee and takes his medicine this helps and it goes away.    - Pt has taken his BP medication- 1 hour ago.        The following portions " "of the patient's history were reviewed and updated as appropriate: allergies, current medications, past family history, past medical history, past social history, past surgical history, and problem list.        Objective   /70 (BP Location: Right arm)   Pulse 55   Temp 98.6 °F (37 °C) (Oral)   Ht 180.3 cm (71\")   Wt 91.2 kg (201 lb)   SpO2 96%   BMI 28.03 kg/m²   Physical Exam  Vitals reviewed.   Constitutional:       Appearance: Normal appearance.   HENT:      Head: Normocephalic.   Cardiovascular:      Rate and Rhythm: Normal rate and regular rhythm.      Pulses: Normal pulses.      Heart sounds: Normal heart sounds.   Pulmonary:      Effort: Pulmonary effort is normal.      Breath sounds: Normal breath sounds.   Musculoskeletal:      Right lower leg: No edema.      Left lower leg: No edema.   Skin:     General: Skin is warm and dry.      Capillary Refill: Capillary refill takes less than 2 seconds.   Neurological:      General: No focal deficit present.      Mental Status: He is alert.   Psychiatric:         Mood and Affect: Mood normal.         Behavior: Behavior normal.         Assessment & Plan   Problems Addressed this Visit       Hypertension - Primary (Chronic)     Hypertension is borderline  Medication changes per orders.  Dietary sodium restriction.  Regular aerobic exercise.  Ambulatory blood pressure monitoring.  Blood pressure will be reassessedin 2 weeks.          Diagnoses         Codes Comments    Primary hypertension    -  Primary ICD-10-CM: I10  ICD-9-CM: 401.9               Will increase blood pressure medication. I likely think pt's headaches are due to HTN. IF this is not sufficient will contact MD Rasta and see if pt should increase Eliquis to 5mg again. Pt will monitor BP at home and reach out if systolic remails in the 160s or if diastolics become too low in the 50s and 60s consistently. PT to check his BP around 2 times daily for 2 weeks.  Patient verbalized understanding and " is comfortable with plan of care.

## 2024-07-03 DIAGNOSIS — I10 ESSENTIAL HYPERTENSION: ICD-10-CM

## 2024-07-05 RX ORDER — AMLODIPINE BESYLATE AND BENAZEPRIL HYDROCHLORIDE 5; 20 MG/1; MG/1
1 CAPSULE ORAL DAILY
Qty: 90 CAPSULE | Refills: 3 | OUTPATIENT
Start: 2024-07-05

## 2024-07-12 ENCOUNTER — OFFICE VISIT (OUTPATIENT)
Dept: INTERNAL MEDICINE | Facility: CLINIC | Age: 79
End: 2024-07-12
Payer: MEDICARE

## 2024-07-12 VITALS
SYSTOLIC BLOOD PRESSURE: 145 MMHG | BODY MASS INDEX: 28.39 KG/M2 | TEMPERATURE: 98.2 F | DIASTOLIC BLOOD PRESSURE: 74 MMHG | WEIGHT: 202.8 LBS | HEART RATE: 52 BPM | OXYGEN SATURATION: 95 % | HEIGHT: 71 IN

## 2024-07-12 DIAGNOSIS — I10 ESSENTIAL HYPERTENSION: Primary | ICD-10-CM

## 2024-07-12 PROCEDURE — 3078F DIAST BP <80 MM HG: CPT

## 2024-07-12 PROCEDURE — 1126F AMNT PAIN NOTED NONE PRSNT: CPT

## 2024-07-12 PROCEDURE — 99214 OFFICE O/P EST MOD 30 MIN: CPT

## 2024-07-12 PROCEDURE — 3077F SYST BP >= 140 MM HG: CPT

## 2024-07-12 RX ORDER — AMLODIPINE AND BENAZEPRIL HYDROCHLORIDE 10; 40 MG/1; MG/1
1 CAPSULE ORAL DAILY
Qty: 90 CAPSULE | Refills: 0 | Status: SHIPPED | OUTPATIENT
Start: 2024-07-12

## 2024-07-12 NOTE — PROGRESS NOTES
"        Chief Complaint  Follow-up and Hypertension     Subjective:      History of Present Illness {CC  Problem List  Visit  Diagnosis   Encounters  Notes  Medications  Labs  Result Review Imaging  Media :23}     Timi Wolf presents to [facility] for:      History of Present Illness       HTN- Pt states his BP at home has been 130-140s/80s. PT has occasional 150 readings, but these are before his medication. Pt states that he has had intermittent headaches.     I have reviewed patient's medical history, any new submitted information provided by patient or medical assistant and updated medical record.      Objective:      Physical Exam  Vitals reviewed.   Constitutional:       Appearance: Normal appearance.   HENT:      Head: Normocephalic.   Cardiovascular:      Rate and Rhythm: Normal rate and regular rhythm.      Pulses: Normal pulses.      Heart sounds: Normal heart sounds.   Pulmonary:      Effort: Pulmonary effort is normal.      Breath sounds: Normal breath sounds.   Musculoskeletal:      Right lower leg: No edema.      Left lower leg: No edema.   Skin:     General: Skin is warm and dry.      Capillary Refill: Capillary refill takes less than 2 seconds.   Neurological:      General: No focal deficit present.      Mental Status: He is alert.   Psychiatric:         Mood and Affect: Mood normal.         Behavior: Behavior normal.        Result Review  Data Reviewed:{ Labs  Result Review  Imaging  Med Tab  Media :23}                Vital Signs:   /74 (BP Location: Left arm, Patient Position: Sitting, Cuff Size: Adult)   Pulse 52   Temp 98.2 °F (36.8 °C) (Oral)   Ht 180.3 cm (71\")   Wt 92 kg (202 lb 12.8 oz)   SpO2 95%   BMI 28.28 kg/m²   Estimated body mass index is 28.28 kg/m² as calculated from the following:    Height as of this encounter: 180.3 cm (71\").    Weight as of this encounter: 92 kg (202 lb 12.8 oz).        Requested Prescriptions "     Signed Prescriptions Disp Refills    amLODIPine-benazepril (LOTREL) 10-40 MG per capsule 90 capsule 0     Sig: Take 1 capsule by mouth Daily.       Routine medications provided by this office will also be refilled via pharmacy request.       Current Outpatient Medications:     apixaban (ELIQUIS) 2.5 MG tablet tablet, Take 1 tablet by mouth 2 (Two) Times a Day., Disp: 180 tablet, Rfl: 3    Coenzyme Q10 150 MG capsule, Take 1 tablet by mouth Every Night., Disp: , Rfl:     escitalopram (LEXAPRO) 10 MG tablet, TAKE 1 TABLET BY MOUTH DAILY, Disp: 90 tablet, Rfl: 3    Melatonin 3 MG capsule, Take 1 capsule by mouth Every Night., Disp: , Rfl:     metoprolol succinate XL (TOPROL-XL) 50 MG 24 hr tablet, TAKE 1 TABLET BY MOUTH DAILY, Disp: 90 tablet, Rfl: 3    pantoprazole (PROTONIX) 40 MG EC tablet, TAKE 1 TABLET BY MOUTH DAILY, Disp: 90 tablet, Rfl: 3    pravastatin (PRAVACHOL) 80 MG tablet, TAKE 1 TABLET BY MOUTH AT NIGHT, Disp: 90 tablet, Rfl: 3    tamsulosin (FLOMAX) 0.4 MG capsule 24 hr capsule, TAKE 1 CAPSULE BY MOUTH DAILY, Disp: 90 capsule, Rfl: 3    amLODIPine-benazepril (LOTREL) 10-40 MG per capsule, Take 1 capsule by mouth Daily., Disp: 90 capsule, Rfl: 0     Assessment and Plan:      Assessment and Plan {CC Problem List  Visit Diagnosis  ROS  Review (Popup)  Health Maintenance  Quality  BestPractice  Medications  SmartSets  SnapShot Encounters  Media :23}     Diagnoses and all orders for this visit:    1. Essential hypertension (Primary)  -     amLODIPine-benazepril (LOTREL) 10-40 MG per capsule; Take 1 capsule by mouth Daily.  Dispense: 90 capsule; Refill: 0             New Medications Ordered This Visit   Medications    amLODIPine-benazepril (LOTREL) 10-40 MG per capsule     Sig: Take 1 capsule by mouth Daily.     Dispense:  90 capsule     Refill:  0       Educated patient on increasing dosing of Lotrel.  Educated patient to monitor blood pressure at home.  Educated patient to be seen again by  Dr. Ruiz.  Patient to reach out if blood pressure remains elevated in the 150s and 160 systolic.  Patient verbalized understanding and is comfortable with plan of care.    Follow Up {Instructions Charge Capture  Follow-up Communications :23}     Return in about 1 month (around 8/12/2024) for Medicare Wellness.      Patient was given instructions and counseling regarding his condition or for health maintenance advice. Please see specific information pulled into the AVS if appropriate.    Dragon disclaimer:   Much of this encounter note is an electronic transcription/translation of spoken language to printed text. The electronic translation of spoken language may permit erroneous, or at times, nonsensical words or phrases to be inadvertently transcribed; Although I have reviewed the note for such errors, some may still exist.     Additional Patient Counseling:       There are no Patient Instructions on file for this visit.

## 2024-07-12 NOTE — ASSESSMENT & PLAN NOTE
Hypertension is borderline  Medication changes per orders.  Dietary sodium restriction.  Regular aerobic exercise.  Ambulatory blood pressure monitoring.  Blood pressure will be reassessedin 2 weeks.

## 2024-07-15 DIAGNOSIS — E78.2 MIXED HYPERLIPIDEMIA: ICD-10-CM

## 2024-07-16 RX ORDER — PRAVASTATIN SODIUM 80 MG/1
TABLET ORAL
Qty: 90 TABLET | Refills: 0 | Status: SHIPPED | OUTPATIENT
Start: 2024-07-16

## 2024-08-21 ENCOUNTER — OFFICE VISIT (OUTPATIENT)
Dept: INTERNAL MEDICINE | Facility: CLINIC | Age: 79
End: 2024-08-21
Payer: MEDICARE

## 2024-08-21 VITALS
BODY MASS INDEX: 28.31 KG/M2 | OXYGEN SATURATION: 95 % | SYSTOLIC BLOOD PRESSURE: 128 MMHG | WEIGHT: 203 LBS | DIASTOLIC BLOOD PRESSURE: 76 MMHG | HEART RATE: 56 BPM

## 2024-08-21 DIAGNOSIS — I10 PRIMARY HYPERTENSION: Chronic | ICD-10-CM

## 2024-08-21 DIAGNOSIS — Z00.00 MEDICARE ANNUAL WELLNESS VISIT, SUBSEQUENT: ICD-10-CM

## 2024-08-21 DIAGNOSIS — R73.01 IMPAIRED FASTING GLUCOSE: ICD-10-CM

## 2024-08-21 DIAGNOSIS — I81 PORTAL VEIN THROMBOSIS: ICD-10-CM

## 2024-08-21 DIAGNOSIS — K21.9 GASTROESOPHAGEAL REFLUX DISEASE, UNSPECIFIED WHETHER ESOPHAGITIS PRESENT: ICD-10-CM

## 2024-08-21 DIAGNOSIS — R47.01 EXPRESSIVE APHASIA: ICD-10-CM

## 2024-08-21 DIAGNOSIS — N40.0 BENIGN PROSTATIC HYPERPLASIA, UNSPECIFIED WHETHER LOWER URINARY TRACT SYMPTOMS PRESENT: ICD-10-CM

## 2024-08-21 DIAGNOSIS — D68.51 FACTOR 5 LEIDEN MUTATION, HETEROZYGOUS: ICD-10-CM

## 2024-08-21 DIAGNOSIS — E78.2 MIXED HYPERLIPIDEMIA: ICD-10-CM

## 2024-08-21 DIAGNOSIS — Z79.01 CURRENT USE OF LONG TERM ANTICOAGULATION: Primary | ICD-10-CM

## 2024-08-21 RX ORDER — FAMOTIDINE 40 MG/1
40 TABLET, FILM COATED ORAL DAILY
Qty: 90 TABLET | Refills: 3 | Status: SHIPPED | OUTPATIENT
Start: 2024-08-21

## 2024-08-21 RX ORDER — ROSUVASTATIN CALCIUM 40 MG/1
40 TABLET, COATED ORAL DAILY
Qty: 90 TABLET | Refills: 3 | Status: SHIPPED | OUTPATIENT
Start: 2024-08-21

## 2024-08-21 NOTE — ASSESSMENT & PLAN NOTE
Transient word searching problem as far as expressive aphasia seen in the ER without specific findings with some moderate small vessel ischemic phenomenon on MRI without any other findings.  Will maximize risk factor treatment with switch from pravastatin to rosuvastatin 40 mg daily

## 2024-08-21 NOTE — PROGRESS NOTES
Subjective   The ABCs of the Annual Wellness Visit  Medicare Wellness Visit      Timi Wolf is a 79 y.o. patient who presents for a Medicare Wellness Visit.    The following portions of the patient's history were reviewed and   updated as appropriate: allergies, current medications, past family history, past medical history, past social history, past surgical history, and problem list.    Compared to one year ago, the patient's physical   health is the same.  Compared to one year ago, the patient's mental   health is the same.    Recent Hospitalizations:  This patient has had a [facility] admission record on file within the last 365 days.  Current Medical Providers:  Patient Care Team:  Stanley Ruiz MD as PCP - General (Family Medicine)  Luis M Alonso MD as Referring Physician (Infectious Diseases)    Outpatient Medications Prior to Visit   Medication Sig Dispense Refill    amLODIPine-benazepril (LOTREL) 10-40 MG per capsule Take 1 capsule by mouth Daily. 90 capsule 0    apixaban (ELIQUIS) 2.5 MG tablet tablet Take 1 tablet by mouth 2 (Two) Times a Day. 180 tablet 3    Coenzyme Q10 150 MG capsule Take 1 tablet by mouth Every Night.      escitalopram (LEXAPRO) 10 MG tablet TAKE 1 TABLET BY MOUTH DAILY 90 tablet 3    Melatonin 3 MG capsule Take 1 capsule by mouth Every Night.      metoprolol succinate XL (TOPROL-XL) 50 MG 24 hr tablet TAKE 1 TABLET BY MOUTH DAILY 90 tablet 3    tamsulosin (FLOMAX) 0.4 MG capsule 24 hr capsule TAKE 1 CAPSULE BY MOUTH DAILY 90 capsule 3    pantoprazole (PROTONIX) 40 MG EC tablet TAKE 1 TABLET BY MOUTH DAILY 90 tablet 3    pravastatin (PRAVACHOL) 80 MG tablet TAKE 1 TABLET BY MOUTH AT NIGHT 90 tablet 0     No facility-administered medications prior to visit.     No opioid medication identified on active medication list. I have reviewed chart for other potential  high risk medication/s and harmful drug interactions in the elderly.      Aspirin is not on active medication  "list.  Aspirin use is not indicated based on review of current medical condition/s. Risk of harm outweighs potential benefits.  .    Patient Active Problem List   Diagnosis    Chronic coronary artery disease    Hyperlipidemia    Hypertension    Interstitial cystitis (chronic) without hematuria    Prostate enlargement    Epigastric pain    Portal vein thrombosis    Hypercoagulable state    CKD (chronic kidney disease) 3a    Thrombocytopenia    Dysphagia    Heartburn    Factor 5 Leiden mutation, heterozygous    Upper respiratory tract infection due to COVID-19 virus    Impaired fasting glucose    Current use of long term anticoagulation    Reactive depression    Amnesia    Expressive aphasia    Medicare annual wellness visit, subsequent     Advance Care Planning Advance Directive is not on file.  ACP discussion was held with the patient during this visit. Patient has an advance directive (not in EMR), copy requested.            Objective   Vitals:    08/21/24 0952   BP: 128/76   BP Location: Left arm   Patient Position: Sitting   Cuff Size: Adult   Pulse: 56   SpO2: 95%   Weight: 92.1 kg (203 lb)       Estimated body mass index is 28.31 kg/m² as calculated from the following:    Height as of 7/12/24: 180.3 cm (71\").    Weight as of this encounter: 92.1 kg (203 lb).    BMI is >= 25 and <30. (Overweight) The following options were offered after discussion;: exercise counseling/recommendations and nutrition counseling/recommendations       Does the patient have evidence of cognitive impairment? No  Lab Results   Component Value Date    TRIG 48 06/22/2024    HDL 41 06/22/2024    LDL 89 06/22/2024    VLDL 11 06/22/2024    HGBA1C 5.40 06/22/2024                                                                                                Health  Risk Assessment    Smoking Status:  Social History     Tobacco Use   Smoking Status Former    Current packs/day: 0.00    Average packs/day: 1 pack/day for 32.0 years (32.0 ttl " pk-yrs)    Types: Cigarettes    Start date:     Quit date:     Years since quittin.6   Smokeless Tobacco Never   Tobacco Comments    Quit over 30 years ago     Alcohol Consumption:  Social History     Substance and Sexual Activity   Alcohol Use No       Fall Risk Screen  STEADI Fall Risk Assessment was completed, and patient is at LOW risk for falls.Assessment completed on:2024    Depression Screenin/21/2024     9:56 AM   PHQ-2/PHQ-9 Depression Screening   Little Interest or Pleasure in Doing Things 0-->not at all   Feeling Down, Depressed or Hopeless 0-->not at all   PHQ-9: Brief Depression Severity Measure Score 0     Health Habits and Functional and Cognitive Screenin/21/2024     9:54 AM   Functional & Cognitive Status   Do you have difficulty preparing food and eating? No   Do you have difficulty bathing yourself, getting dressed or grooming yourself? No   Do you have difficulty using the toilet? No   Do you have difficulty moving around from place to place? No   Do you have trouble with steps or getting out of a bed or a chair? No   Current Diet Frequent Junk Food   Dental Exam Up to date   Eye Exam Up to date   Exercise (times per week) 4 times per week   Current Exercises Include Walking   Do you need help using the phone?  No   Are you deaf or do you have serious difficulty hearing?  Yes   Do you need help to go to places out of walking distance? No   Do you need help shopping? No   Do you need help preparing meals?  No   Do you need help with housework?  No   Do you need help with laundry? No   Do you need help taking your medications? No   Do you need help managing money? No   Do you ever drive or ride in a car without wearing a seat belt? No   Have you felt unusual stress, anger or loneliness in the last month? Yes   Who do you live with? Spouse   If you need help, do you have trouble finding someone available to you? No   Have you been bothered in the last four weeks  by sexual problems? No   Do you have difficulty concentrating, remembering or making decisions? No           Age-appropriate Screening Schedule:  Refer to the list below for future screening recommendations based on patient's age, sex and/or medical conditions. Orders for these recommended tests are listed in the plan section. The patient has been provided with a written plan.    Health Maintenance List  Health Maintenance   Topic Date Due    RSV Vaccine - Adults (1 - 1-dose 60+ series) Never done    COVID-19 Vaccine (6 - 2023-24 season) 02/11/2024    BMI FOLLOWUP  02/20/2024    TDAP/TD VACCINES (3 - Td or Tdap) 05/18/2024    INFLUENZA VACCINE  08/01/2024    LIPID PANEL  06/22/2025    ANNUAL WELLNESS VISIT  08/21/2025    COLORECTAL CANCER SCREENING  08/28/2025    HEPATITIS C SCREENING  Completed    Pneumococcal Vaccine 65+  Completed    ZOSTER VACCINE  Completed                                                                                                                                                CMS Preventative Services Quick Reference  Risk Factors Identified During Encounter  None Identified    The above risks/problems have been discussed with the patient.  Pertinent information has been shared with the patient in the After Visit Summary.  An After Visit Summary and PPPS were made available to the patient.    Follow Up:   Next Medicare Wellness visit to be scheduled in 1 year.         Additional E&M Note during same encounter follows:  Patient has additional, significant, and separately identifiable condition(s)/problem(s) that require work above and beyond the Medicare Wellness Visit     Chief Complaint  Medicare Wellness-subsequent    Subjective   Timi is a delightful gentleman is very active.  He had a episode in June seen in the emergency room overnight with an expressive aphasia but found on workup to have no acute findings on CT or MRI other than some small vessel ischemic phenomenon chronically.   Otherwise labs appeared stable at that time to.    We discussed LDL optimization given LDL approaching 100 on his current medicine we will switch to rosuvastatin 40 mg daily.  His antihypertensives are working great.  He is on tamsulosin 0.4 for prostate.    Also pantoprazole for heartburn reflux GERD.      Timi is also being seen today for an annual adult preventative physical exam.  and Timi is also being seen today for additional medical problem/s.                Objective   Vital Signs:  /76 (BP Location: Left arm, Patient Position: Sitting, Cuff Size: Adult)   Pulse 56   Wt 92.1 kg (203 lb)   SpO2 95%   BMI 28.31 kg/m²   Physical Exam  Vitals reviewed.   Constitutional:       Appearance: He is well-developed.   HENT:      Head: Normocephalic and atraumatic.      Right Ear: Tympanic membrane and external ear normal.      Left Ear: Tympanic membrane and external ear normal.      Nose: Nose normal.   Eyes:      Conjunctiva/sclera: Conjunctivae normal.      Pupils: Pupils are equal, round, and reactive to light.   Neck:      Thyroid: No thyromegaly.      Vascular: No JVD.   Cardiovascular:      Rate and Rhythm: Normal rate and regular rhythm.      Heart sounds: Normal heart sounds.   Pulmonary:      Effort: Pulmonary effort is normal.      Breath sounds: Normal breath sounds.   Abdominal:      General: Bowel sounds are normal.      Palpations: Abdomen is soft.   Musculoskeletal:         General: Normal range of motion.      Cervical back: Normal range of motion and neck supple.   Lymphadenopathy:      Cervical: No cervical adenopathy.   Skin:     General: Skin is warm and dry.      Findings: No rash.   Neurological:      Mental Status: He is alert and oriented to person, place, and time.      Cranial Nerves: No cranial nerve deficit.      Coordination: Coordination normal.   Psychiatric:         Behavior: Behavior normal.         Thought Content: Thought content normal.         Judgment: Judgment normal.                  Assessment and Plan               Current use of long term anticoagulation  Continue Eliquis 2.5 twice daily  Factor 5 Leiden mutation, heterozygous  Eliquis 2.5 twice daily  Impaired fasting glucose  Check A1c  Portal vein thrombosis  Continue Eliquis 2.5 twice daily  Mixed hyperlipidemia    optimize lowering LDL will switch from pravastatin 80 to Crestor 40 mg daily  Primary hypertension   continue amlodipine benazepril 10-40 daily plus metoprolol succinate XL 50 mg daily.  Medicare annual wellness visit, subsequent    Expressive aphasia  Transient word searching problem as far as expressive aphasia seen in the ER without specific findings with some moderate small vessel ischemic phenomenon on MRI without any other findings.  Will maximize risk factor treatment with switch from pravastatin to rosuvastatin 40 mg daily  Benign prostatic hyperplasia, unspecified whether lower urinary tract symptoms present  Continue tamsulosin 0.4 at bedtime/occasional flareup of interstitial cystitis  Gastroesophageal reflux disease, unspecified whether esophagitis present  Given no symptoms we will switch from pantoprazole 40 to famotidine/Pepcid 40 mg daily given population studies showing some diminishment in memory over time      Advised immunizations including COVID-19/Tdap/RSV  Orders Placed This Encounter   Procedures    PSA, Total & Free     Standing Status:   Future     Standing Expiration Date:   8/21/2025     Order Specific Question:   Release to patient     Answer:   Routine Release [[id number]]    Lipid Panel With / Chol / HDL Ratio     Standing Status:   Future     Standing Expiration Date:   8/21/2025     Order Specific Question:   Release to patient     Answer:   Routine Release [[id number]]     New Medications Ordered This Visit   Medications    rosuvastatin (Crestor) 40 MG tablet     Sig: Take 1 tablet by mouth Daily.     Dispense:  90 tablet     Refill:  3    famotidine (Pepcid) 40 MG tablet      Sig: Take 1 tablet by mouth Daily. For heart burn     Dispense:  90 tablet     Refill:  3          Follow Up   No follow-ups on file.  Patient was given instructions and counseling regarding his condition or for health maintenance advice. Please see specific information pulled into the AVS if appropriate.

## 2024-09-01 DIAGNOSIS — I10 ESSENTIAL HYPERTENSION: ICD-10-CM

## 2024-09-01 DIAGNOSIS — N40.0 PROSTATE ENLARGEMENT: ICD-10-CM

## 2024-09-03 RX ORDER — TAMSULOSIN HYDROCHLORIDE 0.4 MG/1
1 CAPSULE ORAL DAILY
Qty: 90 CAPSULE | Refills: 3 | Status: SHIPPED | OUTPATIENT
Start: 2024-09-03

## 2024-09-03 RX ORDER — METOPROLOL SUCCINATE 50 MG/1
50 TABLET, EXTENDED RELEASE ORAL DAILY
Qty: 90 TABLET | Refills: 3 | Status: SHIPPED | OUTPATIENT
Start: 2024-09-03

## 2024-09-03 RX ORDER — ESCITALOPRAM OXALATE 10 MG/1
10 TABLET ORAL DAILY
Qty: 90 TABLET | Refills: 3 | Status: SHIPPED | OUTPATIENT
Start: 2024-09-03

## 2024-09-05 DIAGNOSIS — I10 ESSENTIAL HYPERTENSION: ICD-10-CM

## 2024-09-06 RX ORDER — AMLODIPINE AND BENAZEPRIL HYDROCHLORIDE 10; 40 MG/1; MG/1
1 CAPSULE ORAL DAILY
Qty: 90 CAPSULE | Refills: 3 | Status: SHIPPED | OUTPATIENT
Start: 2024-09-06

## 2024-12-27 ENCOUNTER — OFFICE VISIT (OUTPATIENT)
Dept: INTERNAL MEDICINE | Facility: CLINIC | Age: 79
End: 2024-12-27
Payer: MEDICARE

## 2024-12-27 VITALS
DIASTOLIC BLOOD PRESSURE: 71 MMHG | HEIGHT: 71 IN | HEART RATE: 44 BPM | TEMPERATURE: 98.1 F | BODY MASS INDEX: 29.29 KG/M2 | SYSTOLIC BLOOD PRESSURE: 143 MMHG | OXYGEN SATURATION: 94 % | WEIGHT: 209.2 LBS

## 2024-12-27 DIAGNOSIS — N30.10 INTERSTITIAL CYSTITIS (CHRONIC) WITHOUT HEMATURIA: ICD-10-CM

## 2024-12-27 DIAGNOSIS — R30.0 DYSURIA: Primary | ICD-10-CM

## 2024-12-27 PROCEDURE — 99213 OFFICE O/P EST LOW 20 MIN: CPT

## 2024-12-27 PROCEDURE — 3077F SYST BP >= 140 MM HG: CPT

## 2024-12-27 PROCEDURE — 1160F RVW MEDS BY RX/DR IN RCRD: CPT

## 2024-12-27 PROCEDURE — 1126F AMNT PAIN NOTED NONE PRSNT: CPT

## 2024-12-27 PROCEDURE — 1159F MED LIST DOCD IN RCRD: CPT

## 2024-12-27 PROCEDURE — 3078F DIAST BP <80 MM HG: CPT

## 2024-12-27 RX ORDER — PANTOPRAZOLE SODIUM 40 MG/1
TABLET, DELAYED RELEASE ORAL
COMMUNITY
Start: 2024-12-09

## 2024-12-27 NOTE — PROGRESS NOTES
"Chief Complaint  Urinary Tract Infection    Subjective        Timi Wolf presents to [facility]  History of Present Illness  79-year-old male presenting with dysuria and polyuria.  Patient Dr. Ruiz.  Has history of interstitial cystitis.  Was previously seen by Dr. Diana with urology and [facility]  Patient would prefer seeing different group for urology in the future.    Has been up most nights every 1-2 hours to urinate.  Quantity of urine is small.  Denies any hematuria, pain with urination, cloudy urine, fever.  Does experience some left lower quadrant discomfort prior to urination.      Objective   Vital Signs:  /71 (BP Location: Left arm, Patient Position: Sitting, Cuff Size: Adult)   Pulse (!) 44   Temp 98.1 °F (36.7 °C) (Temporal)   Ht 180.3 cm (71\")   Wt 94.9 kg (209 lb 3.2 oz)   SpO2 94%   BMI 29.18 kg/m²   Estimated body mass index is 29.18 kg/m² as calculated from the following:    Height as of this encounter: 180.3 cm (71\").    Weight as of this encounter: 94.9 kg (209 lb 3.2 oz).               Physical Exam  Vitals reviewed.   Constitutional:       Appearance: Normal appearance.   Musculoskeletal:         General: Normal range of motion.      Cervical back: Normal range of motion.   Skin:     General: Skin is warm and dry.      Capillary Refill: Capillary refill takes less than 2 seconds.   Neurological:      General: No focal deficit present.      Mental Status: He is alert and oriented to person, place, and time.   Psychiatric:         Mood and Affect: Mood normal.         Behavior: Behavior normal.         Thought Content: Thought content normal.         Judgment: Judgment normal.        Result Review :      Common labs          3/5/2024    16:21 6/22/2024    14:22 6/22/2024    17:44 6/23/2024    04:59   Common Labs   Glucose 93  128   87    BUN 22 21 23    Creatinine 1.55  1.55   1.49    Sodium 139  140   140    Potassium 4.2  3.9 "   3.9    Chloride 107  109   108    Calcium 9.4  9.6   9.8    Albumin 3.7  3.9   3.7    Total Bilirubin 0.4  0.8   0.8    Alkaline Phosphatase 70  67   62    AST (SGOT) 19  15   14    ALT (SGPT) 10  12   11    WBC 6.22  8.34   8.26    Hemoglobin 15.1  15.0   14.8    Hematocrit 43.4  44.1   42.7    Platelets 109  120   116    Total Cholesterol   141     Triglycerides   48     HDL Cholesterol   41     LDL Cholesterol    89     Hemoglobin A1C   5.40     PSA 1.310             Current Outpatient Medications on File Prior to Visit   Medication Sig Dispense Refill    amLODIPine-benazepril (LOTREL) 10-40 MG per capsule TAKE 1 CAPSULE BY MOUTH DAILY 90 capsule 3    apixaban (ELIQUIS) 2.5 MG tablet tablet Take 1 tablet by mouth 2 (Two) Times a Day. 180 tablet 3    Coenzyme Q10 150 MG capsule Take 1 tablet by mouth Every Night.      escitalopram (LEXAPRO) 10 MG tablet TAKE 1 TABLET BY MOUTH DAILY 90 tablet 3    famotidine (Pepcid) 40 MG tablet Take 1 tablet by mouth Daily. For heart burn 90 tablet 3    Melatonin 3 MG capsule Take 1 capsule by mouth Every Night.      metoprolol succinate XL (TOPROL-XL) 50 MG 24 hr tablet TAKE 1 TABLET BY MOUTH DAILY 90 tablet 3    pantoprazole (PROTONIX) 40 MG EC tablet       rosuvastatin (Crestor) 40 MG tablet Take 1 tablet by mouth Daily. 90 tablet 3    tamsulosin (FLOMAX) 0.4 MG capsule 24 hr capsule TAKE 1 CAPSULE BY MOUTH DAILY 90 capsule 3     No current facility-administered medications on file prior to visit.                Assessment and Plan     Diagnoses and all orders for this visit:    1. Dysuria (Primary)  -     Cancel: POC Urinalysis Dipstick, Automated  -     Cancel: Urine Culture - Urine, Urine, Clean Catch  -     amoxicillin-clavulanate (AUGMENTIN) 875-125 MG per tablet; Take 1 tablet by mouth 2 (Two) Times a Day for 7 days.  Dispense: 14 tablet; Refill: 0  -     Urine Culture - Urine, Urine, Clean Catch  -     Urinalysis With Microscopic If Indicated (No Culture) - Urine,  Clean Catch    2. Interstitial cystitis (chronic) without hematuria  -     Ambulatory Referral to Urology        Patient Instructions   Take Augmentin as prescribed. Lab today. Lab to determine if different antibiotic needed. Referral for urology placed. Scheduling center to call with appointment. Follow-up as needed.            Follow Up     Return if symptoms worsen or fail to improve.  Patient was given instructions and counseling regarding his condition or for health maintenance advice. Please see specific information pulled into the AVS if appropriate.

## 2024-12-27 NOTE — PATIENT INSTRUCTIONS
Take Augmentin as prescribed. Lab today. Lab to determine if different antibiotic needed. Referral for urology placed. Scheduling center to call with appointment. Follow-up as needed.

## 2024-12-28 LAB
APPEARANCE UR: CLEAR
BACTERIA #/AREA URNS HPF: NORMAL /[HPF]
BACTERIA UR CULT: NORMAL
BACTERIA UR CULT: NORMAL
BILIRUB UR QL STRIP: NEGATIVE
CASTS URNS QL MICRO: NORMAL /LPF
COLOR UR: YELLOW
EPI CELLS #/AREA URNS HPF: NORMAL /HPF (ref 0–10)
GLUCOSE UR QL STRIP: NEGATIVE
HGB UR QL STRIP: NEGATIVE
KETONES UR QL STRIP: ABNORMAL
LEUKOCYTE ESTERASE UR QL STRIP: NEGATIVE
MICRO URNS: ABNORMAL
NITRITE UR QL STRIP: NEGATIVE
PH UR STRIP: 5.5 [PH] (ref 5–7.5)
PROT UR QL STRIP: ABNORMAL
RBC #/AREA URNS HPF: NORMAL /HPF (ref 0–2)
SP GR UR STRIP: 1.02 (ref 1–1.03)
UROBILINOGEN UR STRIP-MCNC: 0.2 MG/DL (ref 0.2–1)
WBC #/AREA URNS HPF: NORMAL /HPF (ref 0–5)

## 2024-12-30 NOTE — PROGRESS NOTES
Urine culture did not grow enough bacteria to test for antibiotic sensitivity.  Urinalysis shows proteins and trace amount of ketones present.  Recommend continuing antibiotic as prescribed due to symptoms.  Recommend establishing with new urologist to further evaluate.

## 2024-12-31 ENCOUNTER — TELEPHONE (OUTPATIENT)
Dept: INTERNAL MEDICINE | Facility: CLINIC | Age: 79
End: 2024-12-31
Payer: MEDICARE

## 2024-12-31 NOTE — TELEPHONE ENCOUNTER
Called pt [phone] and spoke to pt and gave results, pt to complete abx in full. Pt also provided with name and number of urologist referral.

## 2024-12-31 NOTE — TELEPHONE ENCOUNTER
----- Message from Rudy Dailey sent at 12/30/2024  8:32 AM EST -----  Urine culture did not grow enough bacteria to test for antibiotic sensitivity.  Urinalysis shows proteins and trace amount of ketones present.  Recommend continuing antibiotic as prescribed due to symptoms.  Recommend establishing with new urologist to further evaluate.

## 2025-02-26 ENCOUNTER — OFFICE VISIT (OUTPATIENT)
Dept: INTERNAL MEDICINE | Facility: CLINIC | Age: 80
End: 2025-02-26
Payer: MEDICARE

## 2025-02-26 VITALS
DIASTOLIC BLOOD PRESSURE: 72 MMHG | OXYGEN SATURATION: 98 % | HEART RATE: 60 BPM | BODY MASS INDEX: 29.18 KG/M2 | SYSTOLIC BLOOD PRESSURE: 128 MMHG | WEIGHT: 208.4 LBS | HEIGHT: 71 IN

## 2025-02-26 DIAGNOSIS — H69.91 EUSTACHIAN TUBE DYSFUNCTION, RIGHT: Primary | ICD-10-CM

## 2025-02-26 PROBLEM — H91.90 HOH (HARD OF HEARING): Status: ACTIVE | Noted: 2025-02-26

## 2025-02-26 PROCEDURE — 1125F AMNT PAIN NOTED PAIN PRSNT: CPT | Performed by: NURSE PRACTITIONER

## 2025-02-26 PROCEDURE — 1159F MED LIST DOCD IN RCRD: CPT | Performed by: NURSE PRACTITIONER

## 2025-02-26 PROCEDURE — 1160F RVW MEDS BY RX/DR IN RCRD: CPT | Performed by: NURSE PRACTITIONER

## 2025-02-26 PROCEDURE — 3074F SYST BP LT 130 MM HG: CPT | Performed by: NURSE PRACTITIONER

## 2025-02-26 PROCEDURE — 99213 OFFICE O/P EST LOW 20 MIN: CPT | Performed by: NURSE PRACTITIONER

## 2025-02-26 PROCEDURE — 3078F DIAST BP <80 MM HG: CPT | Performed by: NURSE PRACTITIONER

## 2025-02-26 RX ORDER — FLUTICASONE PROPIONATE 50 MCG
1 SPRAY, SUSPENSION (ML) NASAL 2 TIMES DAILY
Qty: 16 G | Refills: 0 | Status: SHIPPED | OUTPATIENT
Start: 2025-02-26

## 2025-02-26 NOTE — PROGRESS NOTES
"        Chief Complaint  Hearing Problem (Right ear ) and Earache (Right ear )     Subjective:      History of Present Illness {CC  Problem List  Visit  Diagnosis   Encounters  Notes  Medications  Labs  Result Review Imaging  Media :23}     Timi Wolf is a patient of Stanley Ruiz MD and presents to [facility] for     Hearing issue:     BL hearing aids: [employer]     Last night: right decreased hearing  Can not clear right   No dizziness, ST, cough - no URI symptoms     Has appt next week with [facility]     I have reviewed patient's medical history, and new submitted information provided by patient or medical assistant and updated medical record.      Objective:      Physical Exam  HENT:      Right Ear: Tympanic membrane normal.      Left Ear: Tympanic membrane normal.      Mouth/Throat:      Pharynx: No posterior oropharyngeal erythema.   Eyes:      Conjunctiva/sclera: Conjunctivae normal.   Musculoskeletal:      Cervical back: Neck supple.   Lymphadenopathy:      Cervical: No cervical adenopathy.        Result Review  Data Reviewed:{ Labs  Result Review  Imaging  Med Tab  Media :23}                Vital Signs:   /72 (BP Location: Left arm, Patient Position: Sitting, Cuff Size: Adult)   Pulse 60   Ht 180.3 cm (71\")   Wt 94.5 kg (208 lb 6.4 oz)   SpO2 98%   BMI 29.07 kg/m²         Requested Prescriptions     Signed Prescriptions Disp Refills    fluticasone (FLONASE) 50 MCG/ACT nasal spray 16 g 0     Sig: Administer 1 spray into the nostril(s) as directed by provider 2 (Two) Times a Day.       Routine medications provided by this office will also be refilled via pharmacy request.       Current Outpatient Medications:     amLODIPine-benazepril (LOTREL) 10-40 MG per capsule, TAKE 1 CAPSULE BY MOUTH DAILY, Disp: 90 capsule, Rfl: 3    apixaban (ELIQUIS) 2.5 MG tablet tablet, Take 1 tablet by mouth 2 (Two) Times a Day., Disp: 180 tablet, Rfl: 3    Coenzyme Q10 150 MG " capsule, Take 1 tablet by mouth Every Night., Disp: , Rfl:     escitalopram (LEXAPRO) 10 MG tablet, TAKE 1 TABLET BY MOUTH DAILY, Disp: 90 tablet, Rfl: 3    famotidine (Pepcid) 40 MG tablet, Take 1 tablet by mouth Daily. For heart burn, Disp: 90 tablet, Rfl: 3    Melatonin 3 MG capsule, Take 1 capsule by mouth Every Night., Disp: , Rfl:     metoprolol succinate XL (TOPROL-XL) 50 MG 24 hr tablet, TAKE 1 TABLET BY MOUTH DAILY, Disp: 90 tablet, Rfl: 3    pantoprazole (PROTONIX) 40 MG EC tablet, , Disp: , Rfl:     rosuvastatin (Crestor) 40 MG tablet, Take 1 tablet by mouth Daily., Disp: 90 tablet, Rfl: 3    tamsulosin (FLOMAX) 0.4 MG capsule 24 hr capsule, TAKE 1 CAPSULE BY MOUTH DAILY, Disp: 90 capsule, Rfl: 3    fluticasone (FLONASE) 50 MCG/ACT nasal spray, Administer 1 spray into the nostril(s) as directed by provider 2 (Two) Times a Day., Disp: 16 g, Rfl: 0     Assessment and Plan:      Assessment and Plan {CC Problem List  Visit Diagnosis  ROS  Review (Popup)  Health Maintenance  Quality  BestPractice  Medications  SmartSets  SnapShot Encounters  Media :23}     Problem List Items Addressed This Visit    None  Visit Diagnoses       Eustachian tube dysfunction, right    -  Primary    Relevant Medications    fluticasone (FLONASE) 50 MCG/ACT nasal spray          Educated how to use spray.     Decreased hearing on right: no s/symptoms infection.   He will contact [facility] to see if can get in for evaluation - will have hearing aids checked at upcoming appt.       Follow Up {Instructions Charge Capture  Follow-up Communications :23}     Return if symptoms worsen or fail to improve.    Follow up with PCP as scheduled.     Patient was given instructions and counseling regarding his condition or for health maintenance advice. Please see specific information pulled into the AVS if appropriate.    Dragon disclaimer:   Much of this encounter note is an electronic transcription/translation of spoken language  to printed text. The electronic translation of spoken language may permit erroneous, or at times, nonsensical words or phrases to be inadvertently transcribed; Although I have reviewed the note for such errors, some may still exist.     Additional Patient Counseling:       There are no Patient Instructions on file for this visit.

## 2025-03-05 ENCOUNTER — LAB (OUTPATIENT)
Dept: LAB | Facility: HOSPITAL | Age: 80
End: 2025-03-05
Payer: MEDICARE

## 2025-03-05 ENCOUNTER — OFFICE VISIT (OUTPATIENT)
Dept: ONCOLOGY | Facility: CLINIC | Age: 80
End: 2025-03-05
Payer: MEDICARE

## 2025-03-05 VITALS
DIASTOLIC BLOOD PRESSURE: 88 MMHG | OXYGEN SATURATION: 97 % | WEIGHT: 209.2 LBS | SYSTOLIC BLOOD PRESSURE: 128 MMHG | HEIGHT: 71 IN | RESPIRATION RATE: 17 BRPM | BODY MASS INDEX: 29.29 KG/M2 | HEART RATE: 46 BPM | TEMPERATURE: 97.4 F

## 2025-03-05 DIAGNOSIS — N40.0 PROSTATE ENLARGEMENT: ICD-10-CM

## 2025-03-05 DIAGNOSIS — D69.6 THROMBOCYTOPENIA: ICD-10-CM

## 2025-03-05 DIAGNOSIS — D68.51 FACTOR 5 LEIDEN MUTATION, HETEROZYGOUS: Primary | ICD-10-CM

## 2025-03-05 DIAGNOSIS — Z79.01 CURRENT USE OF LONG TERM ANTICOAGULATION: ICD-10-CM

## 2025-03-05 DIAGNOSIS — I81 PORTAL VEIN THROMBOSIS: ICD-10-CM

## 2025-03-05 DIAGNOSIS — D68.51 FACTOR 5 LEIDEN MUTATION, HETEROZYGOUS: ICD-10-CM

## 2025-03-05 DIAGNOSIS — D68.59 HYPERCOAGULABLE STATE: ICD-10-CM

## 2025-03-05 LAB
ALBUMIN SERPL-MCNC: 3.9 G/DL (ref 3.5–5.2)
ALBUMIN/GLOB SERPL: 1.8 G/DL
ALP SERPL-CCNC: 70 U/L (ref 39–117)
ALT SERPL W P-5'-P-CCNC: 16 U/L (ref 1–41)
ANION GAP SERPL CALCULATED.3IONS-SCNC: 7.8 MMOL/L (ref 5–15)
AST SERPL-CCNC: 19 U/L (ref 1–40)
BASOPHILS # BLD AUTO: 0.04 10*3/MM3 (ref 0–0.2)
BASOPHILS NFR BLD AUTO: 0.6 % (ref 0–1.5)
BILIRUB SERPL-MCNC: 0.6 MG/DL (ref 0–1.2)
BUN SERPL-MCNC: 24 MG/DL (ref 8–23)
BUN/CREAT SERPL: 14 (ref 7–25)
CALCIUM SPEC-SCNC: 9.8 MG/DL (ref 8.6–10.5)
CHLORIDE SERPL-SCNC: 109 MMOL/L (ref 98–107)
CO2 SERPL-SCNC: 23.2 MMOL/L (ref 22–29)
CREAT SERPL-MCNC: 1.71 MG/DL (ref 0.76–1.27)
DEPRECATED RDW RBC AUTO: 40.2 FL (ref 37–54)
EGFRCR SERPLBLD CKD-EPI 2021: 40.7 ML/MIN/1.73
EOSINOPHIL # BLD AUTO: 0.34 10*3/MM3 (ref 0–0.4)
EOSINOPHIL NFR BLD AUTO: 5.2 % (ref 0.3–6.2)
ERYTHROCYTE [DISTWIDTH] IN BLOOD BY AUTOMATED COUNT: 12.3 % (ref 12.3–15.4)
GLOBULIN UR ELPH-MCNC: 2.2 GM/DL
GLUCOSE SERPL-MCNC: 93 MG/DL (ref 65–99)
HCT VFR BLD AUTO: 43.1 % (ref 37.5–51)
HGB BLD-MCNC: 14.9 G/DL (ref 13–17.7)
IMM GRANULOCYTES # BLD AUTO: 0.02 10*3/MM3 (ref 0–0.05)
IMM GRANULOCYTES NFR BLD AUTO: 0.3 % (ref 0–0.5)
LYMPHOCYTES # BLD AUTO: 1.51 10*3/MM3 (ref 0.7–3.1)
LYMPHOCYTES NFR BLD AUTO: 22.9 % (ref 19.6–45.3)
MCH RBC QN AUTO: 30.9 PG (ref 26.6–33)
MCHC RBC AUTO-ENTMCNC: 34.6 G/DL (ref 31.5–35.7)
MCV RBC AUTO: 89.4 FL (ref 79–97)
MONOCYTES # BLD AUTO: 0.84 10*3/MM3 (ref 0.1–0.9)
MONOCYTES NFR BLD AUTO: 12.7 % (ref 5–12)
NEUTROPHILS NFR BLD AUTO: 3.85 10*3/MM3 (ref 1.7–7)
NEUTROPHILS NFR BLD AUTO: 58.3 % (ref 42.7–76)
NRBC BLD AUTO-RTO: 0 /100 WBC (ref 0–0.2)
PLATELET # BLD AUTO: 100 10*3/MM3 (ref 140–450)
PMV BLD AUTO: 9.6 FL (ref 6–12)
POTASSIUM SERPL-SCNC: 4.4 MMOL/L (ref 3.5–5.2)
PROT SERPL-MCNC: 6.1 G/DL (ref 6–8.5)
RBC # BLD AUTO: 4.82 10*6/MM3 (ref 4.14–5.8)
SODIUM SERPL-SCNC: 140 MMOL/L (ref 136–145)
WBC NRBC COR # BLD AUTO: 6.6 10*3/MM3 (ref 3.4–10.8)

## 2025-03-05 PROCEDURE — 85025 COMPLETE CBC W/AUTO DIFF WBC: CPT

## 2025-03-05 PROCEDURE — 80053 COMPREHEN METABOLIC PANEL: CPT

## 2025-03-05 PROCEDURE — 36415 COLL VENOUS BLD VENIPUNCTURE: CPT

## 2025-03-05 NOTE — PROGRESS NOTES
Subjective     CHIEF COMPLAINT:      Chief Complaint   Patient presents with    Follow-up       HISTORY OF PRESENT ILLNESS:     Timi Wolf is a 79 y.o. male patient who returns today for annual follow-up.  He continues on chronic anticoagulation for his previous thrombosis related to factor V Leiden mutation.  He reports he tolerates his Eliquis very well.  He has no signs or symptoms of bleeding.  He has no lower extremity swelling, shortness of breath or abdominal pain.  He does continue to have thrombocytopenia related to his portal vein thrombosis though again denies signs or symptoms of bleeding.      ROS:  Pertinent ROS is in the HPI.     Past medical, surgical, social and family history were reviewed.     MEDICATIONS:    Current Outpatient Medications:     amLODIPine-benazepril (LOTREL) 10-40 MG per capsule, TAKE 1 CAPSULE BY MOUTH DAILY, Disp: 90 capsule, Rfl: 3    apixaban (ELIQUIS) 2.5 MG tablet tablet, Take 1 tablet by mouth 2 (Two) Times a Day., Disp: 180 tablet, Rfl: 3    Coenzyme Q10 150 MG capsule, Take 1 tablet by mouth Every Night., Disp: , Rfl:     escitalopram (LEXAPRO) 10 MG tablet, TAKE 1 TABLET BY MOUTH DAILY, Disp: 90 tablet, Rfl: 3    famotidine (Pepcid) 40 MG tablet, Take 1 tablet by mouth Daily. For heart burn, Disp: 90 tablet, Rfl: 3    fluticasone (FLONASE) 50 MCG/ACT nasal spray, Administer 1 spray into the nostril(s) as directed by provider 2 (Two) Times a Day., Disp: 16 g, Rfl: 0    Melatonin 3 MG capsule, Take 1 capsule by mouth Every Night., Disp: , Rfl:     metoprolol succinate XL (TOPROL-XL) 50 MG 24 hr tablet, TAKE 1 TABLET BY MOUTH DAILY, Disp: 90 tablet, Rfl: 3    pantoprazole (PROTONIX) 40 MG EC tablet, , Disp: , Rfl:     rosuvastatin (Crestor) 40 MG tablet, Take 1 tablet by mouth Daily., Disp: 90 tablet, Rfl: 3    tamsulosin (FLOMAX) 0.4 MG capsule 24 hr capsule, TAKE 1 CAPSULE BY MOUTH DAILY, Disp: 90 capsule, Rfl: 3  Objective     VITAL SIGNS:     Vitals:    03/05/25  "1256   BP: 128/88   Pulse: (!) 46   Resp: 17   Temp: 97.4 °F (36.3 °C)   TempSrc: Oral   SpO2: 97%   Weight: 94.9 kg (209 lb 3.2 oz)   Height: 180.3 cm (70.98\")   PainSc: 0-No pain     Body mass index is 29.19 kg/m².     Wt Readings from Last 5 Encounters:   03/05/25 94.9 kg (209 lb 3.2 oz)   02/26/25 94.5 kg (208 lb 6.4 oz)   12/27/24 94.9 kg (209 lb 3.2 oz)   08/21/24 92.1 kg (203 lb)   07/12/24 92 kg (202 lb 12.8 oz)     PHYSICAL EXAMINATION:   GENERAL: The patient appears in good general condition, not in acute distress.   SKIN: No ecchymosis.  EYES: No jaundice. No pallor.  LYMPHATICS: No cervical lymphadenopathy.  CHEST: Normal respiratory effort.   CVS: No edema.  ABDOMEN: Soft. No tenderness. No masses.  EXTREMITIES: No noted deformity.     DIAGNOSTIC DATA:     Results from last 7 days   Lab Units 03/05/25  1246   WBC 10*3/mm3 6.60   NEUTROS ABS 10*3/mm3 3.85   HEMOGLOBIN g/dL 14.9   HEMATOCRIT % 43.1   PLATELETS 10*3/mm3 100*     Results from last 7 days   Lab Units 03/05/25  1246   SODIUM mmol/L 140   POTASSIUM mmol/L 4.4   CHLORIDE mmol/L 109*   CO2 mmol/L 23.2   BUN mg/dL 24*   CREATININE mg/dL 1.71*   CALCIUM mg/dL 9.8   ALBUMIN g/dL 3.9   BILIRUBIN mg/dL 0.6   ALK PHOS U/L 70   ALT (SGPT) U/L 16   AST (SGOT) U/L 19   GLUCOSE mg/dL 93                  Assessment & Plan      * Portal vein thrombosis diagnosed March 2021:  Present to ER with abdominal pain, diagnosed pylephlebitis and he was placed on heparin and subsequently Eliquis and discharged home. Since discharge his abdominal pain has almost substantially improved to the point of resolution and he has no abdominal distention.   heterozygous for factor V Leiden mutation and he has positivity for anti-glycoprotein antibodies. The rest of the thrombophilia workup stated above is negative.   Follow-up CT of the abdomen 6/4/2021 The main portal vein and upper SMV are now extremely narrowed, and there is cavernous transformation of the portal vein with " numerous upper abdominal collaterals. The right and left portal vein branches are both patent. There is some persistent thrombus within a posterior right portal vein branch.  Recommendations from Dr. Magadleno for lifelong anticoagulation due to heterozygous factor V Leiden mutation  Continuing on prophylactic Eliquis 2.5 mg twice daily  Patient evaluated 3/5/2025 for annual follow-up.  He continues on anticoagulation without signs or symptoms of bleeding.  He has no signs or symptoms of recurrent thrombosis      *Thrombocytopenia  Without splenomegaly on imaging however, portal vein thrombosis raises the possibility of sequestration of platelets by the spleen  Since thrombosis, platelets have arranged from 100-115  Today, 3/5/2025 platelets 100,000 without signs or symptoms of bleeding    PLAN:  Continue prophylactic Eliquis 2.5 mg twice daily  We reviewed signs and symptoms of bleeding as well as signs and symptoms of thrombosis for which to monitor for  We reviewed borderline platelet count, the patient understands increased risk of bleeding and we will monitor his stool  Patient typically has labs performed in June through his primary care provider for monitoring of his CBC.  Will therefore schedule annual follow-up transitioning care to Dr. Santiago, previously established with Dr. Magdaleno with CBC    The patient continues on a high risk medication with lifelong anticoagulation    Rose Calderon, APRN  03/05/25

## 2025-03-06 ENCOUNTER — TELEPHONE (OUTPATIENT)
Dept: ONCOLOGY | Facility: CLINIC | Age: 80
End: 2025-03-06
Payer: MEDICARE

## 2025-03-06 NOTE — TELEPHONE ENCOUNTER
----- Message from Power Santiago sent at 3/6/2025  7:23 AM EST -----  Please inform the patient that labs showed some worsening of the kidney function and ask him to increase intake of fluids.    Thank you

## 2025-03-28 NOTE — TELEPHONE ENCOUNTER
Assessment/Plan:  Labs as well as x-ray discussed with the patient  Diagnoses and all orders for this visit:    Localized swelling of right foot  -     TEDS Stockings    Venous stasis  -     TEDS Stockings    Other orders  -     clotrimazole (LOTRIMIN) 1 % cream; Apply topically daily            Subjective:        Patient ID: Ema Alaniz  is a 61 y o  male  Patient is here to follow-up on right foot swelling and redness  Patient still with some swelling over the distal 2nd and 3rd metatarsal region  Patient also with swelling over the right 2nd toe  Patient did take steroids and antibiotics  Patient had x-ray labs done  The following portions of the patient's history were reviewed and updated as appropriate: allergies, current medications, past family history, past medical history, past social history, past surgical history and problem list       Review of Systems   Constitutional: Negative  HENT: Negative  Eyes: Negative  Respiratory: Negative  Cardiovascular: Negative  Gastrointestinal: Negative  Endocrine: Negative  Genitourinary: Negative  Musculoskeletal: Positive for arthralgias  Skin: Negative  Allergic/Immunologic: Negative  Neurological: Negative  Hematological: Negative  Psychiatric/Behavioral: Negative              Objective:               /88 (BP Location: Right arm, Patient Position: Sitting, Cuff Size: Adult)   Temp 97 6 °F (36 4 °C) (Tympanic)   Ht 6' 2" (1 88 m)   Wt 134 kg (295 lb)   BMI 37 88 kg/m²          Physical Exam Received paper request from patients mail in pharmacy. Refill sent to his pharmacy.

## 2025-04-29 RX ORDER — PANTOPRAZOLE SODIUM 40 MG/1
40 TABLET, DELAYED RELEASE ORAL DAILY
Qty: 90 TABLET | Refills: 3 | OUTPATIENT
Start: 2025-04-29

## 2025-05-19 ENCOUNTER — TELEPHONE (OUTPATIENT)
Dept: ONCOLOGY | Facility: CLINIC | Age: 80
End: 2025-05-19

## 2025-05-19 DIAGNOSIS — N30.90 CYSTITIS: Primary | ICD-10-CM

## 2025-05-19 NOTE — TELEPHONE ENCOUNTER
Caller: Timi Wolf    Relationship: Self    Best call back number: [phone]     What is the best time to reach you: ANYTIME    Who are you requesting to speak with (clinical staff, provider,  specific staff member): CLINICAL    What was the call regarding: PATIENT HAS CYSTITIS, PREVIOUSLY HAD A UROLOGIST BUT THEY RETIRED. HE WOULD LIKE DR PASTRANA TO SEND A REFERRAL TO [facility].    PLEASE ADVISE.

## 2025-05-19 NOTE — TELEPHONE ENCOUNTER
Caller: Timi Wolf    Relationship: Self    Best call back number: [phone]    Requested Prescriptions:   Requested Prescriptions     Pending Prescriptions Disp Refills    apixaban (ELIQUIS) 2.5 MG tablet tablet 180 tablet 3     Sig: Take 1 tablet by mouth 2 (Two) Times a Day.        Pharmacy where request should be sent: [facility] - [place] [address] [facility] [phone] [facility] [phone]      Last office visit with prescribing clinician: Visit date not found   Last telemedicine visit with prescribing clinician: Visit date not found   Next office visit with prescribing clinician: 3/17/2026     Additional details provided by patient:     Does the patient have less than a 3 day supply:  [x] Yes  [] No    Would you like a call back once the refill request has been completed: [] Yes [x] No    If the office needs to give you a call back, can they leave a voicemail: [] Yes [x] No

## 2025-05-23 ENCOUNTER — TELEPHONE (OUTPATIENT)
Dept: ONCOLOGY | Facility: CLINIC | Age: 80
End: 2025-05-23
Payer: MEDICARE

## 2025-05-23 NOTE — TELEPHONE ENCOUNTER
Caller: Timi Wolf    Relationship: Self    Best call back number: [phone]    What is the best time to reach you: ANYTIME    Who are you requesting to speak with (clinical staff, provider,  specific staff member): CLINICAL    What was the call regarding: PT [facility], PT HAS NOT RECEIVED HIS MEDICATION FROM [facility] YET AND WILL BE OUT ON SUNDAY.    PT REQUESTING A CALL BACK IF PRESCRIPTION IS CALLED IN     [facility] [id number] - [facility] [address] AT [facility] [address] [facility] [phone] [facility] [phone]

## 2025-05-23 NOTE — TELEPHONE ENCOUNTER
Returned call to patient, he states he will only need a 30 day supply sent locally. Prescription sent, he v/u.

## 2025-05-28 NOTE — TELEPHONE ENCOUNTER
Caller: Timi Wolf    Relationship: Self    Best call back number: [phone]     Requested Prescriptions:   Requested Prescriptions     Pending Prescriptions Disp Refills    apixaban (ELIQUIS) 2.5 MG tablet tablet 180 tablet 3     Sig: Take 1 tablet by mouth 2 (Two) Times a Day.        Pharmacy where request should be sent: [address] [phone] [facility] [phone]      Last office visit with prescribing clinician: Visit date not found   Last telemedicine visit with prescribing clinician: Visit date not found   Next office visit with prescribing clinician: 3/17/2026         Does the patient have less than a 3 day supply:  [] Yes  [x] No    Would you like a call back once the refill request has been completed: [x] Yes [] No    If the office needs to give you a call back, can they leave a voicemail: [x] Yes [] No    Victoriano Gruber Rep   05/28/25 14:39 EDT

## 2025-05-29 NOTE — TELEPHONE ENCOUNTER
Caller: Timi Wolf    Relationship: Self    Best call back number: [phone]      Who are you requesting to speak with (clinical staff, provider,  specific staff member): CLINICAL      What was the call regarding: PATIENT IS WANTING REFILL COMPLETED TODAY

## 2025-06-02 DIAGNOSIS — E78.2 MIXED HYPERLIPIDEMIA: Primary | ICD-10-CM

## 2025-06-02 DIAGNOSIS — I10 ESSENTIAL HYPERTENSION: ICD-10-CM

## 2025-06-02 RX ORDER — AMLODIPINE AND BENAZEPRIL HYDROCHLORIDE 10; 40 MG/1; MG/1
1 CAPSULE ORAL DAILY
Qty: 90 CAPSULE | Refills: 1 | Status: SHIPPED | OUTPATIENT
Start: 2025-06-02

## 2025-06-02 RX ORDER — ROSUVASTATIN CALCIUM 40 MG/1
40 TABLET, COATED ORAL DAILY
Qty: 90 TABLET | Refills: 1 | Status: SHIPPED | OUTPATIENT
Start: 2025-06-02

## 2025-06-02 RX ORDER — FAMOTIDINE 40 MG/1
40 TABLET, FILM COATED ORAL DAILY
Qty: 90 TABLET | Refills: 1 | Status: SHIPPED | OUTPATIENT
Start: 2025-06-02

## 2025-06-02 NOTE — TELEPHONE ENCOUNTER
Caller: ROYABRYN    Relationship: Other    Best call back number: [phone]    Requested Prescriptions:   Requested Prescriptions     Pending Prescriptions Disp Refills    apixaban (ELIQUIS) 2.5 MG tablet tablet 180 tablet 3     Sig: Take 1 tablet by mouth 2 (Two) Times a Day.        Pharmacy where request should be sent: [address] [phone] [facility] [phone]      Last office visit with prescribing clinician: Visit date not found   Last telemedicine visit with prescribing clinician: Visit date not found   Next office visit with prescribing clinician: 3/17/2026     Additional details provided by patient:     Does the patient have less than a 3 day supply:  [x] Yes  [] No    Would you like a call back once the refill request has been completed: [] Yes [x] No    If the office needs to give you a call back, can they leave a voicemail: [] Yes [x] No

## 2025-06-02 NOTE — TELEPHONE ENCOUNTER
Rx Refill Note  Requested Prescriptions     Pending Prescriptions Disp Refills    famotidine (PEPCID) 40 MG tablet [Pharmacy Med Name: Famotidine 40 MG Oral Tablet] 90 tablet 1     Sig: TAKE 1 TABLET BY MOUTH DAILY FOR HEART BURN     Signed Prescriptions Disp Refills    amLODIPine-benazepril (LOTREL) 10-40 MG per capsule 90 capsule 1     Sig: TAKE 1 CAPSULE BY MOUTH DAILY     Authorizing Provider: CONOR DOCKERY     Ordering User: VANESSA HALEY    rosuvastatin (CRESTOR) 40 MG tablet 90 tablet 1     Sig: TAKE 1 TABLET BY MOUTH DAILY     Authorizing Provider: CONOR DOCKERY     Ordering User: VANESSA HALEY      Last office visit with prescribing clinician: 8/21/2024   Last telemedicine visit with prescribing clinician: Visit date not found   Next office visit with prescribing clinician: 8/27/2025       Vanessa Haley MA  06/02/25, 12:50 EDT

## 2025-06-03 ENCOUNTER — TELEPHONE (OUTPATIENT)
Dept: ONCOLOGY | Facility: CLINIC | Age: 80
End: 2025-06-03
Payer: MEDICARE

## 2025-06-03 NOTE — TELEPHONE ENCOUNTER
Caller: Timi Wolf    Relationship to patient: Self    Best call back number:   Telephone Information:   Mobile [phone]     Chief complaint: DISCOMFORT UNDER BREAST BONE, LAST SEEN 3/2025    Type of visit: F/U     Requested date: CALL TO [facility] APPT WOULD TO SEE DR. PASTRANA    If rescheduling, when is the original appointment: N/A

## 2025-06-03 NOTE — TELEPHONE ENCOUNTER
Call placed to [facility] to inquire why the patient has not received his Eliquis when it was originally sent on 5/13. They stated the refill on 5/13 made a different account because his name on the new account does not contain 'Jr.', like it does on the original account. A request to merge his accounts have been sent in on their part and they will reach out to our office to confirm the merge. A new prescription was verbally placed on his main account and expedited shipping has been requested so that the pt can receive his medication by the alex of the week.     Attempted to contact the patient about this, but was directed to voicemail and the vm box is full.

## 2025-06-03 NOTE — TELEPHONE ENCOUNTER
PATIENT CALLING BACK TODAY 6/3/2025 AT 11:09 AM, IS ALMOST OUT OF MEDS.  SPOKE WITH [facility] AND THEN NEED A NEW SCRIPT SENT TO THEM.  CALL PATIENT BACK ONCE YOU SEND SCRIPT TO [facility]     Caller: DOMINGUEZ FARLEY      Relationship: Other     Best call back number: [phone]    Requested Prescriptions:   Requested Prescriptions             Pending Prescriptions Disp Refills    apixaban (ELIQUIS) 2.5 MG tablet tablet 180 tablet 3       Sig: Take 1 tablet by mouth 2 (Two) Times a Day.         Pharmacy where request should be sent: [facility] - [address] [phone] [facility] [phone]       Last office visit with prescribing clinician: Visit date not found   Last telemedicine visit with prescribing clinician: Visit date not found   Next office visit with prescribing clinician: 3/17/2026      Additional details provided by patient:      Does the patient have less than a 3 day supply:  [x] Yes  [] No     If the office needs to give you a call back, can they leave a voicemail: [] Yes [x] No

## 2025-06-05 ENCOUNTER — TELEPHONE (OUTPATIENT)
Dept: ONCOLOGY | Facility: CLINIC | Age: 80
End: 2025-06-05

## 2025-06-05 DIAGNOSIS — D68.51 FACTOR 5 LEIDEN MUTATION, HETEROZYGOUS: Primary | ICD-10-CM

## 2025-06-05 DIAGNOSIS — I81 PORTAL VEIN THROMBOSIS: ICD-10-CM

## 2025-06-05 NOTE — TELEPHONE ENCOUNTER
Caller: Timi Wolf    Relationship to patient: Self    Best call back number: [phone]    Chief complaint: PT CANCELLED HIS APPT. FOR TOMORROW & DID NOT WANT TO R/S AS OF NOW.    Type of visit: LAB & F/U 2    Requested date: WILL CALL BACK WHEN READY TO R/S     If rescheduling, when is the original appointment: 6/6/25

## 2025-08-27 ENCOUNTER — OFFICE VISIT (OUTPATIENT)
Dept: INTERNAL MEDICINE | Facility: CLINIC | Age: 80
End: 2025-08-27
Payer: MEDICARE

## 2025-08-27 VITALS
DIASTOLIC BLOOD PRESSURE: 72 MMHG | OXYGEN SATURATION: 97 % | HEART RATE: 46 BPM | SYSTOLIC BLOOD PRESSURE: 128 MMHG | WEIGHT: 204 LBS | BODY MASS INDEX: 28.47 KG/M2

## 2025-08-27 DIAGNOSIS — D68.51 FACTOR 5 LEIDEN MUTATION, HETEROZYGOUS: ICD-10-CM

## 2025-08-27 DIAGNOSIS — E55.9 VITAMIN D DEFICIENCY: ICD-10-CM

## 2025-08-27 DIAGNOSIS — Z12.5 PROSTATE CANCER SCREENING: ICD-10-CM

## 2025-08-27 DIAGNOSIS — E53.8 B12 DEFICIENCY: ICD-10-CM

## 2025-08-27 DIAGNOSIS — R73.01 IMPAIRED FASTING GLUCOSE: ICD-10-CM

## 2025-08-27 DIAGNOSIS — N40.0 PROSTATE ENLARGEMENT: ICD-10-CM

## 2025-08-27 DIAGNOSIS — Z00.00 MEDICARE ANNUAL WELLNESS VISIT, SUBSEQUENT: Primary | ICD-10-CM

## 2025-08-27 DIAGNOSIS — I81 PORTAL VEIN THROMBOSIS: ICD-10-CM

## 2025-08-27 DIAGNOSIS — E78.2 MIXED HYPERLIPIDEMIA: ICD-10-CM

## 2025-08-27 DIAGNOSIS — I10 ESSENTIAL HYPERTENSION: ICD-10-CM

## 2025-08-28 LAB
25(OH)D3+25(OH)D2 SERPL-MCNC: 21 NG/ML (ref 30–100)
ALBUMIN SERPL-MCNC: 4.1 G/DL (ref 3.5–5.2)
ALBUMIN/GLOB SERPL: 2 G/DL
ALP SERPL-CCNC: 69 U/L (ref 39–117)
ALT SERPL-CCNC: 15 U/L (ref 1–41)
APPEARANCE UR: CLEAR
AST SERPL-CCNC: 20 U/L (ref 1–40)
BACTERIA #/AREA URNS HPF: NORMAL /HPF
BASOPHILS # BLD AUTO: 0.06 10*3/MM3 (ref 0–0.2)
BASOPHILS NFR BLD AUTO: 0.9 % (ref 0–1.5)
BILIRUB SERPL-MCNC: 0.6 MG/DL (ref 0–1.2)
BILIRUB UR QL STRIP: NEGATIVE
BUN SERPL-MCNC: 26 MG/DL (ref 8–23)
BUN/CREAT SERPL: 15 (ref 7–25)
CALCIUM SERPL-MCNC: 9.9 MG/DL (ref 8.6–10.5)
CASTS URNS MICRO: NORMAL
CHLORIDE SERPL-SCNC: 108 MMOL/L (ref 98–107)
CHOLEST SERPL-MCNC: 99 MG/DL (ref 0–200)
CHOLEST/HDLC SERPL: 2.68 {RATIO}
CO2 SERPL-SCNC: 20.8 MMOL/L (ref 22–29)
COLOR UR: YELLOW
CREAT SERPL-MCNC: 1.73 MG/DL (ref 0.76–1.27)
EGFRCR SERPLBLD CKD-EPI 2021: 39.9 ML/MIN/1.73
EOSINOPHIL # BLD AUTO: 0.73 10*3/MM3 (ref 0–0.4)
EOSINOPHIL NFR BLD AUTO: 10.6 % (ref 0.3–6.2)
EPI CELLS #/AREA URNS HPF: NORMAL /HPF
ERYTHROCYTE [DISTWIDTH] IN BLOOD BY AUTOMATED COUNT: 12.3 % (ref 12.3–15.4)
GLOBULIN SER CALC-MCNC: 2.1 GM/DL
GLUCOSE SERPL-MCNC: 95 MG/DL (ref 65–99)
GLUCOSE UR QL STRIP: NEGATIVE
HBA1C MFR BLD: 5.4 % (ref 4.8–5.6)
HCT VFR BLD AUTO: 42.2 % (ref 37.5–51)
HDLC SERPL-MCNC: 37 MG/DL (ref 40–60)
HGB BLD-MCNC: 14.2 G/DL (ref 13–17.7)
HGB UR QL STRIP: NEGATIVE
IMM GRANULOCYTES # BLD AUTO: 0.03 10*3/MM3 (ref 0–0.05)
IMM GRANULOCYTES NFR BLD AUTO: 0.4 % (ref 0–0.5)
KETONES UR QL STRIP: ABNORMAL
LDLC SERPL CALC-MCNC: 42 MG/DL (ref 0–100)
LEUKOCYTE ESTERASE UR QL STRIP: NEGATIVE
LYMPHOCYTES # BLD AUTO: 1.64 10*3/MM3 (ref 0.7–3.1)
LYMPHOCYTES NFR BLD AUTO: 23.8 % (ref 19.6–45.3)
MCH RBC QN AUTO: 31.5 PG (ref 26.6–33)
MCHC RBC AUTO-ENTMCNC: 33.6 G/DL (ref 31.5–35.7)
MCV RBC AUTO: 93.6 FL (ref 79–97)
MONOCYTES # BLD AUTO: 0.88 10*3/MM3 (ref 0.1–0.9)
MONOCYTES NFR BLD AUTO: 12.8 % (ref 5–12)
NEUTROPHILS # BLD AUTO: 3.55 10*3/MM3 (ref 1.7–7)
NEUTROPHILS NFR BLD AUTO: 51.5 % (ref 42.7–76)
NITRITE UR QL STRIP: NEGATIVE
NRBC BLD AUTO-RTO: 0 /100 WBC (ref 0–0.2)
PH UR STRIP: 5.5 [PH] (ref 5–8)
PLATELET # BLD AUTO: 108 10*3/MM3 (ref 140–450)
POTASSIUM SERPL-SCNC: 5 MMOL/L (ref 3.5–5.2)
PROT SERPL-MCNC: 6.2 G/DL (ref 6–8.5)
PROT UR QL STRIP: ABNORMAL
PSA SERPL-MCNC: 1.11 NG/ML (ref 0–4)
RBC # BLD AUTO: 4.51 10*6/MM3 (ref 4.14–5.8)
RBC #/AREA URNS HPF: NORMAL /HPF
SODIUM SERPL-SCNC: 141 MMOL/L (ref 136–145)
SP GR UR STRIP: 1.02 (ref 1–1.03)
T3FREE SERPL-MCNC: 2.9 PG/ML (ref 2–4.4)
T4 FREE SERPL-MCNC: 1.1 NG/DL (ref 0.92–1.68)
TRIGL SERPL-MCNC: 108 MG/DL (ref 0–150)
TSH SERPL DL<=0.005 MIU/L-ACNC: 1.36 UIU/ML (ref 0.27–4.2)
UROBILINOGEN UR STRIP-MCNC: ABNORMAL MG/DL
VIT B12 SERPL-MCNC: 487 PG/ML (ref 211–946)
VLDLC SERPL CALC-MCNC: 20 MG/DL (ref 5–40)
WBC # BLD AUTO: 6.89 10*3/MM3 (ref 3.4–10.8)
WBC #/AREA URNS HPF: NORMAL /HPF

## (undated) DEVICE — LN SMPL CO2 SHTRM SD STREAM W/M LUER

## (undated) DEVICE — ADAPT CLN BIOGUARD AIR/H2O DISP

## (undated) DEVICE — BITEBLOCK OMNI BLOC

## (undated) DEVICE — FRCP BX RADJAW4 NDL 2.8 240CM LG OG BX40

## (undated) DEVICE — SENSR O2 OXIMAX FNGR A/ 18IN NONSTR

## (undated) DEVICE — MSK PROC CURAPLEX O2 2/ADAPT 7FT

## (undated) DEVICE — KT ORCA ORCAPOD DISP STRL

## (undated) DEVICE — TUBING, SUCTION, 1/4" X 10', STRAIGHT: Brand: MEDLINE